# Patient Record
Sex: FEMALE | Race: WHITE | NOT HISPANIC OR LATINO | Employment: FULL TIME | URBAN - METROPOLITAN AREA
[De-identification: names, ages, dates, MRNs, and addresses within clinical notes are randomized per-mention and may not be internally consistent; named-entity substitution may affect disease eponyms.]

---

## 2017-01-05 ENCOUNTER — ALLSCRIPTS OFFICE VISIT (OUTPATIENT)
Dept: OTHER | Facility: OTHER | Age: 27
End: 2017-01-05

## 2017-03-02 ENCOUNTER — ALLSCRIPTS OFFICE VISIT (OUTPATIENT)
Dept: OTHER | Facility: OTHER | Age: 27
End: 2017-03-02

## 2017-05-24 ENCOUNTER — ALLSCRIPTS OFFICE VISIT (OUTPATIENT)
Dept: OTHER | Facility: OTHER | Age: 27
End: 2017-05-24

## 2017-05-24 LAB — S PYO AG THROAT QL: NEGATIVE

## 2017-09-26 ENCOUNTER — ALLSCRIPTS OFFICE VISIT (OUTPATIENT)
Dept: OTHER | Facility: OTHER | Age: 27
End: 2017-09-26

## 2017-09-26 LAB — S PYO AG THROAT QL: NEGATIVE

## 2017-10-23 DIAGNOSIS — E66.01 MORBID (SEVERE) OBESITY DUE TO EXCESS CALORIES (HCC): ICD-10-CM

## 2017-10-23 DIAGNOSIS — H66.90 OTITIS MEDIA: ICD-10-CM

## 2017-10-23 DIAGNOSIS — J30.9 ALLERGIC RHINITIS: ICD-10-CM

## 2017-10-23 DIAGNOSIS — Z13.220 ENCOUNTER FOR SCREENING FOR LIPOID DISORDERS: ICD-10-CM

## 2017-10-23 DIAGNOSIS — H69.80 OTHER SPECIFIED DISORDERS OF EUSTACHIAN TUBE, UNSPECIFIED EAR: ICD-10-CM

## 2017-10-23 DIAGNOSIS — R63.5 ABNORMAL WEIGHT GAIN: ICD-10-CM

## 2017-10-23 DIAGNOSIS — Z00.00 ENCOUNTER FOR GENERAL ADULT MEDICAL EXAMINATION WITHOUT ABNORMAL FINDINGS: ICD-10-CM

## 2017-10-23 DIAGNOSIS — J02.9 ACUTE PHARYNGITIS: ICD-10-CM

## 2017-10-24 ENCOUNTER — LAB CONVERSION - ENCOUNTER (OUTPATIENT)
Dept: OTHER | Facility: OTHER | Age: 27
End: 2017-10-24

## 2017-10-24 LAB
A/G RATIO (HISTORICAL): 1.7 (CALC) (ref 1–2.5)
ALBUMIN SERPL BCP-MCNC: 4.6 G/DL (ref 3.6–5.1)
ALP SERPL-CCNC: 80 U/L (ref 33–115)
ALT SERPL W P-5'-P-CCNC: 10 U/L (ref 6–29)
AST SERPL W P-5'-P-CCNC: 14 U/L (ref 10–30)
BILIRUB SERPL-MCNC: 0.4 MG/DL (ref 0.2–1.2)
BILIRUB UR QL STRIP: NEGATIVE
BUN SERPL-MCNC: 17 MG/DL (ref 7–25)
BUN/CREA RATIO (HISTORICAL): NORMAL (CALC) (ref 6–22)
CALCIUM SERPL-MCNC: 9.7 MG/DL (ref 8.6–10.2)
CHLORIDE SERPL-SCNC: 106 MMOL/L (ref 98–110)
CHOLEST SERPL-MCNC: 176 MG/DL
CHOLEST/HDLC SERPL: 2.9 (CALC)
CO2 SERPL-SCNC: 26 MMOL/L (ref 20–31)
COLOR UR: YELLOW
COMMENT (HISTORICAL): CLEAR
CORTIS AM PEAK SERPL-SCNC: 21.4 MCG/DL
CREAT SERPL-MCNC: 0.77 MG/DL (ref 0.5–1.1)
DEPRECATED RDW RBC AUTO: 12.9 % (ref 11–15)
EGFR AFRICAN AMERICAN (HISTORICAL): 122 ML/MIN/1.73M2
EGFR-AMERICAN CALC (HISTORICAL): 105 ML/MIN/1.73M2
FECAL OCCULT BLOOD DIAGNOSTIC (HISTORICAL): NEGATIVE
GAMMA GLOBULIN (HISTORICAL): 2.7 G/DL (CALC) (ref 1.9–3.7)
GLUCOSE (HISTORICAL): 93 MG/DL (ref 65–99)
GLUCOSE (HISTORICAL): NEGATIVE
HCT VFR BLD AUTO: 41.8 % (ref 35–45)
HDLC SERPL-MCNC: 60 MG/DL
HGB BLD-MCNC: 13.3 G/DL (ref 11.7–15.5)
KETONES UR STRIP-MCNC: NEGATIVE MG/DL
LDL CHOLESTEROL (HISTORICAL): 99 MG/DL (CALC)
LEUKOCYTE ESTERASE UR QL STRIP: NEGATIVE
MCH RBC QN AUTO: 27.4 PG (ref 27–33)
MCHC RBC AUTO-ENTMCNC: 31.8 G/DL (ref 32–36)
MCV RBC AUTO: 86.2 FL (ref 80–100)
NITRITE UR QL STRIP: NEGATIVE
NON-HDL-CHOL (CHOL-HDL) (HISTORICAL): 116 MG/DL (CALC)
PH UR STRIP.AUTO: 6 [PH] (ref 5–8)
PLATELET # BLD AUTO: 296 THOUSAND/UL (ref 140–400)
PMV BLD AUTO: 10.9 FL (ref 7.5–12.5)
POTASSIUM SERPL-SCNC: 5.2 MMOL/L (ref 3.5–5.3)
PROT UR STRIP-MCNC: NEGATIVE MG/DL
RBC # BLD AUTO: 4.85 MILLION/UL (ref 3.8–5.1)
SODIUM SERPL-SCNC: 140 MMOL/L (ref 135–146)
SP GR UR STRIP.AUTO: 1.02 (ref 1–1.03)
TOTAL PROTEIN (HISTORICAL): 7.3 G/DL (ref 6.1–8.1)
TRIGL SERPL-MCNC: 77 MG/DL
TSH SERPL DL<=0.05 MIU/L-ACNC: 4.31 MIU/L
WBC # BLD AUTO: 6.9 THOUSAND/UL (ref 3.8–10.8)

## 2017-10-27 NOTE — PROGRESS NOTES
Assessment  1  Otitis media (382 9) (H66 90)   2  ETD (eustachian tube dysfunction) (381 81) (H69 80)   3  Allergic rhinitis (477 9) (J30 9)   4  Severe obesity (BMI 35 0-39 9) (278 01) (E66 01)   5  Sore throat (462) (J02 9)   6  Encounter for preventive health examination (V70 0) (Z00 00)   7  Weight gain (783 1) (R63 5)   8  Screening for lipid disorders (V77 91) (Z13 220)    Plan  Allergic rhinitis, ETD (eustachian tube dysfunction)    · Azelastine HCl - 0 1 % Nasal Solution; USE 2 SPRAYS IN EACH       NOSTRIL  TWICE DAILY  Allergic rhinitis, Health Maintenance, ETD (eustachian tube dysfunction), Otitis media,  Screening for lipid disorders, Severe obesity (BMI 35 0-39 9), Sore throat    · Begin or continue regular aerobic exercise  Gradually work up to at least {count1}  sessions of {dur1} of exercise a week ; Status:Complete;   Done: 33GJZ9397 10:08AM   · Continue with our present treatment plan ; Status:Complete;   Done: 28COJ0152  10:08AM   · Drink plenty of fluids ; Status:Complete;   Done: 75LDD5240 10:08AM   · Gargle with warm salt water for 5 minutes every 4 hours ; Status:Complete;   Done:  86KLV6629 10:08AM   · We recommend that you bring your body mass index down to 26 ; Status:Complete;    Done: 25DFI1651 10:08AM  Allergic rhinitis, Health Maintenance, ETD (eustachian tube dysfunction), Otitis media,  Screening for lipid disorders, Severe obesity (BMI 35 0-39 9), Sore throat, Weight  gain    · Follow-up visit in 1 month Evaluation and Treatment  Follow-up  Status: Hold For -  Scheduling  Requested for: 26Sep2017   · (1) CBC/ PLT (NO DIFF); Status:Active; Requested ICW:60SZI7315;    · (1) COMPREHENSIVE METABOLIC PANEL; Status:Active; Requested KTR:56XUD5416;    · (1) CORTISOL AM SPECIMEN; Status:Active; Requested MPV:22GIY1820;    · (1) LIPID PANEL, FASTING; Status:Active; Requested for:23Oct2017;    · (1) TSH WITH FT4 REFLEX; Status:Active;  Requested NJ:07FYE1021;    · (1) URINALYSIS (will reflex a microscopy if leukocytes, occult blood, protein or nitrites  are not within normal limits); Status:Active; Requested UJY:10AXY2735;   Otitis media    · DrRx Zithromax Z-Davi 250 MG #6 pill pack; Sig   2 stat then one daily    Discussion/Summary    1  Sore throat, rapid strep was negative, patient to gargle with salt water use over-the-counter lozengesOtitis media, both serous infectious, Z-Davi was prescribedAllergic rhinitis 4  Etd, Astelin nasal spray prescribedSevere obesity 6  Waking, blood work is orderedHealth maintenance will check lipid panel with abovePatient return in 1 month, if symptomatology is not better next week return to office  Possible side effects of new medications were reviewed with the patient/guardian today  The treatment plan was reviewed with the patient/guardian  The patient/guardian understands and agrees with the treatment plan     Self Referrals: No      Chief Complaint  sore throat  chills and fever no cough rapid strep done today      History of Present Illness  HPI: The past 2 days patient has had fever and chills at home  Patient with head D just mild otalgia sneezing PRA post a drip and sore throat  No medications have been taken  Patient tells me she is trying to lose weight for the past year but ended up gaining 4 lb since last office visit  Review of Systems    Constitutional: as noted in HPI    ENT: as noted in HPI  Cardiovascular: no complaints of slow or fast heart rate, no chest pain, no palpitations, no leg claudication or lower extremity edema  Respiratory: no complaints of shortness of breath, no wheezing, no dyspnea on exertion, no orthopnea or PND  Breasts: no complaints of breast pain, breast lump or nipple discharge  Gastrointestinal: no complaints of abdominal pain, no constipation, no nausea or diarrhea, no vomiting, no bloody stools     Genitourinary: no complaints of dysuria, no incontinence, no pelvic pain, no dysmenorrhea, no vaginal discharge or abnormal vaginal bleeding  Musculoskeletal: no complaints of arthralgia, no myalgia, no joint swelling or stiffness, no limb pain or swelling  Integumentary: no complaints of skin rash or lesion, no itching or dry skin, no skin wounds  Neurological: no complaints of headache, no confusion, no numbness or tingling, no dizziness or fainting  Active Problems  1  Allergic rhinitis (477 9) (J30 9)   2  Encounter for insertion of mirena IUD (V25 11) (Z30 430)   3  Encounter for routine gynecological examination (V72 31) (Z01 419)   4  Migraine (346 90) (G43 909)   5  Presence of intrauterine contraceptive device (IUD) (V45 51) (Z97 5)   6  Sore throat (462) (J02 9)    Past Medical History  1  History of Abdominal pain, RUQ (789 01) (R10 11)   2  History of Acute pelvic pain, female (625 9) (R10 2)   3  History of Blood pressure elevated (401 9) (I10)   4  History of Cervical cancer screening (V76 2) (Z12 4)   5  History of Contraceptives (V25 02)   6  History of Dysfunction of Eustachian tube, unspecified laterality (381 81) (H69 80)   7  History of Encounter for IUD insertion (V25 11) (Z30 430)   8  History of  1 (V22 0) (Z34 00)   9  History of Gynecologic Services Intrauterine Device (IUD) Reinsertion (V25 13)   10  History of acute sinusitis (V12 69) (Z87 09)   11  History of pregnancy (V13 29)   12  Denied: History of self breast exam   13  History of urinary tract infection (V13 02) (Z87 440)   14  History of varicella (V12 09) (Z86 19)   15  History of Mother currently breast-feeding   12  History of Other chronic pain (338 29) (G89 29)   17  History of Symptomatic cholelithiasis (574 20) (K80 20)  Active Problems And Past Medical History Reviewed: The active problems and past medical history were reviewed and updated today  Family History  Mother    1  Family history of Breast Cancer (V16 3)  Father    2  Family history of Heart attack   3   Family history of Heart disease  Paternal Grandfather    4  Family history of Heart attack   5  Family history of Heart disease  Maternal Aunt    6  Family history of Breast Cancer (V16 3)   7  Family history of Breast Cancer (V16 3)  Family History Reviewed: The family history was reviewed and updated today  Social History   · Being A Social Drinker   · Denied: Caffeine Use   · Denied: History of Drug Use   ·    · Never A Smoker   · No secondhand smoke exposure (V49 89) (Z78 9)   · One child   · Sikhism Affiliation None   · Uses Safety Equipment - Seatbelts  The social history was reviewed and updated today  Surgical History  1  History of  Section Low Transverse   2  History of Cholecystectomy Laparoscopic   3  History of Colonoscopy   4  History of Ear Pressure Equalization Tube   5  History of Gallbladder Surgery   6  History of Gynecologic Services Intrauterine Device (IUD) Insertion   7  History of Oral Surgery Tooth Extraction [place] Tooth  Surgical History Reviewed: The surgical history was reviewed and updated today  Current Meds   1  Prenatal Vitamins CAPS Recorded    The medication list was reviewed and updated today  Allergies  1  Ceftin TABS   2  Maxalt TABS  3  No Known Environmental Allergies   4  No Known Food Allergies    Vitals   Recorded: 60WOO5224 09:49AM   Temperature 99 8 F   Heart Rate 64   Respiration 16   Systolic 957   Diastolic 72   Height 5 ft 5 5 in   Weight 215 lb 8 oz   BMI Calculated 35 32   BSA Calculated 2 05     Physical Exam    Constitutional   General appearance: No acute distress, well appearing and well nourished  Eyes   Conjunctiva and lids: No swelling, erythema or discharge  Ears, Nose, Mouth, and Throat   External inspection of ears and nose: Normal     Otoscopic examination: Abnormal  -- Both tympanic membranes dull with fluid left TM is injected     Nasal mucosa, septum, and turbinates: Abnormal  -- Positive allergic turbinates negative sinus tenderness to percussion  Oropharynx: Abnormal  -- Off white postnasal drip mild injection negative exudate  Pulmonary   Respiratory effort: No increased work of breathing or signs of respiratory distress  Auscultation of lungs: Clear to auscultation  Cardiovascular   Palpation of heart: Normal PMI, no thrills  Auscultation of heart: Normal rate and rhythm, normal S1 and S2, without murmurs  Carotid pulses: Normal     Lymphatic   Palpation of lymph nodes in neck: Abnormal  -- Shotty anterior nodes  Musculoskeletal   Gait and station: Normal     Skin Warm and dry  Neurologic Negative gross focal motor deficit     Psychiatric   Mood and affect: Normal          Signatures   Electronically signed by : Johnathon Dominique DO; Sep 26 2017 10:10AM EST                       (Author)

## 2017-11-07 ENCOUNTER — ALLSCRIPTS OFFICE VISIT (OUTPATIENT)
Dept: OTHER | Facility: OTHER | Age: 27
End: 2017-11-07

## 2017-11-08 NOTE — PROGRESS NOTES
Assessment  1  Allergic rhinitis (477 9) (J30 9)   2  Migraine headache (346 90) (G43 909)   · MRI Brain wnl 8/12   3  ETD (eustachian tube dysfunction) (381 81) (H69 80)   4  Severe obesity (BMI 35 0-39 9) (278 01) (E66 01)   5  Weight gain (783 1) (R63 5)   6  Screening for lipid disorders (V77 91) (Z13 220)   7  Encounter for preventive health examination (V70 0) (Z00 00)    Plan  Allergic rhinitis, Health Maintenance, ETD (eustachian tube dysfunction), Migraine  headache, Screening for lipid disorders, Severe obesity (BMI 35 0-39 9), Weight gain    · Begin or continue regular aerobic exercise  Gradually work up to at least {count1}  sessions of {dur1} of exercise a week ; Status:Complete;   Done: 17KPQ9418 08:19AM   · Continue with our present treatment plan ; Status:Complete;   Done: 06JPK1814  08:19AM   · We recommend that you bring your body mass index down to 26 ; Status:Complete;    Done: 09OBC1804 08:19AM   · Follow-up visit in 1 year Evaluation and Treatment  Follow-up  Status: Hold For -  Scheduling  Requested for: 88LNJ6392   · (1) CBC/ PLT (NO DIFF); Status:Active; Requested for:07Nov2018;    · (1) COMPREHENSIVE METABOLIC PANEL; Status:Active; Requested for:07Nov2018;    · (1) LIPID PANEL, FASTING; Status:Active; Requested for:07Nov2018;    · (1) TSH WITH FT4 REFLEX; Status:Active; Requested for:07Nov2018;    · (1) URINALYSIS (will reflex a microscopy if leukocytes, occult blood, protein or nitrites are  not within normal limits); Status:Active; Requested for:07Nov2018;   PMH: History of otitis media    · DrRx Zithromax Z-Davi 250 MG #6 pill pack    Discussion/Summary    1  AR/ETD, stable continue Astelin nasal sprayMigraine headache, asymptomatic at the present timeObesity, severe, blood work is normalWaking, patient did lose 5 lb since last office visit, diet exercise and weight loss discussedHealth maintenance, repeat routine blood work 1 yearReturn to office in 1 year, sooner if needed     The treatment plan was reviewed with the patient/guardian  The patient/guardian understands and agrees with the treatment plan     Self Referrals: No      Chief Complaint  1 month follow up, obesity  review lab results      History of Present Illness  Patient doing well ENT symptomatology has completely resolved  Blood work from last month was discussed with the patient  Patient lose 5 lb since last office visit      Review of Systems    Constitutional: as noted in HPI  Eyes: as noted in HPI    ENT: no complaints of earache, no loss of hearing, no nose bleeds, no nasal discharge, no sore throat, no hoarseness  Cardiovascular: No complaints of slow heart rate, no fast heart rate, no chest pain, no palpitations, no leg claudication, no lower extremity edema  Respiratory: No complaints of shortness of breath, no wheezing, no cough, no SOB on exertion, no orthopnea, no PND  Gastrointestinal: No complaints of abdominal pain, no constipation, no nausea or vomiting, no diarrhea, no bloody stools  Genitourinary: No complaints of dysuria, no incontinence, no pelvic pain, no dysmenorrhea, no vaginal discharge or bleeding  Musculoskeletal: No complaints of arthralgias, no myalgias, no joint swelling or stiffness, no limb pain or swelling  Integumentary: No complaints of skin rash or lesions, no itching, no skin wounds, no breast pain or lump  Neurological: No complaints of headache, no confusion, no convulsions, no numbness, no dizziness or fainting, no tingling, no limb weakness, no difficulty walking  Psychiatric: Not suicidal, no sleep disturbance, no anxiety or depression, no change in personality, no emotional problems  Endocrine: No complaints of proptosis, no hot flashes, no muscle weakness, no deepening of the voice, no feelings of weakness  Hematologic/Lymphatic: No complaints of swollen glands, no swollen glands in the neck, does not bleed easily, does not bruise easily  Active Problems  1  Allergic rhinitis (477 9) (J30 9)   2  Encounter for insertion of mirena IUD (V25 11) (Z30 430)   3  Encounter for routine gynecological examination (V72 31) (Z01 419)   4  ETD (eustachian tube dysfunction) (381 81) (H69 80)   5  Migraine headache (346 90) (G43 909)   6  Presence of intrauterine contraceptive device (IUD) (V45 51) (Z97 5)   7  Screening for lipid disorders (V77 91) (Z13 220)   8  Severe obesity (BMI 35 0-39 9) (278 01) (E66 01)   9  Weight gain (783 1) (R63 5)    Past Medical History  1  History of Abdominal pain, RUQ (789 01) (R10 11)   2  History of Acute pelvic pain, female (625 9) (R10 2)   3  History of Blood pressure elevated (401 9) (I10)   4  History of Cervical cancer screening (V76 2) (Z12 4)   5  History of Contraceptives (V25 02)   6  History of Dysfunction of Eustachian tube, unspecified laterality (381 81) (H69 80)   7  History of Encounter for IUD insertion (V25 11) (Z30 430)   8  History of  1 (V22 0) (Z34 00)   9  History of Gynecologic Services Intrauterine Device (IUD) Reinsertion (V25 13)   10  History of acute sinusitis (V12 69) (Z87 09)   11  History of pregnancy (V13 29)   12  Denied: History of self breast exam   13  History of urinary tract infection (V13 02) (Z87 440)   14  History of varicella (V12 09) (Z86 19)   15  History of Mother currently breast-feeding   12  History of Other chronic pain (338 29) (G89 29)   17  History of Symptomatic cholelithiasis (574 20) (K80 20)    Surgical History  1  History of  Section Low Transverse   2  History of Cholecystectomy Laparoscopic   3  History of Colonoscopy   4  History of Ear Pressure Equalization Tube   5  History of Gallbladder Surgery   6  History of Gynecologic Services Intrauterine Device (IUD) Insertion   7  History of Oral Surgery Tooth Extraction [place] Tooth    The surgical history was reviewed and updated today  Family History  Mother    1  Family history of Breast Cancer (V16 3)  Father    2  Family history of Heart attack   3  Family history of Heart disease  Paternal Grandfather    4  Family history of Heart attack   5  Family history of Heart disease  Maternal Aunt    6  Family history of Breast Cancer (V16 3)   7  Family history of Breast Cancer (V16 3)    The family history was reviewed and updated today  Social History   · Being A Social Drinker   · Denied: Caffeine Use   · Denied: History of Drug Use   ·    · Never A Smoker   · No secondhand smoke exposure (V49 89) (Z78 9)   · One child   · Religion Affiliation None   · Uses Safety Equipment - Seatbelts  The social history was reviewed and updated today  Current Meds   1  Azelastine HCl - 0 1 % Nasal Solution; USE 2 SPRAYS IN EACH       NOSTRIL TWICE   DAILY; Therapy: 85BRL8589 to (Last Nuria Osei)  Requested for: 39SKZ7166 Ordered   2  DrRx Zithromax Z-Davi 250 MG #6 pill pack; Sig   2 stat then one daily; Therapy: 81SPS3893 to (Evaluate:01Oct2017); Last Rx:56Bjv9305 Ordered   3  Prenatal Vitamins CAPS Recorded    The medication list was reviewed and updated today  Allergies  1  Ceftin TABS   2  Maxalt TABS  3  No Known Environmental Allergies   4  No Known Food Allergies    Vitals  Vital Signs    Recorded: 44YFO9377 08:05AM   Heart Rate 70   Respiration 16   Systolic 513   Diastolic 80   Height 5 ft 5 5 in   Weight 211 lb 6 oz   BMI Calculated 34 64   BSA Calculated 2 04     Physical Exam    Constitutional   General appearance: No acute distress, well appearing and well nourished  Eyes   Conjunctiva and lids: No swelling, erythema or discharge  Ears, Nose, Mouth, and Throat   External inspection of ears and nose: Normal     Otoscopic examination: Tympanic membranes translucent with normal light reflex  Canals patent without erythema  Nasal mucosa, septum, and turbinates: Abnormal  -- Mildly allergic turbinates  Oropharynx: Normal with no erythema, edema, exudate or lesions      Pulmonary   Respiratory effort: No increased work of breathing or signs of respiratory distress  Auscultation of lungs: Clear to auscultation  Cardiovascular   Palpation of heart: Normal PMI, no thrills  Auscultation of heart: Normal rate and rhythm, normal S1 and S2, without murmurs  Examination of extremities for edema and/or varicosities: Normal     Carotid pulses: Normal     Abdomen Soft nontender  Lymphatic   Palpation of lymph nodes in neck: No lymphadenopathy  Musculoskeletal   Gait and station: Normal     Skin Warm and dry  Neurologic Negative gross focal motor deficit  Psychiatric   Mood and affect: Normal          Results/Data  Tobacco Screening 79WGQ6133 08:09AM User, Ahs     Test Name Result Flag Reference   Tobacco Screening - Result Negative       PHQ-2 Adult Depression Screening 74VLG8980 08:08AM User, Ahs     Test Name Result Flag Reference   PHQ-2 Adult Depression Score 0     Over the last two weeks, how often have you been bothered by any of the following problems?   Little interest or pleasure in doing things: Not at all - 0  Feeling down, depressed, or hopeless: Not at all - 0   PHQ-2 Adult Depression Screening Negative         Signatures   Electronically signed by : Moses Mcclain DO; Nov 7 2017  8:20AM EST                       (Author)

## 2018-01-09 NOTE — MISCELLANEOUS
Message  Message Free Text Note Form: Patient called, notes continued left breast tenderness and swelling  She started antibiotics 3 days previously and is continuing to attempt to pump the breast  She notes minimal discharge from the breast at this time  Of note, her fever is improved and her muscle aches/sweats have resolved  Likely, the antibiotic is helping but she was encouraged to continue with it  Should she not notice significant improvement by tomorrow, 9/12 she was encouraged to call in for further evaluation  I spoke with Nara Moore, nurse at [facility] on 9/12 and we will contact the patient today  Should she continue with significant symptoms, would recommend left breast ultrasound today with follow-up after that for evaluation to rule out breast abscess        Signatures   Electronically signed by : NEDA Baird ; Sep 12 2016  9:43AM EST                       (Author)

## 2018-01-10 NOTE — MISCELLANEOUS
Message  Return to work or school:   Jenny Fiore is under my professional care  She was seen in my office on 09/26/2017   She is able to return to work on  09/27/2017      Please excuse absence, if you have any questions please feel free to call the office  Yumiko Ford,         Signatures   Electronically signed by : Ryley Browne, ; Sep 26 2017 10:17AM EST                       (Author)

## 2018-01-11 NOTE — PROGRESS NOTES
Active Problems    1  Currently pregnant (V22 2) (Z33 1)    Current Meds    1  Multi-Vitamin Daily Oral Tablet; Therapy: (Recorded:47Kkc0995) to Recorded    Allergies    1  Ceftin TABS   2  Maxalt TABS    3  No Known Environmental Allergies   4  No Known Food Allergies    Results/Data  [id number] Transvaginal Ultrasound OB [facility]:   Procedure: 78960-[id number] pregnant uterus real time with image documentation, fetal and maternal evaluation, first trimester (<14 weeks 0 days), transvaginal approach: single or first gestation  The study was done today in the office  Indication: EDC gestational age 11w0d with an [facility] of 9/1/2016 weeks  Exam indication: New OB  Findings:   Number of gestational sacs and fetuses: One gestational sac containing one embryo  Gestational sac/fetal measurements appropriate for gestation: CRL = 18 6mm  yolk sac = 3 8mm  FHR = 168bpm    Survey of visible fetal and placental anatomic structure within normal limits  Qualitative assessment of amniotic fluid volume/gestational sac shape: There is a a 1 7cm subchorionic bleed present adjacent to the gestational sac  Exam of maternal uterus and adnexa: within normal limits  There is a 2 1cm corpus luteal cyst    Impression: Single, viable IUP slightly small for dates, however consistent with patient's longer cycles  8w3d with an EDC of 9/5/2016 by today's ultrasound        Signatures   Electronically signed by : Raheel Trimble, ; Jan 28 2016 10:50AM EST                       (Author)    Electronically signed by : NEDA Florian ; Jan 28 2016  4:24PM EST

## 2018-01-11 NOTE — MISCELLANEOUS
Message   Date: 12 Sep 2016 9:43 AM EST, Recorded By: Uli Dave For: Mukul Mott   Reason: Medical Complaint   Called patient per Dr Luz Alatorre to check  Pt c/o still left breast pain, redness, hardness  Taking Keflex as directed  Advised needs left breast ultrasound scheduled for 11:30 today @ [address]  Also advised schedule appt for breast exam with Dr Luz Alatorre  Advised increase fluids, tylenol PRN  Active Problems    1  Acute mastitis of left breast (611 0) (N61)   2  Birth control counseling (V25 09) (Z30 9)   3  Currently pregnant (V22 2) (Z33 1)   4  Migraine (346 90) (G43 909)   5  Need for diphtheria-tetanus-pertussis (Tdap) vaccine (V06 1) (Z23)   6  Overweight (278 02) (E66 3)    Current Meds   1  Acetaminophen-Codeine #3 300-30 MG Oral Tablet; Take one tablet 3 times daily as   needed for headache; Therapy: 40DJP6939 to (Last Rx:35Epu3657) Ordered   2  Cephalexin 500 MG Oral Tablet (Cephalexin Monohydrate); TAKE 1 TABLET Every 6   hours; Therapy: 38OAI0024 to (Evaluate:71Vdo3967)  Requested for: 35Xne2214; Last   Rx:30Hvg3176 Ordered   3  Prenatal Vitamins CAPS Recorded    Allergies    1  Ceftin TABS   2  Maxalt TABS    3  No Known Environmental Allergies   4  No Known Food Allergies    Plan  Acute mastitis of left breast    · *US BREAST LEFT LIMITED (DIAGNOSTIC); Status:Hold For - [employer];  Requested MMR:86GUT5798;     Signatures   Electronically signed by : Cathie Joseph, ; Sep 12 2016  9:46AM EST                       (Author)

## 2018-01-11 NOTE — MISCELLANEOUS
Message   Recorded as Task   Date: 09/19/2016 02:08 PM, Created By: Eugene Moreno   Task Name: Miscellaneous   Assigned To: Mar Preston   Regarding Patient: Adrián Collins, Status: In Progress   Comment:    Ivan Bell - 19 Sep 2016 2:08 PM     TASK CREATED  The culture from her breast showed staph aureus, sensitive to Keflex and Augmentin with which she was treated  Please inform the patient that the antibiotics should work for this bacteria  Please call her to see how she is doing  Additionally, this was not truly a wound culture but a culture of her nipple discharge  This should be amended through the lab  Thanks   Elis Liao - 19 Sep 2016 3:58 PM     TASK IN PROGRESS        Active Problems    1  Acute mastitis of left breast (611 0) (N61)   2  Birth control counseling (V25 09) (Z30 9)   3  Migraine (346 90) (G43 909)   4  Need for diphtheria-tetanus-pertussis (Tdap) vaccine (V06 1) (Z23)   5  Overweight (278 02) (E66 3)    Current Meds   1  Acetaminophen-Codeine #3 300-30 MG Oral Tablet; Take one tablet 3 times daily as   needed for headache; Therapy: 10YQB8872 to (Last Rx:32Nab3014) Ordered   2  Cephalexin 500 MG Oral Tablet (Cephalexin Monohydrate); TAKE 1 TABLET Every 6   hours; Therapy: 84MCT5143 to (Evaluate:22Xvd6983)  Requested for: 54Wet4275; Last   Rx:06Wos5584 Ordered   3  Clindamycin HCl - 300 MG Oral Capsule; TAKE 1 CAPSULE 3 times daily; Therapy: 37Cjs5592 to (Evaluate:39Mdh8970)  Requested for: 36Idk8405; Last   Rx:40Uvz7196 Ordered   4  Prenatal Vitamins CAPS Recorded    Allergies    1  Ceftin TABS   2  Maxalt TABS    3  No Known Environmental Allergies   4   No Known Food Allergies    Signatures   Electronically signed by : Terri Bolden, ; Sep 19 2016  4:37PM EST                       (Author)

## 2018-01-11 NOTE — RESULT NOTES
Verified Results  (1) WOUND CULTURE [id number] 02:08PM Tom Bennett     Test Name Result Flag Reference   CLINICAL REPORT (Report)     Test:        Wound culture  Specimen Type:    Wound  Specimen Date:   9/13/2016 2:08 PM  Result Date:    9/16/2016 10:32 AM  Result Status:   Final result  Resulting Lab:   [facility] [address] [address]            Tel: [phone]      CULTURE                                       ------------------                                   2+ Growth of Staphylococcus aureus    2+ Growth of Mixed Skin [place]      STAIN                                        ------------------                                   No polys seen    1+ Gram positive cocci in pairs      SUSCEPTIBILITY                                   ------------------                                                       Staphylococcus aureus  METHOD                 REMY  -------------------------------------  ----------------------------  AMOXICILLIN + CLAVULANATE        <=4/2 ug/ml   Susceptible  AMPICILLIN ($$)             >8 00 ug/ml   Resistant  AMPICILLIN + SULBACTAM ($)       <=8/4 ug/ml   Susceptible  CEFAZOLIN ($)              <=8 00 ug/ml   Susceptible  CLINDAMYCIN ($)             <=0 50 ug/ml   Susceptible  ERYTHROMYCIN ($$$$)           <=0 50 ug/ml   Susceptible  GENTAMICIN ($$)             <=4 ug/ml    Susceptible  OXACILLIN                <=0 25 ug/ml   Susceptible  TETRACYCLINE              <=4 ug/ml    Susceptible  TRIMETHOPRIM + SULFAMETHOXAZOLE ($$$)  <=0 5/9 5 ug/ml Susceptible  VANCOMYCIN ($)             2 00 ug/ml    Susceptible

## 2018-01-12 VITALS
DIASTOLIC BLOOD PRESSURE: 72 MMHG | TEMPERATURE: 98.3 F | HEART RATE: 88 BPM | WEIGHT: 204 LBS | SYSTOLIC BLOOD PRESSURE: 110 MMHG | HEIGHT: 66 IN | BODY MASS INDEX: 32.78 KG/M2

## 2018-01-12 VITALS
WEIGHT: 211.38 LBS | HEART RATE: 70 BPM | HEIGHT: 66 IN | RESPIRATION RATE: 16 BRPM | SYSTOLIC BLOOD PRESSURE: 102 MMHG | DIASTOLIC BLOOD PRESSURE: 80 MMHG | BODY MASS INDEX: 33.97 KG/M2

## 2018-01-12 NOTE — MISCELLANEOUS
Message   Recorded as Task   Date: 11/08/2016 11:33 AM, Created By: Amena Hillman   Task Name: Call Back   Assigned To: [facility],Team   Regarding Patient: Roscoe Olivo, Status: Active   CommentChencho Ozuna - 08 Nov 2016 11:33 AM     TASK CREATED  Please call with results  Chronic inflammation  No cancer  Gabby June - 08 Nov 2016 12:09 PM     TASK EDITED  Left message for patient to call office for results  Gabby June - 14 Nov 2016 4:45 PM     TASK EDITED  Pt in office 11/14/16 and results were given  See office note        Active Problems    1  Acute mastitis of left breast (611 0) (N61 0)   2  Birth control counseling (V25 09) (Z30 9)   3  Contraception (V25 9) (Z30 9)   4  Elevated liver function tests (790 6) (R79 89)   5  Encounter for insertion of mirena IUD (V25 11) (Z30 430)   6  Encounter for IUD insertion (V25 11) (Z30 430)   7  Flank pain (789 09) (R10 9)   8  Hematuria, microscopic (599 72) (R31 29)   9  Migraine (346 90) (G43 909)   10  Mother currently breast-feeding   6  Need for diphtheria-tetanus-pertussis (Tdap) vaccine (V06 1) (Z23)   12  Overweight (278 02) (E66 3)    Current Meds   1  Clindamycin HCl - 300 MG Oral Capsule; TAKE 1 CAPSULE 3 times daily; Therapy: 03Dto4189 to (Ada Aj)  Requested for: 22FGF5230; Last   Rx:14Nov2016 Ordered   2  Colace 100 MG Oral Capsule; TAKE 1 CAPSULE TWICE DAILY; Therapy: 55ULV7230 to (Evaluate:05Nov2016); Last Rx:21Oct2016 Ordered   3  Keflex 500 MG Oral Capsule (Cephalexin); Therapy: (Benjamin Rich) to Recorded   4  Ondansetron 4 MG Oral Tablet Dispersible; take 1 tablet every 6 hours as needed for   nausea; Therapy: 21Oct2016 to (Last Rx:21Oct2016) Ordered   5  Prenatal Vitamins CAPS Recorded    Allergies    1  Ceftin TABS   2  Maxalt TABS    3  No Known Environmental Allergies   4   No Known Food Allergies    Signatures   Electronically signed by : Zackary Castillo, ; Nov 14 2016  4:45PM EST (Author)

## 2018-01-12 NOTE — PROGRESS NOTES
Assessment    1  Migraine (346 90) (G43 909)   · MRI Brain wnl 8/12   2  Currently pregnant (V22 2) (Z33 1)   3  Blood pressure elevated (796 2) (I10)   4  Allergic rhinitis (477 9) (J30 9)    Plan  Allergic rhinitis, Blood pressure elevated, Currently pregnant, Migraine    · Continue with our present treatment plan ; Status:Complete;   Done: 80QLG4312  03:47PM   · Follow-up PRN Evaluation and Treatment  Follow-up  Status: Complete  Done:  31HNP5539 03:47PM  Currently pregnant, Migraine    · *1 - [facility] Physician Referral  Consult  Status: Hold For - Scheduling   Requested for: 01GAV2615  Care Summary provided  : Yes  Migraine    · Acetaminophen-Codeine #3 300-30 MG Oral Tablet; Take one tablet 3 times daily  as needed for headache  Unlinked    · Multi-Vitamin Daily Oral Tablet    Discussion/Summary    #1  Migraine cephalgia, discussed medications in pregnancy at length the different categories of drugs  Will prescribe Tylenol No  3 with codeine  Drowsiness and GI side effects discussed  Discussed is a category C  drug risk and benefit I discussed at length of this medication with the patient    In light of patient being pregnant and migraine headaches  Will refer to neurology for further evaluation and treatment if needed  #2  Pregnancy patient is following up with OB/GYN  #3  Elevated blood pressure, improved during exam  Patient will have OB/GYN monitor this especially during pregnancy  #4  Allergic rhinitis, asymptomatic at the present time  Possible side effects of new medications were reviewed with the patient/guardian today  The treatment plan was reviewed with the patient/guardian  The patient/guardian understands and agrees with the treatment plan      Chief Complaint  pt is 9 weeks pregnant and states she's had migranes since elementary school  Pt usually takes Excedrin Migrane  Pt's migranes consist of loss of vision in her right eye, aura, vomiting x 1 month   Tylenol is not working   ak History of Present Illness  HPI: History as above  The headaches have not changed in any way he'll went from his patient used to use Excedrin Migraine with excellent relief unfortunately been pregnant she's not allow to use that patient states she gets 2-3 headaches a week      Review of Systems    Constitutional: No fever, no chills, feels well, no tiredness, no recent weight gain or loss  ENT: no ear ache, no loss of hearing, no nosebleeds or nasal discharge, no sore throat or hoarseness  Cardiovascular: no complaints of slow or fast heart rate, no chest pain, no palpitations, no leg claudication or lower extremity edema  Respiratory: no complaints of shortness of breath, no wheezing, no dyspnea on exertion, no orthopnea or PND  Breasts: no complaints of breast pain, breast lump or nipple discharge  Gastrointestinal: no complaints of abdominal pain, no constipation, no nausea or diarrhea, no vomiting, no bloody stools  Genitourinary: as noted in HPI  Musculoskeletal: no complaints of arthralgia, no myalgia, no joint swelling or stiffness, no limb pain or swelling  Integumentary: no complaints of skin rash or lesion, no itching or dry skin, no skin wounds  Neurological: as noted in HPI  Active Problems    1  Allergic rhinitis (477 9) (J30 9)   2  Currently pregnant (V22 2) (Z33 1)   3  Migraine (346 90) (G43 909)   4  Overweight (278 02) (E66 3)    Past Medical History    1  History of Acute pelvic pain, female (625 9) (N94 9)   2  History of Cervical cancer screening (V76 2) (Z12 4)   3  History of Contraceptives (V25 02)   4  History of Encounter for routine gynecological examination (V72 31) (Z01 419)   5  History of  0 (V61 8) (Z63 8)   6  History of Gynecologic Services Intrauterine Device (IUD) Reinsertion (V25 13)   7  History of varicella (V12 09) (Z86 19)   8  History of Irregular menses (626 4) (N92 6)   9  History of Other chronic pain (338 29) (G89 29)   10   History of Summary Of Previous Pregnancies  0  (Total No )    Family History    1  Family history of Breast Cancer (V16 3)    2  Family history of Heart attack   3  Family history of Heart disease    4  Family history of Heart attack   5  Family history of Heart disease    6  Family history of Breast Cancer (V16 3)   7  Family history of Breast Cancer (V16 3)  Family History Reviewed: The family history was reviewed and updated today  Social History    · Being A Social Drinker   · Denied: Caffeine Use   · Condom   · Denied: History of Drug Use   · Denied: History of    ·    · Never A Smoker   · Church Affiliation None   · Uses Safety Equipment - Seatbelts  The social history was reviewed and updated today  Surgical History    1  History of Ear Pressure Equalization Tube   2  History of Gynecologic Services Intrauterine Device (IUD) Insertion    Current Meds   1  Multi-Vitamin Daily Oral Tablet; Therapy: (Recorded:61Rjo7223) to Recorded   2  Prenatal Vitamins CAPS Recorded    Allergies    1  Ceftin TABS   2  Maxalt TABS    3  No Known Environmental Allergies   4  No Known Food Allergies    Vitals   Recorded: 46MVZ1111 03:40PM Recorded: 82XPP5581 03:24PM   Heart Rate  80   Systolic 878 397   Diastolic 70 70   Height  5 ft 6 in   Weight  200 lb    BMI Calculated  32 28   BSA Calculated  2 01     Physical Exam    Constitutional   General appearance: No acute distress, well appearing and well nourished  Eyes   Conjunctiva and lids: No swelling, erythema or discharge  Pupils and irises: Equal, round and reactive to light  Ears, Nose, Mouth, and Throat   External inspection of ears and nose: Normal     Otoscopic examination: Tympanic membranes translucent with normal light reflex  Canals patent without erythema  Nasal mucosa, septum, and turbinates: Abnormal   mildly allergic turbinates  Oropharynx: Normal with no erythema, edema, exudate or lesions      Pulmonary   Respiratory effort: No increased work of breathing or signs of respiratory distress  Auscultation of lungs: Clear to auscultation  Cardiovascular   Palpation of heart: Normal PMI, no thrills  Auscultation of heart: Normal rate and rhythm, normal S1 and S2, without murmurs  Examination of extremities for edema and/or varicosities: Normal     Carotid pulses: Normal     Lymphatic   Palpation of lymph nodes in neck: No lymphadenopathy  Musculoskeletal   Gait and station: Normal     Skin Warm and dry  Neurologic   Cranial nerves: Cranial nerves 2-12 intact  Psychiatric   Mood and affect: Normal          Future Appointments    Date/Time Provider Specialty Site   2016 05:45 PM NEDA Roldan   Obstetrics/Gynecology [facility]   2016 02:30 PM  [place], Schedule  [facility] OUTPATIENT     Signatures   Electronically signed by : Roberto Carlos Ferguson DO; 2016  3:49PM EST                       (Author)

## 2018-01-12 NOTE — MISCELLANEOUS
Message   Recorded as Task   Date: 11/14/2016 09:28 AM, Created By: Tiffany Mosher   Task Name: Care Coordination   Assigned To: Elis Liao   Regarding Patient: Shawn Martin, Status: Active   Comment:    Elis Liao - 14 Nov 2016 9:28 AM     TASK CREATED  pt has Mastitis in her other breast now    You saw her in October for this  Rosario Lerma has an appt next week for her IUD check    Can we give her the antibiotic? Solitario Thrasher - 14 Nov 2016 10:29 AM     TASK REPLIED TO: Previously Assigned To Solitario Thrasher  yes, repeat what was given to her previously        Active Problems    1  Acute mastitis of left breast (611 0) (N61 0)   2  Birth control counseling (V25 09) (Z30 9)   3  Contraception (V25 9) (Z30 9)   4  Elevated liver function tests (790 6) (R79 89)   5  Encounter for insertion of mirena IUD (V25 11) (Z30 430)   6  Encounter for IUD insertion (V25 11) (Z30 430)   7  Flank pain (789 09) (R10 9)   8  Hematuria, microscopic (599 72) (R31 29)   9  Migraine (346 90) (G43 909)   10  Mother currently breast-feeding   6  Need for diphtheria-tetanus-pertussis (Tdap) vaccine (V06 1) (Z23)   12  Overweight (278 02) (E66 3)    Current Meds   1  Colace 100 MG Oral Capsule; TAKE 1 CAPSULE TWICE DAILY; Therapy: 82ZIP8762 to (Evaluate:05Nov2016); Last Rx:21Oct2016 Ordered   2  Keflex 500 MG Oral Capsule (Cephalexin); Therapy: (Galdino Garcia) to Recorded   3  Ondansetron 4 MG Oral Tablet Dispersible; take 1 tablet every 6 hours as needed for   nausea; Therapy: 96RDZ1406 to (Last Rx:21Oct2016) Ordered   4  Prenatal Vitamins CAPS Recorded    Allergies    1  Ceftin TABS   2  Maxalt TABS    3  No Known Environmental Allergies   4   No Known Food Allergies    Plan  Acute mastitis of left breast    · Clindamycin HCl - 300 MG Oral Capsule; TAKE 1 CAPSULE 3 times daily    Signatures   Electronically signed by : Giovanna Perkins, ; Nov 14 2016 10:42AM EST                       (Author)

## 2018-01-13 VITALS
DIASTOLIC BLOOD PRESSURE: 80 MMHG | WEIGHT: 205 LBS | SYSTOLIC BLOOD PRESSURE: 114 MMHG | HEIGHT: 66 IN | BODY MASS INDEX: 32.95 KG/M2

## 2018-01-13 VITALS
WEIGHT: 215.5 LBS | RESPIRATION RATE: 16 BRPM | SYSTOLIC BLOOD PRESSURE: 120 MMHG | BODY MASS INDEX: 34.63 KG/M2 | HEIGHT: 66 IN | HEART RATE: 64 BPM | DIASTOLIC BLOOD PRESSURE: 72 MMHG | TEMPERATURE: 99.8 F

## 2018-01-13 VITALS
TEMPERATURE: 99 F | DIASTOLIC BLOOD PRESSURE: 72 MMHG | BODY MASS INDEX: 33.93 KG/M2 | WEIGHT: 211.13 LBS | HEIGHT: 66 IN | SYSTOLIC BLOOD PRESSURE: 114 MMHG

## 2018-01-13 NOTE — MISCELLANEOUS
Message   Recorded as Task   Date: 09/08/2016 10:35 AM, Created By: Walter Damon   Task Name: Miscellaneous   Assigned To: Claudine Nuñez   Regarding Patient: Adan Womack, Status: In Progress   CommentNoel Messing - 08 Sep 2016 10:35 AM     TASK CREATED  Vision was seen today for 2 week incision check and mastitis evaluation  She is interested in Mirena IUD  Please check for insurance company coverage  She will follow-up in 4 weeks' time for the 6 week postpartum check  If covered, we can place it shortly after that visit  [facility] [place] - 08 Sep 2016 4:02 PM     TASK EDITED                 Spoke to Deisy Ma at [facility], Mirena iud is covered at 100% under plan  NO ded or oop   [facility] [place] - 08 Sep 2016 4:24 PM     TASK EDITED                 lm for pt   [facility] - 08 Sep 2016 4:24 PM     TASK IN PROGRESS   [facility] [place] - 12 Sep 2016 9:45 AM     TASK EDITED                 Spoke to patient, she wishes to proceed with iud insertion  SHe will schedule with Dr Razia Matias after her pp check up        Active Problems    1  Acute mastitis of left breast (611 0) (N61)   2  Birth control counseling (V25 09) (Z30 9)   3  Currently pregnant (V22 2) (Z33 1)   4  Migraine (346 90) (G43 909)   5  Need for diphtheria-tetanus-pertussis (Tdap) vaccine (V06 1) (Z23)   6  Overweight (278 02) (E66 3)    Current Meds   1  Acetaminophen-Codeine #3 300-30 MG Oral Tablet; Take one tablet 3 times daily as   needed for headache; Therapy: 72BLH2862 to (Last Rx:78Ozw0574) Ordered   2  Cephalexin 500 MG Oral Tablet (Cephalexin Monohydrate); TAKE 1 TABLET Every 6   hours; Therapy: 50ZZH6985 to (Evaluate:15Ngq8124)  Requested for: 73Hqm6601; Last   Rx:35Wpx1215 Ordered   3  Prenatal Vitamins CAPS Recorded    Allergies    1  Ceftin TABS   2  Maxalt TABS    3  No Known Environmental Allergies   4  No Known Food Allergies    Signatures   Electronically signed by :  Jaquan Helm, ; Sep 12 2016  9:46AM EST (Author)

## 2018-01-14 NOTE — MISCELLANEOUS
Message  We had to rescheduled the patient's appointment on 8/16 due to no doctor here in the office  We offered her the 15th she refused to come in at that time  We then offered other times that week but patient would not take any of them  She just wants to keep the 23rd as her next appointment  I stressed the importance of weekly appts due to being at the end of her pregnancy  She is scheduled to come in 8/9 for her next appointment  AM      Active Problems    1  Currently pregnant (V22 2) (Z33 1)   2  Migraine (346 90) (G43 909)   3  Need for diphtheria-tetanus-pertussis (Tdap) vaccine (V06 1) (Z23)   4  Overweight (278 02) (E66 3)    Current Meds   1  Acetaminophen-Codeine #3 300-30 MG Oral Tablet; Take one tablet 3 times daily as   needed for headache; Therapy: 14MDU7667 to (Last Rx:23Nyc9124) Ordered   2  Prenatal Vitamins CAPS Recorded    Allergies    1  Ceftin TABS   2  Maxalt TABS    3  No Known Environmental Allergies   4   No Known Food Allergies    Signatures   Electronically signed by : Franchesca Braswell, ; Aug  5 2016  1:32PM EST                       (Author)

## 2018-01-15 NOTE — MISCELLANEOUS
Message   Recorded as Task   Date: 09/12/2016 09:48 AM, Created By: Ashlee Nicholson   Task Name: Follow Up   Assigned To: Vero Gatica   Regarding Patient: Ricky Elizabeth, Status: Active   CommentMarita Sermons - 12 Sep 2016 9:48 AM     TASK CREATED  Pt still has left breast pain  Will have breast ultrasound done today and has follow up appointment with you tomorrow  Ivan Bell - 12 Sep 2016 10:37 AM     TASK REPLIED TO: Previously Assigned To Mondokio, thanks        Active Problems    1  Acute mastitis of left breast (611 0) (N61)   2  Birth control counseling (V25 09) (Z30 9)   3  Currently pregnant (V22 2) (Z33 1)   4  Migraine (346 90) (G43 909)   5  Need for diphtheria-tetanus-pertussis (Tdap) vaccine (V06 1) (Z23)   6  Overweight (278 02) (E66 3)    Current Meds   1  Acetaminophen-Codeine #3 300-30 MG Oral Tablet; Take one tablet 3 times daily as   needed for headache; Therapy: 41AQB8006 to (Last Rx:64Qpd1371) Ordered   2  Cephalexin 500 MG Oral Tablet (Cephalexin Monohydrate); TAKE 1 TABLET Every 6   hours; Therapy: 07NXZ0499 to (Evaluate:52Wef6644)  Requested for: 93Aqo3720; Last   Rx:97Lge5994 Ordered   3  Prenatal Vitamins CAPS Recorded    Allergies    1  Ceftin TABS   2  Maxalt TABS    3  No Known Environmental Allergies   4   No Known Food Allergies    Signatures   Electronically signed by : Moses Joy, ; Sep 12 2016 12:32PM EST                       (Author)

## 2018-01-15 NOTE — MISCELLANEOUS
Message   Recorded as Task   Date: 09/12/2016 12:14 PM, Created By: Mae Lowry   Task Name: Go to Result   Assigned To: Mirella Presumfederico   Regarding Patient: Corina Eisenmenger, Status: Active   CommentArtie Corporal - 12 Sep 2016 12:14 PM     TASK CREATED  Fortunately, breast ultrasound shows no abscess  Would recommend exam as scheduled for tomorrow  If no significant improvement is noted, we may need to switch antibiotics at that time   Pt informed  Active Problems    1  Acute mastitis of left breast (611 0) (N61)   2  Birth control counseling (V25 09) (Z30 9)   3  Currently pregnant (V22 2) (Z33 1)   4  Migraine (346 90) (G43 909)   5  Need for diphtheria-tetanus-pertussis (Tdap) vaccine (V06 1) (Z23)   6  Overweight (278 02) (E66 3)    Current Meds   1  Acetaminophen-Codeine #3 300-30 MG Oral Tablet; Take one tablet 3 times daily as   needed for headache; Therapy: 41MTC9197 to (Last Rx:14Oqd5373) Ordered   2  Cephalexin 500 MG Oral Tablet (Cephalexin Monohydrate); TAKE 1 TABLET Every 6   hours; Therapy: 53OWN7382 to (Evaluate:64Ifl7587)  Requested for: 25Qxp4460; Last   Rx:70Dyp2513 Ordered   3  Prenatal Vitamins CAPS Recorded    Allergies    1  Ceftin TABS   2  Maxalt TABS    3  No Known Environmental Allergies   4   No Known Food Allergies    Signatures   Electronically signed by : Chema Moreno, ; Sep 12 2016 12:31PM EST                       (Author)

## 2018-01-15 NOTE — PROGRESS NOTES
2016         RE: Eduardo Baumann                              To: [facility] ([facility])   MR#: [id number]                                    [address]   : ALEC Gomez 52, [address]: [id number]                             Fax: [phone]   (Exam #: [id number])      The LMP of this 31year old,  1, para 0 patient was ,   giving her an MALIA of SEP 1 2016 and a current gestational age of 25 weeks   0 days by dates  A sonographic examination was performed on 2016   using real time equipment  The ultrasound examination was performed using   abdominal & vaginal techniques  The patient has a BMI of 35 0  Her blood   pressure today was 114/77  Thank you very much for referring this very nice patient for a    evaluation and fetal anatomic survey  This is the patient's first   pregnancy that was complicated by exacerbation of her chronic migraines in   the first trimester that has since resolved  She has obesity with a BMI   of 35  She denies the current use of tobacco, alcohol, or drugs  She   takes prenatal vitamins and has taken on 3 occasions Tylenol with Codeine   for her migraines  The last time she took it was approximately 6 weeks   ago  She has no significant drug allergies  Her family medical history   is noncontributory  A review of systems is otherwise negative  On exam,   the patient appears well, in no acute distress, and her abdomen is   nontender              Cardiac motion was observed at 151 bpm       INDICATIONS      fetal anatomical survey   obesity      Exam Types      LEVEL II   Transvaginal      RESULTS      Fetus # 1 of 1   Vertex presentation   Fetal growth appeared normal   Placenta Location = Anterior   Placenta Grade = I      MEASUREMENTS (* Included In Average GA)      AC              14 0 cm        19 weeks 1 day * (33%)   BPD              4 4 cm        19 weeks 3 days* (37%)   HC 17 6 cm        20 weeks 0 days* (47%)   Femur            3 1 cm        19 weeks 6 days* (36%)      Nuchal Fold      2 2 mm      Humerus          3 0 cm        19 weeks 5 days  (46%)   Radius           2 3 cm        19 weeks 3 days   Ulna             2 7 cm        19 weeks 6 days   Tibia            3 0 cm        20 weeks 6 days  (68%)   Fibula           3 1 cm        20 weeks 3 days   Foot             3 3 cm        20 weeks 0 days      Cerebellum       1 9 cm        18 weeks 6 days   Biorbit          3 1 cm        20 weeks 0 days   CisternaMagna    3 1 mm      HC/AC           1 26   FL/AC           0 22   FL/BPD          0 70   EFW (Ac/Fl/Hc)   299 grams - 0 lbs 11 oz      THE AVERAGE GESTATIONAL AGE is 19 weeks 4 days +/- 10 days  AMNIOTIC FLUID      Largest Vertical Pocket = 4 5 cm   Amniotic Fluid: Normal      UTERINE ARTERIES                                  S/D   PI    RI    NOTCH       Left Uterine Artery        2 25  0 89  0 56       Right Uterine Artery       1 67  0 55  0 40      CERVICAL EVALUATION           Supine               Cervical Length: 3 50 cm        Other Test Results         Resp To T F  Pressure: No                    Funneling?: No              Dynamic Changes?: No      ANATOMY DETAILS      Visualized Appearing Sonographically Normal:   HEAD: (Calvarium, BPD Level, Lateral Ventricles, Choroid Plexus,   Cerebellum, Cisterna Magna);    FACE/NECK: (Neck, Nuchal Fold, Profile,   Orbits, Nose/Lips, Palate, Face);    TH  CAV : (Diaphragm); HEART:   (Four Chamber View, Proximal Left Outflow, Proximal Right Outflow, Aortic   Arch, Ductal Arch, Short Axis of Greater Vessels, Cardiac Axis,   Interventricular Septum, Interatrial Septum, Cardiac Position);    ABD    CAV , STOMACH, RIGHT KIDNEY, LEFT KIDNEY, BLADDER, ABD   WALL, SPINE:   (Cervical Spine, Thoracic Spine, Lumbar Spine, Sacrum);    EXTREMS: (Lt   Humerus, Rt Humerus, Lt Forearm, Rt Forearm, Lt Hand, Rt Hand, Lt Femur,   Rt Femur, Lt Low Leg, Rt Low Leg, Lt Foot, Rt Foot);    GENITALIA   (Female), PLACENTA, UMBL  CORD, UTERUS      ADNEXA      The left ovary appeared normal and measured 2 7 x 2 7 x 2 4 cm with a   volume of 9 2 cc  The right ovary appeared normal and measured 3 8 x 1 7 x   3 0 cm with a volume of 10 1 cc  IMPRESSION      Hough IUP   19 weeks and 4 days by this ultrasound  (MALIA=SEP 4 2016)   Vertex presentation   Fetal growth appeared normal   Normal anatomy survey   Regular fetal heart rate of 151 bpm   Anterior placenta      GENERAL COMMENT      The patient has declined genetic screening, and we discussed that a normal   ultrasound does not exclude all congenital birth defects or karyotypic   abnormalities  The fetal anatomic survey is complete  There is no sonographic evidence   of fetal abnormalities at this time  Good fetal movement and tone are   seen  The amniotic fluid volume appears normal   The placenta is anterior   and it appears sonographically normal  Uterine artery Dopplers are normal   for gestational age  A transvaginal ultrasound was performed to assess   the cervix, which was not seen well transabdominally  The cervical length   was 3 5 centimeters, which is normal for the current gestational age  There was no significant funneling or dynamic changes appreciated  The   patient was informed of today's findings and all of her questions were   answered  The limitations of ultrasound were reviewed with the patient,   which she accepts  We discussed the implications of obesity and pregnancy related to the risk   of adverse pregnancy outcomes including but not limited to, risk of   diabetes, hypertensive disorders of pregnancy, macrosomia, intrauterine   growth restriction, labor and shoulder dystocias,  section, and   increased risk of stillbirth    We discussed the current weight gain   recommendations for women with obesity and discussed good dietary   practices as well as the safety of exercise in pregnancy  We recommend the   patient gain no more than 10-15  pounds throughout her entire pregnancy,   increase her exercise and follow healthy dietary habits  Consider   referral to a dietitian should the patient have difficulty following the   aforementioned recommendations  Recommend third trimester growth   ultrasound to screen for fetal growth problems  Given the patient's maternal obesity, a fetal growth ultrasound in the   third trimester is recommended  This was not scheduled in the    Center as this may be done locally at your discretion  We would be happy   to see the patient for that followup ultrasound if you so desire  Please note, in addition to the time spent discussing the results of the   ultrasound, I spent approximately 10 minutes of face-to-face time with the   patient, greater than 50% of which was spent in counseling and the   coordination of care for this patient  Thank you very much for allowing us to participate in the care of this   very nice patient  Should you have any questions, please do not hesitate   to contact our office  BIJAN Bowser M D     Electronically signed 16 09:26

## 2018-01-15 NOTE — MISCELLANEOUS
Message   Date: 17 May 2016 9:42 AM EST, Recorded By: Sonya Ovalle For: Maricarmen Overton: Beulah Medeiros, Self   Phone: [phone] (Home)   Reason: Medical Complaint   Patient called, 25 weeks OB, c/o cold with sore throat, cough, congestion x 3 days, no fever  Advised Tylenol Cold and Sinus, Robitussin, Ocean nasal spray, rest, fluids  Advised call PCP if no improvement  Active Problems    1  Allergic rhinitis (477 9) (J30 9)   2  Blood pressure elevated (401 9) (I10)   3  Currently pregnant (V22 2) (Z33 1)   4  Migraine (346 90) (G43 909)   5  Overweight (278 02) (E66 3)   6  Urinary tract infection (599 0) (N39 0)    Current Meds   1  Acetaminophen-Codeine #3 300-30 MG Oral Tablet; Take one tablet 3 times daily as   needed for headache; Therapy: 69YCC5646 to (Last Rx:18Afy1253) Ordered   2  Prenatal Vitamins CAPS Recorded    Allergies    1  Ceftin TABS   2  Maxalt TABS    3  No Known Environmental Allergies   4   No Known Food Allergies    Signatures   Electronically signed by : Gladis Wiley, ; May 17 2016  9:44AM EST                       (Author)

## 2018-01-15 NOTE — MISCELLANEOUS
Message   Recorded as Task   Date: 09/20/2016 08:35 AM, Created By: Andreina Chaparro   Task Name: Care Coordination   Assigned To: Chase Romo   Regarding Patient: Nathen Sacks, Status: Active   Comment:    Andreina Chaparro - 20 Sep 2016 8:35 AM     TASK CREATED  I'm not sure who sent this specimen of the wound culture    Dr Charles Blood said that it was not a wound culture, it was nipple discharge  Deepika Sole He wants the lab clarified and changed for the pts chart    Elisaedwarddelphine Lewis   [facility] - 20 Sep 2016 8:41 AM     TASK REPLIED TO: Previously Assigned To Ivan Bell                      This is how it how it had to be done with the labeling in [employer]  It had to be done under wound culture unfortunately  They are working on changing this label with the newest update  Ivan Mckenzie - 20 Sep 2016 7:41 PM     TASK REPLIED TO: Previously Assigned To Chase Romo                      How is the patient doing? Elis Liao - 21 Sep 2016 7:15 AM     TASK REPLIED TO: Previously Assigned To [facility] told me she feels better  Janiya Zarate - 21 Sep 2016 8:00 AM     TASK REPLIED TO: Previously Assigned To [employer], great  Thanks        Active Problems    1  Acute mastitis of left breast (611 0) (N61)   2  Birth control counseling (V25 09) (Z30 9)   3  Migraine (346 90) (G43 909)   4  Need for diphtheria-tetanus-pertussis (Tdap) vaccine (V06 1) (Z23)   5  Overweight (278 02) (E66 3)    Current Meds   1  Acetaminophen-Codeine #3 300-30 MG Oral Tablet; Take one tablet 3 times daily as   needed for headache; Therapy: 86IZZ8368 to (Last Rx:80Wsf7294) Ordered   2  Cephalexin 500 MG Oral Tablet (Cephalexin Monohydrate); TAKE 1 TABLET Every 6   hours; Therapy: 48FQI0863 to (Evaluate:48Qah5049)  Requested for: 44Zin0760; Last   Rx:97Wjw3188 Ordered   3  Clindamycin HCl - 300 MG Oral Capsule; TAKE 1 CAPSULE 3 times daily;    Therapy: 15Rvr8823 to (Evaluate:44Pap8182)  Requested for: 01Jvx7369; Last   Rx:60Zyt8968 Ordered   4  Prenatal Vitamins CAPS Recorded    Allergies    1  Ceftin TABS   2  Maxalt TABS    3  No Known Environmental Allergies   4   No Known Food Allergies    Signatures   Electronically signed by : Erickson Boyle, ; Sep 21 2016  8:27AM EST                       (Author)

## 2018-01-16 NOTE — MISCELLANEOUS
Message  Return to work or school:   Valentino Alice is under my professional care  She was seen in my office on 07/26/2016       JOHN PELESCHAK CAME ALONG WITH HER TO THIS APPT  DR Ulises Castro / Abril Eaton        Signatures   Electronically signed by : Placido Pacheco, ; Jul 26 2016  1:45PM EST                       (Author)

## 2018-01-16 NOTE — MISCELLANEOUS
Message   Recorded as Task   Date: 10/18/2016 09:42 AM, Created By: Jaswant Castle   Task Name: Medical Complaint Callback   Assigned To: Mariana,Clinical Team   Regarding Patient: Corina Eisenmenger, Status: In Progress   Miguel Aidan - 18 Oct 2016 9:42 AM     TASK CREATED  Caller: Self; General Medical Question; [phone] (Home)  patient wanted to know if Dr Emir White would recommed her having gallbladder surgey  She was in the Hospital on the 12 of October and they said she should have it done  Her eyes are still a little yellow and she is on Keflex  Please call her at [phone]   Pat Daniels - 18 Oct 2016 10:31 AM     TASK REASSIGNED: Previously Assigned To Mariana,Clinical Team   Tom Bennett - 18 Oct 2016 10:33 AM     TASK REPLIED TO: Previously Assigned To Tom Bennett        Yes, if her eyes are yellow this can be a cause of liver dysfunction secondary to gallbladder disease  If the ER recommends to have the surgery I think she should follow their recommendations   Elis Burgos - 18 Oct 2016 3:08 PM     TASK IN PROGRESS   Elis Burgos - 18 Oct 2016 3:11 PM     TASK EDITED  pt notified of TS response - will follow up with surgeon recommended in ER        Active Problems    1  Abdominal pain, RUQ (789 01) (R10 11)   2  Acute mastitis of left breast (611 0) (N61 0)   3  Birth control counseling (V25 09) (Z30 9)   4  Flank pain (789 09) (R10 9)   5  Hematuria, microscopic (599 72) (R31 29)   6  Migraine (346 90) (G43 909)   7  Mother currently breast-feeding   8  Need for diphtheria-tetanus-pertussis (Tdap) vaccine (V06 1) (Z23)   9  Overweight (278 02) (E66 3)    Current Meds   1  Prenatal Vitamins CAPS Recorded    Allergies    1  Ceftin TABS   2  Maxalt TABS    3  No Known Environmental Allergies   4   No Known Food Allergies    Signatures   Electronically signed by : Pedro Segura, ; Oct 18 2016  3:11PM EST (Author)

## 2018-01-16 NOTE — PROGRESS NOTES
Active Problems    1  Currently pregnant (V22 2) (Z33 1)   2  Migraine (346 90) (G43 909)   3  Need for diphtheria-tetanus-pertussis (Tdap) vaccine (V06 1) (Z23)   4  Overweight (278 02) (E66 3)    Current Meds    1  Acetaminophen-Codeine #3 300-30 MG Oral Tablet; Take one tablet 3 times daily as   needed for headache; Therapy: 40UKA7343 to (Last Rx:77Wth7280) Ordered    2  Prenatal Vitamins CAPS Recorded    Allergies    1  Ceftin TABS   2  Maxalt TABS    3  No Known Environmental Allergies   4  No Known Food Allergies    Results/Data  [id number] Abdominal Ultrasound OB [facility]:   Procedure: The study was done today in the office  13948  Indication: EDC gestational age 35w7d with an MALIA 9/1/2016 weeks  Exam indication: growth/obesity  Findings:   Amniotic fluid volume: 10 2 + 28 0 + 14 0 + 39 4 = 91 6mm  Fetal heart beat: 144bpm    Placental location: anterior  Fetal position: vertex  Impression:     BPD 89 4mm 36w1d 87%     1mm 36w4d 61%     9mm 35w6d 84%    FL 70 1mm 36w0d 74%    HL 59 1mm 34w2d     The fetal anatomy has been previously assessed, however today's imaged anatomy including fetal kidneys, stomach, bladder, and diaphragm appear within normal limits  EFW 2814g 6lb 3oz 63%    Overall Impression: Appropriate interval growth  Future Appointments    Date/Time Provider Specialty Site   08/09/2016 05:45 PM NEDA Arenas  Obstetrics/Gynecology [facility]     Signatures   Electronically signed by : Abraham العلي, ; Jul 26 2016  1:29PM EST                       (Author)    Electronically signed by :  Glen Ribeiro MD; Jul 26 2016  2:01PM EST

## 2018-02-05 ENCOUNTER — DOCUMENTATION (OUTPATIENT)
Dept: FAMILY MEDICINE CLINIC | Facility: CLINIC | Age: 28
End: 2018-02-05

## 2018-02-05 ENCOUNTER — OFFICE VISIT (OUTPATIENT)
Dept: FAMILY MEDICINE CLINIC | Facility: CLINIC | Age: 28
End: 2018-02-05
Payer: COMMERCIAL

## 2018-02-05 VITALS
HEART RATE: 90 BPM | WEIGHT: 219 LBS | HEIGHT: 65 IN | SYSTOLIC BLOOD PRESSURE: 100 MMHG | DIASTOLIC BLOOD PRESSURE: 70 MMHG | TEMPERATURE: 99.8 F | BODY MASS INDEX: 36.49 KG/M2

## 2018-02-05 DIAGNOSIS — R50.9 FEVER, UNSPECIFIED FEVER CAUSE: ICD-10-CM

## 2018-02-05 DIAGNOSIS — J02.9 PHARYNGITIS, UNSPECIFIED ETIOLOGY: Primary | ICD-10-CM

## 2018-02-05 DIAGNOSIS — J01.90 ACUTE NON-RECURRENT SINUSITIS, UNSPECIFIED LOCATION: ICD-10-CM

## 2018-02-05 PROBLEM — E66.01 SEVERE OBESITY (BMI 35.0-39.9): Status: ACTIVE | Noted: 2017-09-26

## 2018-02-05 PROBLEM — R63.5 WEIGHT GAIN: Status: ACTIVE | Noted: 2017-09-26

## 2018-02-05 LAB — S PYO AG THROAT QL: NEGATIVE

## 2018-02-05 PROCEDURE — 3008F BODY MASS INDEX DOCD: CPT | Performed by: FAMILY MEDICINE

## 2018-02-05 PROCEDURE — 99214 OFFICE O/P EST MOD 30 MIN: CPT | Performed by: FAMILY MEDICINE

## 2018-02-05 PROCEDURE — 87880 STREP A ASSAY W/OPTIC: CPT | Performed by: FAMILY MEDICINE

## 2018-02-05 RX ORDER — DIPHENOXYLATE HYDROCHLORIDE AND ATROPINE SULFATE 2.5; .025 MG/1; MG/1
1 TABLET ORAL DAILY
COMMUNITY
End: 2018-11-28

## 2018-02-05 RX ORDER — AZITHROMYCIN 500 MG/1
500 TABLET, FILM COATED ORAL DAILY
Qty: 3 TABLET | Refills: 0 | Status: SHIPPED | OUTPATIENT
Start: 2018-02-05 | End: 2018-02-08

## 2018-02-05 RX ORDER — AZELASTINE 1 MG/ML
2 SPRAY, METERED NASAL 2 TIMES DAILY
COMMUNITY
Start: 2017-09-26 | End: 2018-11-28

## 2018-02-05 NOTE — LETTER
February 5, 2018     No Recipients    Patient: Elie Siemens   YOB: 1990   Date of Visit: 2/5/2018       Dear Dr Wiliam Bonilla Recipients: Thank you for referring Nirav Oakes to me for evaluation  Below are my notes for this consultation  If you have questions, please do not hesitate to call me  I look forward to following your patient along with you           Sincerely,        Aixa Talamantes        CC: No Recipients

## 2018-02-05 NOTE — PROGRESS NOTES
Assessment/Plan:    No problem-specific Assessment & Plan notes found for this encounter  Diagnoses and all orders for this visit:    Pharyngitis, unspecified etiology  -     POCT rapid strepA    Acute non-recurrent sinusitis, unspecified location  -     azithromycin (ZITHROMAX) 500 MG tablet; Take 1 tablet (500 mg total) by mouth daily for 3 days    Fever, unspecified fever cause    Other orders  -     azelastine (ASTELIN) 0 1 % nasal spray; 2 sprays into each nostril 2 (two) times a day  -     multivitamin (THERAGRAN) TABS; Take 1 tablet by mouth daily          Subjective:      Patient ID: Anahi Quezada is a 28 y o  female  CC:  Painful sore throat that started Saturday evening  Ears bothersome  Sinus pressure  Generalized weakness  Chills  -  lsh    HPI:  This is a 14-year-old female who comes in with an extremely sore throat, postnasal drip and sinus pressure  Symptoms began 3 days ago  She does have a sick contact in the family with her daughter having a positive strep culture  She is also experiencing fever and chills  Her ears are also bothersome  She denies any nausea, vomiting or diarrhea  She does not have a cough  Her blood pressure is 100/70 and her temperature is 99 8°  The following portions of the patient's history were reviewed and updated as appropriate: allergies, current medications, past family history, past medical history, past social history, past surgical history and problem list     Review of Systems   Constitutional: Positive for chills, fatigue and fever  HENT: Positive for congestion, ear pain, postnasal drip, rhinorrhea, sinus pressure and sore throat  Eyes: Negative  Respiratory: Negative  Cardiovascular: Negative  Gastrointestinal: Negative  Endocrine: Negative  Genitourinary: Negative  Musculoskeletal: Negative  Skin: Negative  Allergic/Immunologic: Negative  Neurological: Negative  Hematological: Negative  Psychiatric/Behavioral: Negative  Vitals:    02/05/18 1611   BP: 100/70   BP Location: Left arm   Patient Position: Sitting   Cuff Size: Large   Pulse: 90   Temp: 99 8 °F (37 7 °C)   TempSrc: Oral   Weight: 99 3 kg (219 lb)   Height: 5' 5" (1 651 m)   Objective:     Physical Exam   Constitutional: She is oriented to person, place, and time  She appears well-developed and well-nourished  HENT:   Head: Normocephalic  Erythema and dullness of tympanic membrane involving both ears  Positive postnasal drip, +2 erythema of posterior pharynx without exudates   Eyes: Conjunctivae and EOM are normal  Pupils are equal, round, and reactive to light  Neck: Normal range of motion  Neck supple  Cardiovascular: Normal rate, regular rhythm and normal heart sounds  Pulmonary/Chest: Effort normal and breath sounds normal  She has no wheezes  She has no rales  Abdominal: Soft  Bowel sounds are normal    Musculoskeletal: Normal range of motion  Lymphadenopathy:     She has cervical adenopathy  Neurological: She is alert and oriented to person, place, and time  Skin: Skin is warm  Psychiatric: She has a normal mood and affect  Her behavior is normal  Judgment and thought content normal    Nursing note and vitals reviewed

## 2018-02-05 NOTE — LETTER
February 6, 2018     Patient: Vena Flow   YOB: 1990   Date of Visit: 2/5/2018       To Whom It May Concern: It is my medical opinion that Jose Urias return's to work on Wednesday 2/7/18  If you have any questions or concerns, please don't hesitate to call           Sincerely,        Karla Olivo PA-C

## 2018-02-05 NOTE — ASSESSMENT & PLAN NOTE
The patient's rapid strep test was negative  She does have a sick family contact in her house with her daughter having positive strep from a culture

## 2018-02-06 NOTE — PROGRESS NOTES
I have reviewed the notes, assessments, and/or procedures performed , I concur with her/his documentation of Esme Johnson

## 2018-02-08 ENCOUNTER — TELEPHONE (OUTPATIENT)
Dept: FAMILY MEDICINE CLINIC | Facility: CLINIC | Age: 28
End: 2018-02-08

## 2018-02-08 NOTE — TELEPHONE ENCOUNTER
Pt was notified of response to continue with the OTC meds that were recommended at OV       She states she still has the white spots at the back of her throat and I reassured her that Zithromax is still working  I told her I would call her back if you recommended any change in antibiotic

## 2018-02-08 NOTE — TELEPHONE ENCOUNTER
Patient was here on Monday for strep and was prescribed antibiotics  She said that she does not feel any better and wants to know if she can have another antibiotic or what she can do  Please advise   Thank you  [facility] [address]

## 2018-02-08 NOTE — TELEPHONE ENCOUNTER
Please explain to the patient that she was given a Z-Davi for 3 days and that the Zithromax days in her system working for 10-14 days  This was explained to her in the room and she does not need another antibiotic  She should also use decongestant prescribed or the Coricidin HBP/cold and flu for the full 10 days

## 2018-02-28 ENCOUNTER — OFFICE VISIT (OUTPATIENT)
Dept: OBGYN CLINIC | Facility: CLINIC | Age: 28
End: 2018-02-28
Payer: COMMERCIAL

## 2018-02-28 VITALS
SYSTOLIC BLOOD PRESSURE: 122 MMHG | WEIGHT: 215.8 LBS | BODY MASS INDEX: 34.68 KG/M2 | HEIGHT: 66 IN | DIASTOLIC BLOOD PRESSURE: 76 MMHG

## 2018-02-28 DIAGNOSIS — Z97.5 IUD CONTRACEPTION: ICD-10-CM

## 2018-02-28 DIAGNOSIS — Z12.4 CERVICAL CANCER SCREENING: Primary | ICD-10-CM

## 2018-02-28 DIAGNOSIS — Z01.419 WOMEN'S ANNUAL ROUTINE GYNECOLOGICAL EXAMINATION: ICD-10-CM

## 2018-02-28 DIAGNOSIS — N91.1 SECONDARY AMENORRHEA: ICD-10-CM

## 2018-02-28 PROBLEM — J02.9 PHARYNGITIS: Status: RESOLVED | Noted: 2018-02-05 | Resolved: 2018-02-28

## 2018-02-28 PROBLEM — J01.90 ACUTE NON-RECURRENT SINUSITIS: Status: RESOLVED | Noted: 2018-02-05 | Resolved: 2018-02-28

## 2018-02-28 PROBLEM — R50.9 FEVER: Status: RESOLVED | Noted: 2018-02-05 | Resolved: 2018-02-28

## 2018-02-28 PROCEDURE — G0145 SCR C/V CYTO,THINLAYER,RESCR: HCPCS | Performed by: OBSTETRICS & GYNECOLOGY

## 2018-02-28 PROCEDURE — 99395 PREV VISIT EST AGE 18-39: CPT | Performed by: OBSTETRICS & GYNECOLOGY

## 2018-02-28 NOTE — PROGRESS NOTES
Assessment/Plan:  1  Yearly exam-Pap smear done, self-breast awareness reviewed  2  Secondary amenorrhea-related to Mirena IUD  Patient will call with any issues  3   Mirena IUD-placed 10/20/16  IUD string appears to be about 1 5 cm  She plans to get pregnant around the and of the year and may return for IUD removal in October or November 2018  She was encouraged to take ibuprofen or naproxen prior to this procedure  She was counseled about the procedure  4   History of irregular bleeding-resolved  5  History of ParaGard IUD-patient had heavy menses with this  She is much happier with Mirena IUD  6   Pre conceptual-patient plans pregnancy at the end of the year  She will start multivitamin with folic acid at least 1 month prior to conception  She will stay up-to-date with vaccinations  She denies any birth defects in her or her partner's family  Otherwise, she will follow up in 1 year or as needed  No problem-specific Assessment & Plan notes found for this encounter  Diagnoses and all orders for this visit:    Cervical cancer screening  -     Liquid-based pap, screening    Other orders  -     levonorgestrel (MIRENA) 20 MCG/24HR IUD; 1 each by Intrauterine route once          Subjective:      Patient ID: Alvina Perez is a 28 y o  female  Patient was seen today for yearly exam   Please see assessment plan for details  The following portions of the patient's history were reviewed and updated as appropriate: allergies, current medications, past family history, past medical history, past social history, past surgical history and problem list     Review of Systems   Constitutional: Negative for chills, diaphoresis, fatigue and fever  Respiratory: Negative for apnea, cough, chest tightness, shortness of breath and wheezing  Cardiovascular: Negative for chest pain, palpitations and leg swelling     Gastrointestinal: Negative for abdominal distention, abdominal pain, anal bleeding, constipation, diarrhea, nausea, rectal pain and vomiting  Genitourinary: Negative for difficulty urinating, dyspareunia, dysuria, frequency, hematuria, menstrual problem, pelvic pain, urgency, vaginal bleeding, vaginal discharge and vaginal pain  Musculoskeletal: Negative for arthralgias, back pain and myalgias  Skin: Negative for color change and rash  Neurological: Negative for dizziness, syncope, light-headedness, numbness and headaches  Hematological: Negative for adenopathy  Does not bruise/bleed easily  Psychiatric/Behavioral: Negative for dysphoric mood and sleep disturbance  The patient is not nervous/anxious  Objective:      /76 (BP Location: Right arm, Patient Position: Sitting, Cuff Size: Adult)   Ht 5' 5 5" (1 664 m)   Wt 97 9 kg (215 lb 12 8 oz)   BMI 35 36 kg/m²          Physical Exam    Objective      /76 (BP Location: Right arm, Patient Position: Sitting, Cuff Size: Adult)   Ht 5' 5 5" (1 664 m)   Wt 97 9 kg (215 lb 12 8 oz)   BMI 35 36 kg/m²     General:   alert and oriented, in no acute distress, alert, appears stated age and cooperative   Neck: normal to inspection and palpation   Breast: normal appearance, no masses or tenderness   Heart:    Lungs:    Abdomen: soft, non-tender, without masses or organomegaly   Vulva: normal   Vagina: Without lesions or discharge noted  Cervix: Without lesions or discharge or cervicitis  No Cervical motion tenderness    IUD string seen at a length of 1 5 cm   Uterus: top normal size, anteverted   Adnexa: no mass, fullness, tenderness   Rectum: negative

## 2018-03-09 LAB
LAB AP GYN PRIMARY INTERPRETATION: NORMAL
Lab: NORMAL

## 2018-03-12 ENCOUNTER — TELEPHONE (OUTPATIENT)
Dept: OBGYN CLINIC | Facility: CLINIC | Age: 28
End: 2018-03-12

## 2018-11-07 DIAGNOSIS — E66.01 MORBID (SEVERE) OBESITY DUE TO EXCESS CALORIES (HCC): ICD-10-CM

## 2018-11-07 DIAGNOSIS — H69.80 OTHER SPECIFIED DISORDERS OF EUSTACHIAN TUBE, UNSPECIFIED EAR: ICD-10-CM

## 2018-11-07 DIAGNOSIS — G43.909 MIGRAINE WITHOUT STATUS MIGRAINOSUS, NOT INTRACTABLE: ICD-10-CM

## 2018-11-07 DIAGNOSIS — Z13.220 ENCOUNTER FOR SCREENING FOR LIPOID DISORDERS: ICD-10-CM

## 2018-11-07 DIAGNOSIS — Z00.00 ENCOUNTER FOR GENERAL ADULT MEDICAL EXAMINATION WITHOUT ABNORMAL FINDINGS: ICD-10-CM

## 2018-11-07 DIAGNOSIS — J30.9 ALLERGIC RHINITIS: ICD-10-CM

## 2018-11-07 DIAGNOSIS — R63.5 ABNORMAL WEIGHT GAIN: ICD-10-CM

## 2018-11-28 ENCOUNTER — PROCEDURE VISIT (OUTPATIENT)
Dept: OBGYN CLINIC | Facility: CLINIC | Age: 28
End: 2018-11-28
Payer: COMMERCIAL

## 2018-11-28 VITALS — DIASTOLIC BLOOD PRESSURE: 78 MMHG | WEIGHT: 228.2 LBS | SYSTOLIC BLOOD PRESSURE: 120 MMHG | BODY MASS INDEX: 37.4 KG/M2

## 2018-11-28 DIAGNOSIS — Z31.69 ENCOUNTER FOR PRECONCEPTION CONSULTATION: ICD-10-CM

## 2018-11-28 DIAGNOSIS — Z30.432 ENCOUNTER FOR IUD REMOVAL: Primary | ICD-10-CM

## 2018-11-28 PROBLEM — Z97.5 IUD CONTRACEPTION: Status: RESOLVED | Noted: 2018-02-28 | Resolved: 2018-11-28

## 2018-11-28 PROCEDURE — 58301 REMOVE INTRAUTERINE DEVICE: CPT | Performed by: OBSTETRICS & GYNECOLOGY

## 2018-11-28 NOTE — PROGRESS NOTES
Iud removal  Date/Time: 11/28/2018 7:25 AM  Performed by: Rachel Bloom  Authorized by: Rachel Bloom     Consent:     Consent obtained:  Verbal and written    Consent given by:  Patient    Procedure risks and benefits discussed: yes      Patient questions answered: yes      Patient agrees, verbalizes understanding, and wants to proceed: yes      Educational handouts given: yes      Instructions and paperwork completed: yes    Procedure:     Removed with no complications: yes      Other reason for removal:  Desired fertility  Comments:      No complications

## 2018-11-28 NOTE — PATIENT INSTRUCTIONS
Planning for Pregnancy   WHAT YOU NEED TO KNOW:   Why is it important to plan for pregnancy? There are things you can do to get your body ready for a healthy pregnancy  A healthy pregnancy can improve your chance of having a healthy baby  The steps you need to take and the amount of time needed depends on your current health and habits  Work with your healthcare provider to help you plan a healthy pregnancy  What do I need to know about nutrition and exercise before pregnancy? · Eat a variety of healthy foods  Healthy foods include fruits, vegetables, whole-grain breads, low-fat dairy foods, beans, lean meats, and fish  Limit foods high in sugar, fat, and sodium  Limit your intake of fish to 2 servings each week  Choose fish low in mercury such as canned light tuna, shrimp, salmon, cod, or tilapia  Do not  eat fish high in mercury such as swordfish, tilefish, timur mackerel, and shark  · Take 400 micrograms (mcg) of folic acid each day  This will help to prevent birth defects of the brain and spine such as spina bifida  Most women should take folic acid before pregnancy and up to 12 weeks after getting pregnant  · Exercise for at least 30 minutes, 5 days a week  Some examples of exercise include walking, biking, dancing, and swimming  Include muscle strengthening activities 2 days each week  Regular exercise provides many health benefits  It helps you manage your weight, and decreases your risk for type 2 diabetes, heart disease, stroke, and high blood pressure  Exercise can also help improve your mood  Ask your healthcare provider about the best exercise plan for you  How does weight affect pregnancy? · Obesity  can make it harder for you to get pregnant  It also increases your risk of health problems during pregnancy  Some of these health problems include gestational diabetes, high blood pressure, and infections  It can also increase your baby's risk of health problems such as birth defects   Your baby may also be large and harder to deliver or be born prematurely (early)  Your risk of miscarriage is also higher if you are obese  Work with your healthcare provider to reach a healthy weight before you try to get pregnant  · Being underweight  can also make it hard for you to get pregnant  It can also increase your risk of having a premature baby and miscarriage  Your baby may be born at a low birth weight  What do I need to know about smoking, alcohol, and drugs? · Smoking  increases your risk of a miscarriage and other health problems during pregnancy  Smoking can cause your baby to be born too early or weigh less at birth  Ask your healthcare provider for information if you need help quitting  · Alcohol  passes from your body to your baby through the placenta  It can affect your baby's brain development and cause fetal alcohol syndrome (FAS)  FAS is a group of conditions that causes mental, behavior, and growth problems  Talk to your healthcare provider if you abuse alcohol and need help quitting before pregnancy  · Drugs , such as marijuana and cocaine, should not be used while you are trying to get pregnant or during pregnancy  They increase your risk of problems during pregnancy and increase the risk of having a baby with health problems  These include birth defects, premature birth, and infant death  What do I need to know about medicines and supplements? Tell your healthcare provider about all the medicines and supplements you take  Certain medicines and supplements should not be used during pregnancy  These include over-the-counter medicines, prescription medicines, vitamins, and herbal supplements  He or she may recommend that you take different medicines that are safer during pregnancy  What do I need to know about immunizations? Tell your healthcare provider about all the immunizations you have had   If you have missed any immunizations, your healthcare provider may recommend that you update your immunizations  These include hepatitis B, influenza, MMR (measles, mumps, rubella), Tdap, and varicella immunizations  What tests may I need to have before pregnancy? Your healthcare provider may recommend that you have tests to screen for sexually transmitted infections  These include chlamydia, gonorrhea, herpes, HIV infection, syphilis, and tuberculosis  These infectious diseases should be treated before pregnancy, if needed  What do I need to know about toxic substances? Toxic substances can harm a developing baby  Examples include cleaning products, paints, solvents, pesticides, and other chemical products  They can increase the risk of having a miscarriage, premature birth, and low-birth weight baby  They also increase the risk of developmental delay and childhood cancer  Avoid exposure to toxic substances and materials at work and home  What do I need to know about genetic testing? Tell your healthcare provider about your and your partner's family history of genetic disorders, developmental delays, or other disabilities  Also tell your healthcare provider about any problems you have had in previous pregnancies  Your healthcare provider may recommend that you see a healthcare professional called a genetic counselor  He or she will talk to you about how genes, birth defects, and other medical conditions are passed down  He or she can also tell you about your risk for passing a genetic disease in a future pregnancy  How can I prepare for pregnancy if I have a medical condition? Medical conditions such as diabetes, high blood pressure, asthma, seizure disorders, and thyroid disorders should be managed before pregnancy  Mental health conditions, such as depression and anxiety, should also be treated  This will decrease your risk of having health problems during pregnancy  It will also decrease your baby's risk of medical problems   Medicines used to treat certain conditions are not safe to use during pregnancy and may need to be changed before you get pregnant  Ask your healthcare provider if it is safe for you to get pregnant if you have a medical condition  CARE AGREEMENT:   You have the right to help plan your care  Learn about your health condition and how it may be treated  Discuss treatment options with your caregivers to decide what care you want to receive  You always have the right to refuse treatment  The above information is an  only  It is not intended as medical advice for individual conditions or treatments  Talk to your doctor, nurse or pharmacist before following any medical regimen to see if it is safe and effective for you  © 2017 [employer]  Information is for End User's use only and may not be sold, redistributed or otherwise used for commercial purposes  All illustrations and images included in CareNotes® are the copyrighted property of [employer]  or [employer]

## 2018-11-28 NOTE — PROGRESS NOTES
Assessment/Plan:  1  Mirena IUD removal-done without problems  Patient tolerated the procedure well  She will call with any issues  2  Secondary amenorrhea-this will likely resolve quickly after removal of Mirena IUD  She was counseled to expect menses shortly  3  Pre conceptual-patient plans to get pregnant in the near future  She denies any birth defects in her or her partner's family  She is status post flu vaccine status up-to-date with vaccines  She denies any medical issues  She is taking multivitamin with folic acid for spina bifida prevention  She was encouraged to call with  positive pregnancy test   4  History of irregular bleeding-no current issues  5  Other-patient had much better experience with Mirena IUD versus ParaGard previously  She would strongly wished to proceed with Mirena in the future for contraception after any pregnancy  Otherwise, she will follow-up after 2/28/19 for yearly exam or as needed  No problem-specific Assessment & Plan notes found for this encounter  Diagnoses and all orders for this visit:    Encounter for IUD removal  -     Iud removal          Subjective:      Patient ID: Valeriy Zimmer is a 33 y o  female  Patient was seen today for IUD removal   Please see assessment plan for details  The following portions of the patient's history were reviewed and updated as appropriate: allergies, current medications, past family history, past medical history, past social history, past surgical history and problem list     Review of Systems   Constitutional: Negative for chills, diaphoresis, fatigue and fever  Respiratory: Negative for apnea, cough, chest tightness, shortness of breath and wheezing  Cardiovascular: Negative for chest pain, palpitations and leg swelling  Gastrointestinal: Negative for abdominal distention, abdominal pain, anal bleeding, constipation, diarrhea, nausea, rectal pain and vomiting     Genitourinary: Negative for difficulty urinating, dyspareunia, dysuria, frequency, hematuria, menstrual problem, pelvic pain, urgency, vaginal bleeding, vaginal discharge and vaginal pain  Musculoskeletal: Negative for arthralgias, back pain and myalgias  Skin: Negative for color change and rash  Neurological: Negative for dizziness, syncope, light-headedness, numbness and headaches  Hematological: Negative for adenopathy  Does not bruise/bleed easily  Psychiatric/Behavioral: Negative for dysphoric mood and sleep disturbance  The patient is not nervous/anxious  Objective:      /78 (BP Location: Left arm, Patient Position: Sitting, Cuff Size: Standard)   Wt 104 kg (228 lb 3 2 oz)   BMI 37 40 kg/m²          Physical Exam    Objective      /78 (BP Location: Left arm, Patient Position: Sitting, Cuff Size: Standard)   Wt 104 kg (228 lb 3 2 oz)   BMI 37 40 kg/m²     General:   alert and oriented, in no acute distress, alert, appears stated age and cooperative   Neck:    Breast:    Heart:    Lungs:    Abdomen: soft, non-tender, without masses or organomegaly   Vulva: normal   Vagina: Without erythema or lesions or discharge  Normal   Cervix: Without lesions or discharge or cervicitis  No Cervical motion tenderness   and normal   Uterus: top normal size, anteverted, non-tender   Adnexa: no mass, fullness, tenderness   Rectum: deferred

## 2019-01-03 ENCOUNTER — OFFICE VISIT (OUTPATIENT)
Dept: URGENT CARE | Age: 29
End: 2019-01-03
Payer: COMMERCIAL

## 2019-01-03 VITALS
HEIGHT: 65 IN | BODY MASS INDEX: 38.49 KG/M2 | DIASTOLIC BLOOD PRESSURE: 90 MMHG | TEMPERATURE: 97.8 F | SYSTOLIC BLOOD PRESSURE: 163 MMHG | WEIGHT: 231 LBS | HEART RATE: 107 BPM | OXYGEN SATURATION: 97 % | RESPIRATION RATE: 16 BRPM

## 2019-01-03 DIAGNOSIS — R30.0 BURNING WITH URINATION: ICD-10-CM

## 2019-01-03 DIAGNOSIS — N30.90 CYSTITIS: Primary | ICD-10-CM

## 2019-01-03 LAB
SL AMB  POCT GLUCOSE, UA: NEGATIVE
SL AMB LEUKOCYTE ESTERASE,UA: ABNORMAL
SL AMB POCT BILIRUBIN,UA: NEGATIVE
SL AMB POCT BLOOD,UA: ABNORMAL
SL AMB POCT CLARITY,UA: ABNORMAL
SL AMB POCT COLOR,UA: ABNORMAL
SL AMB POCT KETONES,UA: NEGATIVE
SL AMB POCT NITRITE,UA: ABNORMAL
SL AMB POCT PH,UA: 6
SL AMB POCT SPECIFIC GRAVITY,UA: 1
SL AMB POCT URINE HCG: NEGATIVE
SL AMB POCT URINE PROTEIN: 300
SL AMB POCT UROBILINOGEN: 0.2

## 2019-01-03 PROCEDURE — 81002 URINALYSIS NONAUTO W/O SCOPE: CPT | Performed by: PHYSICIAN ASSISTANT

## 2019-01-03 PROCEDURE — 99213 OFFICE O/P EST LOW 20 MIN: CPT | Performed by: PHYSICIAN ASSISTANT

## 2019-01-03 RX ORDER — NITROFURANTOIN 25; 75 MG/1; MG/1
100 CAPSULE ORAL 2 TIMES DAILY
Qty: 10 CAPSULE | Refills: 0 | Status: SHIPPED | OUTPATIENT
Start: 2019-01-03 | End: 2019-01-08

## 2019-01-03 RX ORDER — NITROFURANTOIN 25; 75 MG/1; MG/1
100 CAPSULE ORAL 2 TIMES DAILY
Qty: 10 CAPSULE | Refills: 0 | Status: SHIPPED | OUTPATIENT
Start: 2019-01-03 | End: 2019-01-03 | Stop reason: SDUPTHER

## 2019-01-03 RX ORDER — PHENAZOPYRIDINE HYDROCHLORIDE 100 MG/1
100 TABLET, FILM COATED ORAL 3 TIMES DAILY PRN
Qty: 10 TABLET | Refills: 0 | Status: SHIPPED | OUTPATIENT
Start: 2019-01-03 | End: 2019-01-21

## 2019-01-04 NOTE — PATIENT INSTRUCTIONS
Take Phenazopyridine (after meals) and Nitrofurantoin as prescribed  Urine discoloration may occur with Phenazopyridine  Drink plenty of water   Cranberry supplements  Urinate within 5 minutes following intercourse  Follow up with OB/GYN  Follow up with PCP in 3-5 days  Proceed to  ER if symptoms worsen  Urinary Tract Infection in Women   WHAT YOU NEED TO KNOW:   A urinary tract infection (UTI) is caused by bacteria that get inside your urinary tract  Most bacteria that enter your urinary tract come out when you urinate  If the bacteria stay in your urinary tract, you may get an infection  Your urinary tract includes your kidneys, ureters, bladder, and urethra  Urine is made in your kidneys, and it flows from the ureters to the bladder  Urine leaves the bladder through the urethra  A UTI is more common in your lower urinary tract, which includes your bladder and urethra  DISCHARGE INSTRUCTIONS:   Return to the emergency department if:   · You are urinating very little or not at all  · You have a high fever with shaking chills  · You have side or back pain that gets worse  Contact your healthcare provider if:   · You have a fever  · You do not feel better after 2 days of taking antibiotics  · You are vomiting  · You have questions or concerns about your condition or care  Medicines:   · Antibiotics  help fight a bacterial infection  · Medicines  may be given to decrease pain and burning when you urinate  They will also help decrease the feeling that you need to urinate often  These medicines will make your urine orange or red  · Take your medicine as directed  Contact your healthcare provider if you think your medicine is not helping or if you have side effects  Tell him or her if you are allergic to any medicine  Keep a list of the medicines, vitamins, and herbs you take  Include the amounts, and when and why you take them  Bring the list or the pill bottles to follow-up visits  Carry your medicine list with you in case of an emergency  Follow up with your healthcare provider as directed:  Write down your questions so you remember to ask them during your visits  Prevent another UTI:   · Empty your bladder often  Urinate and empty your bladder as soon as you feel the need  Do not hold your urine for long periods of time  · Wipe from front to back after you urinate or have a bowel movement  This will help prevent germs from getting into your urinary tract through your urethra  · Drink liquids as directed  Ask how much liquid to drink each day and which liquids are best for you  You may need to drink more liquids than usual to help flush out the bacteria  Do not drink alcohol, caffeine, or citrus juices  These can irritate your bladder and increase your symptoms  Your healthcare provider may recommend cranberry juice to help prevent a UTI  · Urinate after you have sex  This can help flush out bacteria passed during sex  · Do not douche or use feminine deodorants  These can change the chemical balance in your vagina  · Change sanitary pads or tampons often  This will help prevent germs from getting into your urinary tract  · Do pelvic muscle exercises often  Pelvic muscle exercises may help you start and stop urinating  Strong pelvic muscles may help you empty your bladder easier  Squeeze these muscles tightly for 5 seconds like you are trying to hold back urine  Then relax for 5 seconds  Gradually work up to squeezing for 10 seconds  Do 3 sets of 15 repetitions a day, or as directed  © 2017 [employer] Information is for End User's use only and may not be sold, redistributed or otherwise used for commercial purposes  All illustrations and images included in CareNotes® are the copyrighted property of [employer]  or [employer]  The above information is an  only   It is not intended as medical advice for individual conditions or treatments  Talk to your doctor, nurse or pharmacist before following any medical regimen to see if it is safe and effective for you

## 2019-01-04 NOTE — PROGRESS NOTES
[facility]        NAME: Shayy Howard is a 33 y o  female  : 1990    MRN: [id number]  DATE: January 3, 2019  TIME: 7:23 PM    Assessment and Plan   Cystitis [N30 90]  1  Cystitis  phenazopyridine (PYRIDIUM) 100 mg tablet    nitrofurantoin (MACROBID) 100 mg capsule    DISCONTINUED: nitrofurantoin (MACROBID) 100 mg capsule   2  Burning with urination  POCT urine dip    POCT urine HCG         Patient Instructions     Take Phenazopyridine (after meals) and Nitrofurantoin as prescribed  Urine discoloration may occur with Phenazopyridine  Drink plenty of water   Cranberry supplements  Urinate within 5 minutes following intercourse  Follow up with OB/GYN  Follow up with PCP in 3-5 days  Proceed to  ER if symptoms worsen  Chief Complaint     Chief Complaint   Patient presents with    Possible UTI     Pt c/o burning with urination and urinary frequency  History of Present Illness       Urinary Tract Infection    This is a new problem  The current episode started in the past 7 days  The problem occurs every urination  The problem has been unchanged  The quality of the pain is described as aching and burning  The pain is moderate  There has been no fever  There is no history of pyelonephritis  Associated symptoms include frequency  Pertinent negatives include no chills, discharge, flank pain, hematuria, nausea, urgency or vomiting  She has tried nothing for the symptoms  Her past medical history is significant for catheterization (2016)  There is no history of kidney stones, recurrent UTIs, urinary stasis or a urological procedure  Review of Systems   Review of Systems   Constitutional: Negative for activity change, appetite change, chills and fever  Respiratory: Negative for cough and shortness of breath  Cardiovascular: Negative for chest pain and palpitations     Gastrointestinal: Negative for abdominal distention, abdominal pain, anal bleeding, blood in stool, constipation, diarrhea, nausea and vomiting  Genitourinary: Positive for dysuria and frequency  Negative for decreased urine volume, flank pain, hematuria, urgency, vaginal bleeding and vaginal discharge  Neurological: Negative for dizziness, light-headedness and headaches  Current Medications       Current Outpatient Prescriptions:     nitrofurantoin (MACROBID) 100 mg capsule, Take 1 capsule (100 mg total) by mouth 2 (two) times a day for 5 days, Disp: 10 capsule, Rfl: 0    phenazopyridine (PYRIDIUM) 100 mg tablet, Take 1 tablet (100 mg total) by mouth 3 (three) times a day as needed for bladder spasms, Disp: 10 tablet, Rfl: 0    Current Allergies     Allergies as of 2019 - Reviewed 2019   Allergen Reaction Noted    Cefuroxime GI Intolerance 2012    Rizatriptan Nausea Only 2012            The following portions of the patient's history were reviewed and updated as appropriate: allergies, current medications, past family history, past medical history, past social history, past surgical history and problem list      Past Medical History:   Diagnosis Date    Acute sinusitis     24 may 2017 resolved    Breastfeeding (infant)     Dysfunction of eustachian tube     24 may 2017    unspecified laterality    Elevated blood pressure reading     2016  resolved    Gallbladder pain     "sporadic"    Gave birth to child recently     2016    IUD (intrauterine device) in place     2016  resolved    Migraine        Past Surgical History:   Procedure Laterality Date     SECTION N/A 2016    Procedure:  SECTION ();   Surgeon: Homa Cameron MD;  [facility]: [facility];  Service:     CHOLECYSTECTOMY      laparoscopic    COLONOSCOPY      EAR SURGERY Bilateral     ear pressure equalization tube bilateral    GALLBLADDER SURGERY      INTRAUTERINE DEVICE INSERTION      MYRINGOTOMY W/ TUBES Left     CT LAP,CHOLECYSTECTOMY/GRAPH N/A 11/3/2016    Procedure: LAPAROSCOPIC CHOLECYSTECTOMY WITH INTROPERATIVE CHOLANGIOGRAM; LYSIS OF ADHESIONS;  Surgeon: Ellyn Sabillon MD;  Location: [facility];  Service: General    WISDOM TOOTH EXTRACTION         Family History   Problem Relation Age of Onset    Breast cancer Mother     Heart attack Father     Heart disease Father     Heart attack Paternal Grandfather     Heart disease Paternal Grandfather     Breast cancer Maternal Aunt          Medications have been verified  Objective   /90   Pulse (!) 107   Temp 97 8 °F (36 6 °C) (Tympanic)   Resp 16   Ht 5' 5" (1 651 m)   Wt 105 kg (231 lb)   LMP 12/14/2018   SpO2 97%   BMI 38 44 kg/m²        Physical Exam     Physical Exam   Constitutional: She appears well-developed and well-nourished  No distress  Cardiovascular: Normal rate, regular rhythm and normal heart sounds  Exam reveals no gallop and no friction rub  No murmur heard  Pulmonary/Chest: Effort normal and breath sounds normal  No respiratory distress  She has no wheezes  She has no rales  She exhibits no tenderness  Abdominal: Soft  There is tenderness (mild suprapubic)  Negative CVA tenderness  Neurological: She is alert  Skin: Skin is warm  She is not diaphoretic  Psychiatric: She has a normal mood and affect  Her behavior is normal  Judgment and thought content normal    Vitals reviewed

## 2019-01-21 ENCOUNTER — OFFICE VISIT (OUTPATIENT)
Dept: FAMILY MEDICINE CLINIC | Facility: CLINIC | Age: 29
End: 2019-01-21
Payer: COMMERCIAL

## 2019-01-21 VITALS
BODY MASS INDEX: 36.63 KG/M2 | WEIGHT: 233.4 LBS | HEART RATE: 80 BPM | HEIGHT: 67 IN | TEMPERATURE: 98.7 F | DIASTOLIC BLOOD PRESSURE: 66 MMHG | SYSTOLIC BLOOD PRESSURE: 114 MMHG

## 2019-01-21 DIAGNOSIS — E66.01 SEVERE OBESITY (BMI 35.0-39.9) WITH COMORBIDITY (HCC): ICD-10-CM

## 2019-01-21 DIAGNOSIS — R63.5 WEIGHT GAIN: ICD-10-CM

## 2019-01-21 DIAGNOSIS — Z00.00 HEALTH CARE MAINTENANCE: ICD-10-CM

## 2019-01-21 DIAGNOSIS — G43.C0 PERIODIC HEADACHE SYNDROME, NOT INTRACTABLE: Primary | ICD-10-CM

## 2019-01-21 DIAGNOSIS — Z78.9 TRYING TO GET PREGNANT: ICD-10-CM

## 2019-01-21 DIAGNOSIS — E66.01 SEVERE OBESITY WITH BODY MASS INDEX (BMI) OF 35.0 TO 39.9 WITH SERIOUS COMORBIDITY (HCC): ICD-10-CM

## 2019-01-21 PROCEDURE — 3008F BODY MASS INDEX DOCD: CPT | Performed by: FAMILY MEDICINE

## 2019-01-21 PROCEDURE — 99214 OFFICE O/P EST MOD 30 MIN: CPT | Performed by: FAMILY MEDICINE

## 2019-01-21 PROCEDURE — 1036F TOBACCO NON-USER: CPT | Performed by: FAMILY MEDICINE

## 2019-01-21 RX ORDER — ACETAMINOPHEN AND CODEINE PHOSPHATE 300; 30 MG/1; MG/1
1 TABLET ORAL EVERY 8 HOURS PRN
Qty: 30 TABLET | Refills: 0 | Status: SHIPPED | OUTPATIENT
Start: 2019-01-21 | End: 2020-08-07 | Stop reason: HOSPADM

## 2019-01-21 NOTE — PROGRESS NOTES
Assessment/Plan:  1  Migraine headache Tylenol with codeine prescribed risk and benefit discussed especially GI and drowsiness  2  Actively trying to become pregnant that is wearing Tylenol with codeine is being used  We used this in her 1st pregnancy  3  Severe obesity/waking, diet exercise and weight loss discussed  4  Health maintenance, form for were completed  5  Return in 6 months sooner if needed      Health care maintenance  Form for work completed that she had a physical    Weight gain  Weight loss discussed    Migraine headache  Tylenol with codeine ordered risk and benefit discussed especially drowsiness and GI upset    Severe obesity (BMI 35 0-39  9)  Weight loss discussed       Diagnoses and all orders for this visit:    Periodic headache syndrome, not intractable  -     acetaminophen-codeine (TYLENOL #3) 300-30 mg per tablet; Take 1 tablet by mouth every 8 (eight) hours as needed (Headache)    Severe obesity (BMI 35 0-39  9)    Weight gain    Health care maintenance    Trying to get pregnant    Severe obesity (BMI 35 0-39  9) with comorbidity ([address] )          Subjective: pt presents for work physical   Also wishes to discuss migraine meds  She has started having migraines since going of OCP  --bb     Patient ID: Prabha Valenzuela is a 33 y o  female  Patient is here to discuss her headaches  Patient is migraine headaches  However she is actively trying to become pregnant  Last pregnancy use Tylenol with codeine p r n     Will prescribe Tylenol with codeine risk and benefit discussed with the patient especially drowsiness and GI upset  Patient did gain 2 lb since last office visit BMI is 37    Otherwise patient is doing well without any medical complaints or concerns        The following portions of the patient's history were reviewed and updated as appropriate: allergies, current medications, past family history, past medical history, past social history, past surgical history and problem list     Review of Systems   Constitutional: Positive for unexpected weight change  HENT: Negative  Eyes: Negative  Respiratory: Negative  Cardiovascular: Negative  Gastrointestinal: Negative  Endocrine: Negative  Genitourinary:        HPI   Musculoskeletal: Negative  Skin: Negative  Allergic/Immunologic: Negative  Neurological:        HPI   Hematological: Negative  Psychiatric/Behavioral: Negative  Objective:      /66 (BP Location: Left arm, Patient Position: Sitting, Cuff Size: Large)   Pulse 80   Temp 98 7 °F (37 1 °C) (Tympanic)   Ht 5' 6 5" (1 689 m)   Wt 106 kg (233 lb 6 4 oz)   LMP 01/18/2019 (Exact Date)   BMI 37 11 kg/m²          Physical Exam   Constitutional: She is oriented to person, place, and time  She appears well-developed and well-nourished  HENT:   Head: Normocephalic and atraumatic  Right Ear: External ear normal    Left Ear: External ear normal    Nose: Nose normal    Mouth/Throat: Oropharynx is clear and moist  No oropharyngeal exudate  Eyes: Pupils are equal, round, and reactive to light  Conjunctivae and EOM are normal  No scleral icterus  Neck: Neck supple  No thyromegaly present  Cardiovascular: Normal rate, regular rhythm and normal heart sounds  Pulmonary/Chest: Effort normal and breath sounds normal    Abdominal: Soft  Bowel sounds are normal  There is no tenderness  Musculoskeletal: She exhibits no edema  Lymphadenopathy:     She has no cervical adenopathy  Neurological: She is alert and oriented to person, place, and time  No cranial nerve deficit  Skin: Skin is warm and dry  Psychiatric: She has a normal mood and affect  BMI Counseling: Body mass index is 37 11 kg/m²  Discussed the patient's BMI with her  The BMI is above average  BMI counseling and education was provided to the patient  Nutrition recommendations include reducing intake of cholesterol   Exercise recommendations include exercising 3-5 times per week

## 2019-03-27 ENCOUNTER — ANNUAL EXAM (OUTPATIENT)
Dept: OBGYN CLINIC | Facility: CLINIC | Age: 29
End: 2019-03-27
Payer: COMMERCIAL

## 2019-03-27 VITALS
SYSTOLIC BLOOD PRESSURE: 118 MMHG | HEIGHT: 65 IN | BODY MASS INDEX: 39.05 KG/M2 | DIASTOLIC BLOOD PRESSURE: 62 MMHG | WEIGHT: 234.4 LBS

## 2019-03-27 DIAGNOSIS — Z01.419 WOMEN'S ANNUAL ROUTINE GYNECOLOGICAL EXAMINATION: Primary | ICD-10-CM

## 2019-03-27 DIAGNOSIS — Z31.69 ENCOUNTER FOR PRECONCEPTION CONSULTATION: ICD-10-CM

## 2019-03-27 PROBLEM — N91.1 SECONDARY AMENORRHEA: Status: RESOLVED | Noted: 2018-02-28 | Resolved: 2019-03-27

## 2019-03-27 PROCEDURE — 99395 PREV VISIT EST AGE 18-39: CPT | Performed by: OBSTETRICS & GYNECOLOGY

## 2019-03-27 NOTE — PROGRESS NOTES
Assessment/Plan   Diagnoses and all orders for this visit:    Women's annual routine gynecological examination    Other orders  -     Prenatal Vit-DSS-Fe Cbn-FA (PRENATAL AD PO); Take by mouth     1  Yearly exam-Pap smear deferred, self-breast awareness reviewed  2  Pre conceptual-patient had a removal of Mirena IUD 18 with resolution of secondary amenorrhea  She has normal menses at this time  She is taking prenatal vitamin with folic acid  She denies any medical issues or family history of birth defects in her or her partner's family  She is up-to-date with vaccinations  She was counseled about intercourse in the mid cycle at the time of ovulation  She will call with positive pregnancy test for pregnancy care  3  Prior history of irregular menses-no current issues  4  Other-  patient was much happier with Mirena IUD versus ParaGard previously  Should she proceed with IUD in the future, would strongly consider Mirena IUD  She will follow-up in 1 year for yearly exam or as needed  Subjective   Patient ID: Ramirez Low is a 33 y o  female  Vitals:    19 0851   BP: 118/62     Patient was seen today for yearly exam   Please see assessment plan for details        The following portions of the patient's history were reviewed and updated as appropriate: allergies, current medications, past family history, past medical history, past social history, past surgical history and problem list   Past Medical History:   Diagnosis Date    Acute sinusitis     24 may 2017 resolved    Breastfeeding (infant)     Dysfunction of eustachian tube     24 may 2017    unspecified laterality    Elevated blood pressure reading     2016  resolved    Gallbladder pain     "sporadic"    Gave birth to child recently     2016    IUD (intrauterine device) in place     2016  resolved    Migraine      Past Surgical History:   Procedure Laterality Date     SECTION N/A 2016    Procedure:  SECTION ();   Surgeon: Rakel Morris MD;  Location[facility];  Service:     CHOLECYSTECTOMY      laparoscopic    COLONOSCOPY      EAR SURGERY Bilateral     ear pressure equalization tube bilateral    GALLBLADDER SURGERY      INTRAUTERINE DEVICE INSERTION      MYRINGOTOMY W/ TUBES Left     [place] LAP,CHOLECYSTECTOMY/GRAPH N/A 11/3/2016    Procedure: LAPAROSCOPIC CHOLECYSTECTOMY WITH INTROPERATIVE CHOLANGIOGRAM; LYSIS OF ADHESIONS;  Surgeon: Brayan Emerson MD;  Location: [facility];  Service: General    WISDOM TOOTH EXTRACTION       OB History    Para Term  AB Living   1 1 1 0 0 1   SAB TAB Ectopic Multiple Live Births   0 0 0 0 1      # Outcome Date GA Lbr Enio/2nd Weight Sex Delivery Anes PTL Lv   1 Term 16 39w0d  2994 g (6 lb 9 6 oz) F CS-LTranv Spinal N LORENZO       Current Outpatient Medications:     acetaminophen-codeine (TYLENOL #3) 300-30 mg per tablet, Take 1 tablet by mouth every 8 (eight) hours as needed (Headache), Disp: 30 tablet, Rfl: 0    Prenatal Vit-DSS-Fe Cbn-FA (PRENATAL AD PO), Take by mouth, Disp: , Rfl:   Allergies   Allergen Reactions    Cefuroxime GI Intolerance    Rizatriptan Nausea Only     Social History     Socioeconomic History    Marital status: /Civil Union     Spouse name: None    Number of children: 1    Years of education: None    Highest education level: None   Occupational History    None   Social Needs    Financial resource strain: None    Food insecurity:     Worry: None     Inability: None    Transportation needs:     Medical: None     Non-medical: None   Tobacco Use    Smoking status: Never Smoker    Smokeless tobacco: Never Used    Tobacco comment: no nd hand smoke exposure   Substance and Sexual Activity    Alcohol use: Yes     Comment: socially    Drug use: No    Sexual activity: Yes   Lifestyle    Physical activity:     Days per week: None     Minutes per session: None    Stress: None Relationships    Social connections:     Talks on phone: None     Gets together: None     Attends [employer] service: None     Active member of club or organization: None     Attends meetings of clubs or organizations: None     Relationship status: None    Intimate partner violence:     Fear of current or ex partner: None     Emotionally abused: None     Physically abused: None     Forced sexual activity: None   Other Topics Concern    None   Social History Narrative    Caffeine use    No [employer] affiliation    Uses safety equipment seat belts     Family History   Problem Relation Age of Onset    Breast cancer Mother     Heart attack Father     Heart disease Father     Heart attack Paternal Grandfather     Heart disease Paternal Grandfather     Breast cancer Maternal Aunt        Review of Systems   Constitutional: Negative for chills, diaphoresis, fatigue and fever  Respiratory: Negative for apnea, cough, chest tightness, shortness of breath and wheezing  Cardiovascular: Negative for chest pain, palpitations and leg swelling  Gastrointestinal: Negative for abdominal distention, abdominal pain, anal bleeding, constipation, diarrhea, nausea, rectal pain and vomiting  Genitourinary: Negative for difficulty urinating, dyspareunia, dysuria, frequency, hematuria, menstrual problem, pelvic pain, urgency, vaginal bleeding, vaginal discharge and vaginal pain  Musculoskeletal: Negative for arthralgias, back pain and myalgias  Skin: Negative for color change and rash  Neurological: Negative for dizziness, syncope, light-headedness, numbness and headaches  Hematological: Negative for adenopathy  Does not bruise/bleed easily  Psychiatric/Behavioral: Negative for dysphoric mood and sleep disturbance  The patient is not nervous/anxious          Objective   Physical Exam    Objective      /62 (BP Location: Left arm, Patient Position: Sitting, Cuff Size: Standard)   Ht 5' 5" (1 651 m)   Wt 106 kg (234 lb 6 4 oz)   LMP 03/17/2019 (Exact Date)   BMI 39 01 kg/m²     General:   alert and oriented, in no acute distress   Neck: normal to inspection and palpation   Breast: normal appearance, no masses or tenderness   Heart:    Lungs:    Abdomen: soft, non-tender, without masses or organomegaly   Vulva: normal   Vagina: Without erythema or lesions or discharge  Normal   Cervix: Without lesions or discharge or cervicitis    No Cervical motion tenderness   Uterus: top normal size, anteverted, non-tender   Adnexa: no mass, fullness, tenderness   Rectum: negative    Psych:  Normal mood and affect   Skin:  Without obvious lesions   Eyes: symmetric, with normal movements and reactivity   Musculoskeletal:  Normal muscle tone and movements appreciated

## 2019-06-01 ENCOUNTER — HOSPITAL ENCOUNTER (EMERGENCY)
Facility: HOSPITAL | Age: 29
Discharge: HOME | End: 2019-06-01
Attending: EMERGENCY MEDICINE
Payer: COMMERCIAL

## 2019-06-01 VITALS
BODY MASS INDEX: 40.17 KG/M2 | SYSTOLIC BLOOD PRESSURE: 103 MMHG | RESPIRATION RATE: 18 BRPM | DIASTOLIC BLOOD PRESSURE: 57 MMHG | TEMPERATURE: 97.1 F | HEART RATE: 75 BPM | OXYGEN SATURATION: 99 % | WEIGHT: 241.4 LBS

## 2019-06-01 DIAGNOSIS — G43.909 MIGRAINE HEADACHE: ICD-10-CM

## 2019-06-01 DIAGNOSIS — R10.13 EPIGASTRIC PAIN: Primary | ICD-10-CM

## 2019-06-01 LAB
ALBUMIN SERPL BCP-MCNC: 4 G/DL (ref 3.5–5)
ALP SERPL-CCNC: 86 U/L (ref 46–116)
ALT SERPL W P-5'-P-CCNC: 24 U/L (ref 12–78)
ANION GAP SERPL CALCULATED.3IONS-SCNC: 11 MMOL/L (ref 4–13)
AST SERPL W P-5'-P-CCNC: 19 U/L (ref 5–45)
BASOPHILS # BLD AUTO: 0.03 THOUSANDS/ΜL (ref 0–0.1)
BASOPHILS NFR BLD AUTO: 0 % (ref 0–1)
BILIRUB SERPL-MCNC: 0.15 MG/DL (ref 0.2–1)
BILIRUB UR QL STRIP: NEGATIVE
BUN SERPL-MCNC: 13 MG/DL (ref 5–25)
CALCIUM SERPL-MCNC: 9.6 MG/DL (ref 8.3–10.1)
CHLORIDE SERPL-SCNC: 101 MMOL/L (ref 100–108)
CLARITY UR: CLEAR
CLARITY, POC: CLEAR
CO2 SERPL-SCNC: 25 MMOL/L (ref 21–32)
COLOR UR: YELLOW
COLOR, POC: YELLOW
CREAT SERPL-MCNC: 0.77 MG/DL (ref 0.6–1.3)
EOSINOPHIL # BLD AUTO: 0.06 THOUSAND/ΜL (ref 0–0.61)
EOSINOPHIL NFR BLD AUTO: 1 % (ref 0–6)
ERYTHROCYTE [DISTWIDTH] IN BLOOD BY AUTOMATED COUNT: 13.2 % (ref 11.6–15.1)
EXT PREG TEST URINE: NEGATIVE
GFR SERPL CREATININE-BSD FRML MDRD: 104 ML/MIN/1.73SQ M
GLUCOSE SERPL-MCNC: 96 MG/DL (ref 65–140)
GLUCOSE UR STRIP-MCNC: NEGATIVE MG/DL
HCT VFR BLD AUTO: 41.8 % (ref 34.8–46.1)
HGB BLD-MCNC: 13.5 G/DL (ref 11.5–15.4)
HGB UR QL STRIP.AUTO: NEGATIVE
IMM GRANULOCYTES # BLD AUTO: 0.03 THOUSAND/UL (ref 0–0.2)
IMM GRANULOCYTES NFR BLD AUTO: 0 % (ref 0–2)
KETONES UR STRIP-MCNC: NEGATIVE MG/DL
LEUKOCYTE ESTERASE UR QL STRIP: NEGATIVE
LIPASE SERPL-CCNC: 122 U/L (ref 73–393)
LYMPHOCYTES # BLD AUTO: 3.5 THOUSANDS/ΜL (ref 0.6–4.47)
LYMPHOCYTES NFR BLD AUTO: 39 % (ref 14–44)
MAGNESIUM SERPL-MCNC: 2 MG/DL (ref 1.6–2.6)
MCH RBC QN AUTO: 28.5 PG (ref 26.8–34.3)
MCHC RBC AUTO-ENTMCNC: 32.3 G/DL (ref 31.4–37.4)
MCV RBC AUTO: 88 FL (ref 82–98)
MONOCYTES # BLD AUTO: 0.66 THOUSAND/ΜL (ref 0.17–1.22)
MONOCYTES NFR BLD AUTO: 7 % (ref 4–12)
NEUTROPHILS # BLD AUTO: 4.66 THOUSANDS/ΜL (ref 1.85–7.62)
NEUTS SEG NFR BLD AUTO: 53 % (ref 43–75)
NITRITE UR QL STRIP: NEGATIVE
NRBC BLD AUTO-RTO: 0 /100 WBCS
PH UR STRIP.AUTO: 6 [PH] (ref 4.5–8)
PLATELET # BLD AUTO: 317 THOUSANDS/UL (ref 149–390)
PMV BLD AUTO: 9.9 FL (ref 8.9–12.7)
POTASSIUM SERPL-SCNC: 3.5 MMOL/L (ref 3.5–5.3)
PROT SERPL-MCNC: 8.4 G/DL (ref 6.4–8.2)
PROT UR STRIP-MCNC: NEGATIVE MG/DL
RBC # BLD AUTO: 4.73 MILLION/UL (ref 3.81–5.12)
SODIUM SERPL-SCNC: 137 MMOL/L (ref 136–145)
SP GR UR STRIP.AUTO: 1.02 (ref 1–1.03)
UROBILINOGEN UR QL STRIP.AUTO: 0.2 E.U./DL
WBC # BLD AUTO: 8.94 THOUSAND/UL (ref 4.31–10.16)

## 2019-06-01 PROCEDURE — 83690 ASSAY OF LIPASE: CPT | Performed by: EMERGENCY MEDICINE

## 2019-06-01 PROCEDURE — 81025 URINE PREGNANCY TEST: CPT | Performed by: EMERGENCY MEDICINE

## 2019-06-01 PROCEDURE — 93005 ELECTROCARDIOGRAM TRACING: CPT

## 2019-06-01 PROCEDURE — 99284 EMERGENCY DEPT VISIT MOD MDM: CPT | Performed by: EMERGENCY MEDICINE

## 2019-06-01 PROCEDURE — 85025 COMPLETE CBC W/AUTO DIFF WBC: CPT | Performed by: EMERGENCY MEDICINE

## 2019-06-01 PROCEDURE — 96375 TX/PRO/DX INJ NEW DRUG ADDON: CPT

## 2019-06-01 PROCEDURE — 99285 EMERGENCY DEPT VISIT HI MDM: CPT

## 2019-06-01 PROCEDURE — 96361 HYDRATE IV INFUSION ADD-ON: CPT

## 2019-06-01 PROCEDURE — 80053 COMPREHEN METABOLIC PANEL: CPT | Performed by: EMERGENCY MEDICINE

## 2019-06-01 PROCEDURE — 36415 COLL VENOUS BLD VENIPUNCTURE: CPT | Performed by: EMERGENCY MEDICINE

## 2019-06-01 PROCEDURE — 81003 URINALYSIS AUTO W/O SCOPE: CPT

## 2019-06-01 PROCEDURE — 83735 ASSAY OF MAGNESIUM: CPT | Performed by: EMERGENCY MEDICINE

## 2019-06-01 PROCEDURE — 96374 THER/PROPH/DIAG INJ IV PUSH: CPT

## 2019-06-01 RX ORDER — METOCLOPRAMIDE HYDROCHLORIDE 5 MG/ML
10 INJECTION INTRAMUSCULAR; INTRAVENOUS ONCE
Status: COMPLETED | OUTPATIENT
Start: 2019-06-01 | End: 2019-06-01

## 2019-06-01 RX ORDER — DIPHENHYDRAMINE HYDROCHLORIDE 50 MG/ML
25 INJECTION INTRAMUSCULAR; INTRAVENOUS ONCE
Status: COMPLETED | OUTPATIENT
Start: 2019-06-01 | End: 2019-06-01

## 2019-06-01 RX ORDER — KETOROLAC TROMETHAMINE 30 MG/ML
30 INJECTION, SOLUTION INTRAMUSCULAR; INTRAVENOUS ONCE
Status: COMPLETED | OUTPATIENT
Start: 2019-06-01 | End: 2019-06-01

## 2019-06-01 RX ADMIN — KETOROLAC TROMETHAMINE 30 MG: 30 INJECTION, SOLUTION INTRAMUSCULAR; INTRAVENOUS at 21:28

## 2019-06-01 RX ADMIN — DIPHENHYDRAMINE HYDROCHLORIDE 25 MG: 50 INJECTION, SOLUTION INTRAMUSCULAR; INTRAVENOUS at 21:32

## 2019-06-01 RX ADMIN — FAMOTIDINE 20 MG: 10 INJECTION, SOLUTION INTRAVENOUS at 21:33

## 2019-06-01 RX ADMIN — METOCLOPRAMIDE 10 MG: 5 INJECTION, SOLUTION INTRAMUSCULAR; INTRAVENOUS at 21:30

## 2019-06-01 RX ADMIN — SODIUM CHLORIDE 1000 ML: 0.9 INJECTION, SOLUTION INTRAVENOUS at 21:25

## 2019-06-02 LAB
ATRIAL RATE: 85 BPM
P AXIS: 44 DEGREES
PR INTERVAL: 146 MS
QRS AXIS: 62 DEGREES
QRSD INTERVAL: 106 MS
QT INTERVAL: 372 MS
QTC INTERVAL: 442 MS
T WAVE AXIS: 44 DEGREES
VENTRICULAR RATE: 85 BPM

## 2019-06-02 PROCEDURE — 93010 ELECTROCARDIOGRAM REPORT: CPT | Performed by: INTERNAL MEDICINE

## 2019-09-26 ENCOUNTER — OFFICE VISIT (OUTPATIENT)
Dept: OBGYN CLINIC | Facility: CLINIC | Age: 29
End: 2019-09-26
Payer: COMMERCIAL

## 2019-09-26 VITALS
WEIGHT: 234 LBS | BODY MASS INDEX: 38.99 KG/M2 | DIASTOLIC BLOOD PRESSURE: 82 MMHG | HEIGHT: 65 IN | SYSTOLIC BLOOD PRESSURE: 128 MMHG

## 2019-09-26 DIAGNOSIS — Z32.00 POSSIBLE PREGNANCY: Primary | ICD-10-CM

## 2019-09-26 DIAGNOSIS — Z31.69 ENCOUNTER FOR PRECONCEPTION CONSULTATION: ICD-10-CM

## 2019-09-26 DIAGNOSIS — N92.6 IRREGULAR MENSES: ICD-10-CM

## 2019-09-26 LAB — SL AMB POCT URINE HCG: NEGATIVE

## 2019-09-26 PROCEDURE — 99214 OFFICE O/P EST MOD 30 MIN: CPT | Performed by: OBSTETRICS & GYNECOLOGY

## 2019-09-26 PROCEDURE — 81025 URINE PREGNANCY TEST: CPT | Performed by: OBSTETRICS & GYNECOLOGY

## 2019-09-26 NOTE — PATIENT INSTRUCTIONS
Planning for Pregnancy   WHAT YOU NEED TO KNOW:   Why is it important to plan for pregnancy? There are things you can do to get your body ready for a healthy pregnancy  A healthy pregnancy can improve your chance of having a healthy baby  The steps you need to take and the amount of time needed depends on your current health and habits  Work with your healthcare provider to help you plan a healthy pregnancy  What do I need to know about nutrition and exercise before pregnancy? · Eat a variety of healthy foods  Healthy foods include fruits, vegetables, whole-grain breads, low-fat dairy foods, beans, lean meats, and fish  Limit foods high in sugar, fat, and sodium  Limit your intake of fish to 2 servings each week  Choose fish low in mercury such as canned light tuna, shrimp, salmon, cod, or tilapia  Do not  eat fish high in mercury such as swordfish, tilefish, timur mackerel, and shark  · Take 400 micrograms (mcg) of folic acid each day  This will help to prevent birth defects of the brain and spine such as spina bifida  Most women should take folic acid before pregnancy and up to 12 weeks after getting pregnant  · Exercise for at least 30 minutes, 5 days a week  Some examples of exercise include walking, biking, dancing, and swimming  Include muscle strengthening activities 2 days each week  Regular exercise provides many health benefits  It helps you manage your weight, and decreases your risk for type 2 diabetes, heart disease, stroke, and high blood pressure  Exercise can also help improve your mood  Ask your healthcare provider about the best exercise plan for you  How does weight affect pregnancy? · Obesity  can make it harder for you to get pregnant  It also increases your risk of health problems during pregnancy  Some of these health problems include gestational diabetes, high blood pressure, and infections  It can also increase your baby's risk of health problems such as birth defects   Your baby may also be large and harder to deliver or be born prematurely (early)  Your risk of miscarriage is also higher if you are obese  Work with your healthcare provider to reach a healthy weight before you try to get pregnant  · Being underweight  can also make it hard for you to get pregnant  It can also increase your risk of having a premature baby and miscarriage  Your baby may be born at a low birth weight  What do I need to know about smoking, alcohol, and drugs? · Smoking  increases your risk of a miscarriage and other health problems during pregnancy  Smoking can cause your baby to be born too early or weigh less at birth  Ask your healthcare provider for information if you need help quitting  · Alcohol  passes from your body to your baby through the placenta  It can affect your baby's brain development and cause fetal alcohol syndrome (FAS)  FAS is a group of conditions that causes mental, behavior, and growth problems  Talk to your healthcare provider if you abuse alcohol and need help quitting before pregnancy  · Drugs , such as marijuana and cocaine, should not be used while you are trying to get pregnant or during pregnancy  They increase your risk of problems during pregnancy and increase the risk of having a baby with health problems  These include birth defects, premature birth, and infant death  What do I need to know about medicines and supplements? Tell your healthcare provider about all the medicines and supplements you take  Certain medicines and supplements should not be used during pregnancy  These include over-the-counter medicines, prescription medicines, vitamins, and herbal supplements  He or she may recommend that you take different medicines that are safer during pregnancy  What do I need to know about immunizations? Tell your healthcare provider about all the immunizations you have had   If you have missed any immunizations, your healthcare provider may recommend that you update your immunizations  These include hepatitis B, influenza, MMR (measles, mumps, rubella), Tdap, and varicella immunizations  What tests may I need to have before pregnancy? Your healthcare provider may recommend that you have tests to screen for sexually transmitted infections  These include chlamydia, gonorrhea, herpes, HIV infection, syphilis, and tuberculosis  These infectious diseases should be treated before pregnancy, if needed  What do I need to know about toxic substances? Toxic substances can harm a developing baby  Examples include cleaning products, paints, solvents, pesticides, and other chemical products  They can increase the risk of having a miscarriage, premature birth, and low-birth weight baby  They also increase the risk of developmental delay and childhood cancer  Avoid exposure to toxic substances and materials at work and home  What do I need to know about genetic testing? Tell your healthcare provider about your and your partner's family history of genetic disorders, developmental delays, or other disabilities  Also tell your healthcare provider about any problems you have had in previous pregnancies  Your healthcare provider may recommend that you see a healthcare professional called a genetic counselor  He or she will talk to you about how genes, birth defects, and other medical conditions are passed down  He or she can also tell you about your risk for passing a genetic disease in a future pregnancy  How can I prepare for pregnancy if I have a medical condition? Medical conditions such as diabetes, high blood pressure, asthma, seizure disorders, and thyroid disorders should be managed before pregnancy  Mental health conditions, such as depression and anxiety, should also be treated  This will decrease your risk of having health problems during pregnancy  It will also decrease your baby's risk of medical problems   Medicines used to treat certain conditions are not safe to use during pregnancy and may need to be changed before you get pregnant  Ask your healthcare provider if it is safe for you to get pregnant if you have a medical condition  CARE AGREEMENT:   You have the right to help plan your care  Learn about your health condition and how it may be treated  Discuss treatment options with your caregivers to decide what care you want to receive  You always have the right to refuse treatment  The above information is an  only  It is not intended as medical advice for individual conditions or treatments  Talk to your doctor, nurse or pharmacist before following any medical regimen to see if it is safe and effective for you  © 2017 [facility] Information is for End User's use only and may not be sold, redistributed or otherwise used for commercial purposes  All illustrations and images included in CareNotes® are the copyrighted property of A D A M [employer]  or [employer]

## 2019-09-26 NOTE — PROGRESS NOTES
Assessment/Plan   Diagnoses and all orders for this visit:    Possible pregnancy  -     POCT urine HCG    Irregular menses  -     hCG, quantitative; Future  -     TSH, 3rd generation; Future  -     Prolactin; Future  -     Follicle stimulating hormone; Future    Encounter for preconception consultation     1  Irregular menses-patient had Mirena IUD pulled on 11/28/18  She had normal menses up until July, with last normal menses 7/10/19     She then had light spotting 8/7/19 lasting 2 days and 1 day of spotting on 9/6/19  These are still at monthly intervals and were accompanied by positive ovulation kit on 8/21/19 and 9/18/19 respectively  Urine pregnancy test today was negative  Patient was counseled about this  Likely, she has a change in the character of her menstrual flow but is still having menses, particularly given the positive ovulation kit  She is concerned about the changes  Laboratory testing with TSH, HCG, prolactin, and FSH were ordered and she will get these done  Would defer any kind of hormonal treatment at this time such as progesterone or Clomid or letrozole  2  Pre conceptual-patient was counseled about timed intercourse  She was encouraged to try to have sex every 1-2 days starting around day 10/11 and to continue this until around day 16  She should concentrate on intercourse during positive ovulation time cycles  She continues with prenatal vitamin with folic acid  Diet and exercise recommended  She was recommended to stay up-to-date with vaccinations  She denies any family history of birth defects or significant medical issues with her or her partner  She has been attempting since December 2018 after the IUD was removed  Should she not be pregnant by December 2019, she could consider returning for discussion regarding infertility evaluation  She was briefly counseled about lab work, HSG, pelvic ultrasound, and semen analysis as part of the evaluation    3  Other-patient had excellent response with Mirena IUD versus previous ParaGard  She will consider this for contraception going forward  Otherwise, she will follow-up 2020 for yearly exam or as needed  Subjective   Patient ID: Justina Rojo is a 26 y o  female  Vitals:    19 0705   BP: 128/82     Patient was seen today for follow-up visit  Please see assessment plan for details  The following portions of the patient's history were reviewed and updated as appropriate: allergies, current medications, past family history, past medical history, past social history, past surgical history and problem list   Past Medical History:   Diagnosis Date    Acute sinusitis     24 may 2017 resolved    Breastfeeding (infant)     Dysfunction of eustachian tube     24 may 2017    unspecified laterality    Elevated blood pressure reading     2016  resolved    Gallbladder pain     "sporadic"    Gave birth to child recently     2016    IUD (intrauterine device) in place     2016  resolved    Migraine      Past Surgical History:   Procedure Laterality Date     SECTION N/A 2016    Procedure:  SECTION ();   Surgeon: Lyla Infante MD;  Location: [facility];  Service:     CHOLECYSTECTOMY      laparoscopic    COLONOSCOPY      EAR SURGERY Bilateral     ear pressure equalization tube bilateral    GALLBLADDER SURGERY      INTRAUTERINE DEVICE INSERTION      MYRINGOTOMY W/ TUBES Left     [place] LAP,CHOLECYSTECTOMY/GRAPH N/A 11/3/2016    Procedure: LAPAROSCOPIC CHOLECYSTECTOMY WITH INTROPERATIVE CHOLANGIOGRAM; LYSIS OF ADHESIONS;  Surgeon: Dar Freedman MD;  Location: [facility];  Service: General    WISDOM TOOTH EXTRACTION       OB History    Para Term  AB Living   1 1 1 0 0 1   SAB TAB Ectopic Multiple Live Births   0 0 0 0 1      # Outcome Date GA Lbr Enio/2nd Weight Sex Delivery Anes PTL Lv   1 Term 16 39w0d  2994 g (6 lb 9 6 oz) F CS-LTranv Spinal N LORENZO       Current Outpatient Medications:     acetaminophen-codeine (TYLENOL #3) 300-30 mg per tablet, Take 1 tablet by mouth every 8 (eight) hours as needed (Headache), Disp: 30 tablet, Rfl: 0    Prenatal Vit-DSS-Fe Cbn-FA (PRENATAL AD PO), Take by mouth, Disp: , Rfl:   Allergies   Allergen Reactions    Cefuroxime GI Intolerance    Rizatriptan Nausea Only     Social History     Socioeconomic History    Marital status: /Civil Union     Spouse name: None    Number of children: 1    Years of education: None    Highest education level: None   Occupational History    None   Social Needs    Financial resource strain: None    Food insecurity:     Worry: None     Inability: None    Transportation needs:     Medical: None     Non-medical: None   Tobacco Use    Smoking status: Never Smoker    Smokeless tobacco: Never Used    Tobacco comment: no nd hand smoke exposure   Substance and Sexual Activity    Alcohol use: Yes     Comment: socially    Drug use: No    Sexual activity: Yes   Lifestyle    Physical activity:     Days per week: None     Minutes per session: None    Stress: None   Relationships    Social connections:     Talks on phone: None     Gets together: None     Attends [employer] service: None     Active member of club or organization: None     Attends meetings of clubs or organizations: None     Relationship status: None    Intimate partner violence:     Fear of current or ex partner: None     Emotionally abused: None     Physically abused: None     Forced sexual activity: None   Other Topics Concern    None   Social History Narrative    Caffeine use    No [employer] affiliation    Uses safety equipment seat belts     Family History   Problem Relation Age of Onset    Breast cancer Mother     Heart attack Father     Heart disease Father     Heart attack Paternal Grandfather     Heart disease Paternal Grandfather     Breast cancer Maternal Aunt        Review of Systems Constitutional: Negative for chills, diaphoresis, fatigue and fever  Respiratory: Negative for apnea, cough, chest tightness, shortness of breath and wheezing  Cardiovascular: Negative for chest pain, palpitations and leg swelling  Gastrointestinal: Negative for abdominal distention, abdominal pain, anal bleeding, constipation, diarrhea, nausea, rectal pain and vomiting  Genitourinary: Negative for difficulty urinating, dyspareunia, dysuria, frequency, hematuria, menstrual problem, pelvic pain, urgency, vaginal bleeding, vaginal discharge and vaginal pain  Musculoskeletal: Negative for arthralgias, back pain and myalgias  Skin: Negative for color change and rash  Neurological: Negative for dizziness, syncope, light-headedness, numbness and headaches  Hematological: Negative for adenopathy  Does not bruise/bleed easily  Psychiatric/Behavioral: Negative for dysphoric mood and sleep disturbance  The patient is not nervous/anxious  Objective   Physical Exam    Objective      /82 (BP Location: Left arm, Patient Position: Sitting, Cuff Size: Large)   Ht 5' 5" (1 651 m)   Wt 106 kg (234 lb)   LMP  (LMP Unknown)   BMI 38 94 kg/m²     General:   alert and oriented, in no acute distress   Neck: normal to inspection and palpation   Breast:    Heart:    Lungs:    Abdomen: Soft, nontender, without masses   Vulva: normal   Vagina: Without erythema or lesions or discharge  Normal   Cervix: Without lesions or discharge or cervicitis    No Cervical motion tenderness   Uterus: top normal size, anteverted, non-tender   Adnexa: no mass, fullness, tenderness   Rectum: deferred    Psych:  Normal mood and affect   Skin:  Without obvious lesions   Eyes: symmetric, with normal movements and reactivity   Musculoskeletal:  Normal muscle tone and movements appreciated

## 2019-09-28 ENCOUNTER — APPOINTMENT (OUTPATIENT)
Dept: LAB | Facility: MEDICAL CENTER | Age: 29
End: 2019-09-28
Payer: COMMERCIAL

## 2019-09-28 DIAGNOSIS — N92.6 IRREGULAR MENSES: ICD-10-CM

## 2019-09-28 LAB
B-HCG SERPL-ACNC: <2 MIU/ML
FSH SERPL-ACNC: 1.8 MIU/ML
PROLACTIN SERPL-MCNC: 11.2 NG/ML
TSH SERPL DL<=0.05 MIU/L-ACNC: 2.69 UIU/ML (ref 0.36–3.74)

## 2019-09-28 PROCEDURE — 36415 COLL VENOUS BLD VENIPUNCTURE: CPT

## 2019-09-28 PROCEDURE — 84702 CHORIONIC GONADOTROPIN TEST: CPT

## 2019-09-28 PROCEDURE — 84146 ASSAY OF PROLACTIN: CPT

## 2019-09-28 PROCEDURE — 84443 ASSAY THYROID STIM HORMONE: CPT

## 2019-09-28 PROCEDURE — 83001 ASSAY OF GONADOTROPIN (FSH): CPT

## 2019-10-01 ENCOUNTER — TELEPHONE (OUTPATIENT)
Dept: OBGYN CLINIC | Facility: CLINIC | Age: 29
End: 2019-10-01

## 2019-10-01 NOTE — TELEPHONE ENCOUNTER
----- Message from Mark Neves MD sent at 9/30/2019  7:49 AM EDT -----  Labs are all okay, please inform the patient

## 2019-12-12 ENCOUNTER — OFFICE VISIT (OUTPATIENT)
Dept: OBGYN CLINIC | Facility: CLINIC | Age: 29
End: 2019-12-12
Payer: COMMERCIAL

## 2019-12-12 VITALS
BODY MASS INDEX: 38.82 KG/M2 | SYSTOLIC BLOOD PRESSURE: 128 MMHG | WEIGHT: 233 LBS | HEIGHT: 65 IN | DIASTOLIC BLOOD PRESSURE: 82 MMHG

## 2019-12-12 DIAGNOSIS — Z36.9 ANTENATAL SCREENING ENCOUNTER: Primary | ICD-10-CM

## 2019-12-12 DIAGNOSIS — N91.2 AMENORRHEA: ICD-10-CM

## 2019-12-12 LAB — SL AMB POCT URINE HCG: POSITIVE

## 2019-12-12 PROCEDURE — 99213 OFFICE O/P EST LOW 20 MIN: CPT | Performed by: OBSTETRICS & GYNECOLOGY

## 2019-12-12 PROCEDURE — 81025 URINE PREGNANCY TEST: CPT | Performed by: OBSTETRICS & GYNECOLOGY

## 2019-12-12 NOTE — PATIENT INSTRUCTIONS
Pregnancy   WHAT YOU NEED TO KNOW:   What do I need to know about pregnancy? A normal pregnancy lasts about 40 weeks  The first trimester lasts from your last period through the 12th week of pregnancy  The second trimester lasts from the 13th week of your pregnancy through the 23rd week  The third trimester lasts from your 24th week of pregnancy until your baby is born  If you know the date of your last period, your healthcare provider can estimate your due date  You may give birth to your baby any time from 37 weeks to 2 weeks after your due date  What is prenatal care? Prenatal care is a series of visits with your healthcare provider throughout your pregnancy  Prenatal care can help prevent problems during pregnancy and childbirth  At each prenatal visit, your healthcare provider will weigh you and check your blood pressure  Your healthcare provider will also check your baby's heartbeat and growth  You may also need the following at some visits:  · A pelvic exam  allows your healthcare provider to see your cervix (the bottom part of your uterus)  Your healthcare provider uses a speculum to gently open your vagina  He will check the size and shape of your uterus  · Blood tests  may be done to check for gestational diabetes and anemia (low iron level)  You may need other blood tests, such as blood type, Rh factor, or tests to check for birth defects  · A fetal ultrasound  shows pictures of your baby inside your uterus  It shows your baby's development  The movement and position of your baby can also be seen  Your healthcare provider may be able to tell you what your baby's gender is during the ultrasound  What can I do to have a healthy pregnancy? · Eat a variety of healthy foods  Healthy foods include fruits, vegetables, whole-grain breads, low-fat dairy foods, beans, lean meats, and fish  Drink liquids as directed  Ask how much liquid to drink each day and which liquids are best for you   Limit caffeine to less than 200 milligrams each day  Limit your intake of fish to 2 servings each week  Choose fish low in mercury such as canned light tuna, shrimp, crab, salmon, cod, or tilapia  Do not  eat fish high in mercury such as swordfish, tilefish, timur mackerel, and shark  · Take prenatal vitamins as directed  Your need for certain vitamins and minerals, such as folic acid, increases during pregnancy  Prenatal vitamins provide some of the extra vitamins and minerals you need  Prenatal vitamins may also help to decrease the risk of certain birth defects  · Ask how much weight you should gain during your pregnancy  Too much or too little weight gain can be unhealthy for you and your baby  · Talk to your healthcare provider about exercise  Moderate exercise can help you stay fit  Your healthcare provider will help you plan an exercise program that is safe for you during pregnancy  · Do not smoke  If you smoke, it is never too late to quit  Smoking increases your risk of a miscarriage and other health problems during your pregnancy  Smoking can cause your baby to be born too early or weigh less at birth  Ask your healthcare provider for information if you need help quitting  · Do not drink alcohol  Alcohol passes from your body to your baby through the placenta  It can affect your baby's brain development and cause fetal alcohol syndrome (FAS)  FAS is a group of conditions that causes mental, behavior, and growth problems  · Talk to your healthcare provider before you take any medicines  Many medicines may harm your baby if you take them when you are pregnant  Do not take any medicines, vitamins, herbs, or supplements without first talking to your healthcare provider  Never use illegal or street drugs (such as marijuana or cocaine) while you are pregnant  What body changes may happen during my pregnancy?    · Breast changes  you will experience include tenderness and tingling during the early part of your pregnancy  Your breasts will become larger  You may need to use a support bra  You may see a thin, yellow fluid, called colostrum, leak from your nipples during the second trimester  Colostrum is a liquid that changes to milk about 3 days after you give birth  · Skin changes and stretch marks  may occur during your pregnancy  You may have red marks, called stretch marks, on your skin  Stretch marks will usually fade after pregnancy  Use lotion if your skin is dry and itchy  The skin on your face, around your nipples, and below your belly button may darken  Most of the time, your skin will return to its normal color after your baby is born  · Morning sickness  is nausea and vomiting that can happen at any time of day  Avoid fatty and spicy foods  Eat small meals throughout the day instead of large meals  Nevaeh may help to decrease nausea  Ask your healthcare provider about other ways of decreasing nausea and vomiting  · Heartburn  may be caused by changes in your hormones during pregnancy  Your growing uterus may also push your stomach upward and force stomach acid to back up into your esophagus  Eat 4 or 5 small meals each day instead of large meals  Avoid spicy foods  Avoid eating right before bedtime  · Constipation  may develop during your pregnancy  To treat constipation, eat foods high in fiber such as fiber cereals, beans, fruits, vegetables, whole-grain breads, and prune juice  Get regular exercise and drink plenty of water  Your healthcare provider may also suggest a fiber supplement to soften your bowel movements  Talk to your healthcare provider before you use any medicines to decrease constipation  · Hemorrhoids  are enlarged veins in the rectal area  They may cause pain, itching, and bright red bleeding from your rectum  To decrease your risk of hemorrhoids, prevent constipation and do not strain to have a bowel movement   If you have hemorrhoids, soak in a tub of warm water to ease discomfort  Ask your healthcare provider how you can treat hemorrhoids  · Leg cramps and swelling  may be caused by low calcium levels or the added weight of pregnancy  Raise your legs above the level of your heart to decrease swelling  During a leg cramp, stretch or massage the muscle that has the cramp  Heat may help decrease pain and muscle spasms  Apply heat on your muscle for 20 to 30 minutes every 2 hours for as many days as directed  · Back pain  may occur as your baby grows  Do not stand for long periods of time or lift heavy items  Use good posture while you stand, squat, or bend  Wear low-heeled shoes with good support  Rest may also help to relieve back pain  Ask your healthcare provider about exercises you can do to strengthen your back muscles  What are some safety tips during pregnancy? · Avoid hot tubs and saunas  Do not use a hot tub or sauna while you are pregnant, especially during your first trimester  Hot tubs and saunas may raise your baby's temperature and increase the risk of birth defects  · Avoid toxoplasmosis  This is an infection caused by eating raw meat or being around infected cat feces  It can cause birth defects, miscarriages, and other problems  Wash your hands after you touch raw meat  Make sure any meat is well-cooked before you eat it  Avoid raw eggs and unpasteurized milk  Use gloves or ask someone else to clean your cat's litter box while you are pregnant  · Ask your healthcare provider about travel  The most comfortable time to travel is during the second trimester  Ask your healthcare provider if you can travel after 36 weeks  You may not be able to travel in an airplane after 36 weeks  He may also recommend that you avoid long road trips  When should I seek immediate care? · You develop a severe headache that does not go away  · You have new or increased vision changes, such as blurred or spotted vision      · You have new or increased swelling in your face or hands  · You have pain or cramping in your abdomen or low back  · You have vaginal bleeding  When should I contact my healthcare provider? · You have abdominal cramps, pressure, or tightening  · You have a change in vaginal discharge  · You cannot keep food or drinks down, and you are losing weight  · You have chills or a fever  · You have vaginal itching, burning, or pain  · You have yellow, green, white, or foul-smelling vaginal discharge  · You have pain or burning when you urinate, less urine than usual, or pink or bloody urine  · You have questions or concerns about your condition or care  CARE AGREEMENT:   You have the right to help plan your care  Learn about your health condition and how it may be treated  Discuss treatment options with your caregivers to decide what care you want to receive  You always have the right to refuse treatment  The above information is an  only  It is not intended as medical advice for individual conditions or treatments  Talk to your doctor, nurse or pharmacist before following any medical regimen to see if it is safe and effective for you  © 2017 [employer]  Information is for End User's use only and may not be sold, redistributed or otherwise used for commercial purposes  All illustrations and images included in CareNotes® are the copyrighted property of [employer]  or [employer]in [employer]erg[employer]

## 2019-12-12 NOTE — PROGRESS NOTES
Assessment/Plan   Diagnoses and all orders for this visit:     screening encounter  -     Prenatal Panel; Future    Amenorrhea  -     POCT urine HCG  -     hCG, quantitative; Future     1  Pregnancy confirmation visit-patient with LMP 19  She had positive urine pregnancy test 19 which was confirmed today  She denies any concerns  We will check quantitative HCG as she is surprised by lack of any symptoms of pregnancy  She denies any history of birth defects  She is up-to-date with vaccination, had flu vaccine 2019  She will continue vitamins with folic acid  Prenatal lab sheet was given  Medications in pregnancy and nutrition in pregnancy she were reviewed  She will follow-up in about 3 weeks time for ultrasound and 1st prenatal visit with Dr Bunny Morales RN/cc visit  2  History of previous -done 16 in early labor for recurrent fetal decelerations  She declines trial of labor and wishes to have repeat   3  History of irregular menses-none noted recently  4  Other-patient had Mirena IUD and ParaGard IUD previously  She was much happy with Mirena and would consider it going forward after delivery of the child  Follow-up 3 weeks for ultrasound and 1st prenatal visit    Subjective   Patient ID: Martita Bae is a 26 y o  female  Vitals:    19 1042   BP: 128/82     Patient seen today for pregnancy confirmation visit  Please see assessment plan for details        The following portions of the patient's history were reviewed and updated as appropriate: allergies, current medications, past family history, past medical history, past social history, past surgical history and problem list   Past Medical History:   Diagnosis Date    Acute sinusitis     24 may 2017 resolved    Breastfeeding (infant)     Dysfunction of eustachian tube     24 may 2017    unspecified laterality    Elevated blood pressure reading     2016  resolved    Gallbladder pain     "sporadic"    Gave birth to child recently     2016    IUD (intrauterine device) in place     2016  resolved    Migraine      Past Surgical History:   Procedure Laterality Date     SECTION N/A 2016    Procedure:  SECTION ();   Surgeon: Deshaun Campbell MD;  [facility];  Service:     CHOLECYSTECTOMY      laparoscopic    COLONOSCOPY      EAR SURGERY Bilateral     ear pressure equalization tube bilateral    GALLBLADDER SURGERY      INTRAUTERINE DEVICE INSERTION      MYRINGOTOMY W/ TUBES Left     [place] LAP,CHOLECYSTECTOMY/GRAPH N/A 11/3/2016    Procedure: LAPAROSCOPIC CHOLECYSTECTOMY WITH INTROPERATIVE CHOLANGIOGRAM; LYSIS OF ADHESIONS;  Surgeon: Aminata Pollack MD;  Location: [facility];  Service: General    WISDOM TOOTH EXTRACTION       OB History    Para Term  AB Living   1 1 1 0 0 1   SAB TAB Ectopic Multiple Live Births   0 0 0 0 1      # Outcome Date GA Lbr Enio/2nd Weight Sex Delivery Anes PTL Lv   1 Term 16 39w0d  2994 g (6 lb 9 6 oz) F CS-LTranv Spinal N LORENZO       Current Outpatient Medications:     acetaminophen-codeine (TYLENOL #3) 300-30 mg per tablet, Take 1 tablet by mouth every 8 (eight) hours as needed (Headache), Disp: 30 tablet, Rfl: 0    Prenatal Vit-DSS-Fe Cbn-FA (PRENATAL AD PO), Take by mouth, Disp: , Rfl:   Allergies   Allergen Reactions    Cefuroxime GI Intolerance    Rizatriptan Nausea Only     Social History     Socioeconomic History    Marital status: /Civil Union     Spouse name: None    Number of children: 1    Years of education: None    Highest education level: None   Occupational History    None   Social Needs    Financial resource strain: None    Food insecurity:     Worry: None     Inability: None    Transportation needs:     Medical: None     Non-medical: None   Tobacco Use    Smoking status: Never Smoker    Smokeless tobacco: Never Used    Tobacco comment: no nd hand smoke exposure   Substance and Sexual Activity    Alcohol use: Yes     Comment: socially    Drug use: No    Sexual activity: Yes   Lifestyle    Physical activity:     Days per week: None     Minutes per session: None    Stress: None   Relationships    Social connections:     Talks on phone: None     Gets together: None     Attends [employer]arsani service: None     Active member of club or organization: None     Attends meetings of clubs or organizations: None     Relationship status: None    Intimate partner violence:     Fear of current or ex partner: None     Emotionally abused: None     Physically abused: None     Forced sexual activity: None   Other Topics Concern    None   Social History Narrative    Caffeine use    No [employer]arsani affiliation    Uses safety equipment seat belts     Family History   Problem Relation Age of Onset    Breast cancer Mother     Heart attack Father     Heart disease Father     Heart attack Paternal Grandfather     Heart disease Paternal Grandfather     Breast cancer Maternal Aunt        Review of Systems   Constitutional: Negative for chills, diaphoresis, fatigue and fever  Respiratory: Negative for apnea, cough, chest tightness, shortness of breath and wheezing  Cardiovascular: Negative for chest pain, palpitations and leg swelling  Gastrointestinal: Negative for abdominal distention, abdominal pain, anal bleeding, constipation, diarrhea, nausea, rectal pain and vomiting  Genitourinary: Negative for difficulty urinating, dyspareunia, dysuria, frequency, hematuria, menstrual problem, pelvic pain, urgency, vaginal bleeding, vaginal discharge and vaginal pain  Musculoskeletal: Negative for arthralgias, back pain and myalgias  Skin: Negative for color change and rash  Neurological: Negative for dizziness, syncope, light-headedness, numbness and headaches  Hematological: Negative for adenopathy  Does not bruise/bleed easily     Psychiatric/Behavioral: Negative for dysphoric mood and sleep disturbance  The patient is not nervous/anxious          Objective   Physical Exam    Objective      /82 (BP Location: Left arm, Patient Position: Sitting, Cuff Size: Standard)   Ht 5' 5" (1 651 m)   Wt 106 kg (233 lb)   LMP 11/02/2019   BMI 38 77 kg/m²     General:   alert and oriented, in no acute distress   Neck:    Breast:    Heart:    Lungs:    Abdomen: Nontender   Vulva:    Vagina:    Cervix:    Uterus:    Adnexa:    Rectum:     Psych:  Normal mood and affect   Skin:  Without obvious lesions   Eyes: symmetric, with normal movements and reactivity   Musculoskeletal:  Normal muscle tone and movements appreciated

## 2019-12-14 ENCOUNTER — APPOINTMENT (OUTPATIENT)
Dept: LAB | Facility: MEDICAL CENTER | Age: 29
End: 2019-12-14
Payer: COMMERCIAL

## 2019-12-14 DIAGNOSIS — Z36.9 ANTENATAL SCREENING ENCOUNTER: ICD-10-CM

## 2019-12-14 DIAGNOSIS — N91.2 AMENORRHEA: ICD-10-CM

## 2019-12-14 LAB
ABO GROUP BLD: NORMAL
B-HCG SERPL-ACNC: ABNORMAL MIU/ML
BASOPHILS # BLD AUTO: 0.05 THOUSANDS/ΜL (ref 0–0.1)
BASOPHILS NFR BLD AUTO: 1 % (ref 0–1)
BILIRUB UR QL STRIP: NEGATIVE
BLD GP AB SCN SERPL QL: NEGATIVE
CLARITY UR: CLEAR
COLOR UR: YELLOW
EOSINOPHIL # BLD AUTO: 0.03 THOUSAND/ΜL (ref 0–0.61)
EOSINOPHIL NFR BLD AUTO: 0 % (ref 0–6)
ERYTHROCYTE [DISTWIDTH] IN BLOOD BY AUTOMATED COUNT: 14.5 % (ref 11.6–15.1)
GLUCOSE UR STRIP-MCNC: NEGATIVE MG/DL
HBV SURFACE AG SER QL: NORMAL
HCT VFR BLD AUTO: 43.2 % (ref 34.8–46.1)
HGB BLD-MCNC: 13.6 G/DL (ref 11.5–15.4)
HGB UR QL STRIP.AUTO: NEGATIVE
IMM GRANULOCYTES # BLD AUTO: 0.02 THOUSAND/UL (ref 0–0.2)
IMM GRANULOCYTES NFR BLD AUTO: 0 % (ref 0–2)
KETONES UR STRIP-MCNC: NEGATIVE MG/DL
LEUKOCYTE ESTERASE UR QL STRIP: NEGATIVE
LYMPHOCYTES # BLD AUTO: 2.32 THOUSANDS/ΜL (ref 0.6–4.47)
LYMPHOCYTES NFR BLD AUTO: 31 % (ref 14–44)
MCH RBC QN AUTO: 27.6 PG (ref 26.8–34.3)
MCHC RBC AUTO-ENTMCNC: 31.5 G/DL (ref 31.4–37.4)
MCV RBC AUTO: 88 FL (ref 82–98)
MONOCYTES # BLD AUTO: 0.53 THOUSAND/ΜL (ref 0.17–1.22)
MONOCYTES NFR BLD AUTO: 7 % (ref 4–12)
NEUTROPHILS # BLD AUTO: 4.44 THOUSANDS/ΜL (ref 1.85–7.62)
NEUTS SEG NFR BLD AUTO: 61 % (ref 43–75)
NITRITE UR QL STRIP: NEGATIVE
NRBC BLD AUTO-RTO: 0 /100 WBCS
PH UR STRIP.AUTO: 7 [PH]
PLATELET # BLD AUTO: 318 THOUSANDS/UL (ref 149–390)
PMV BLD AUTO: 10.8 FL (ref 8.9–12.7)
PROT UR STRIP-MCNC: NEGATIVE MG/DL
RBC # BLD AUTO: 4.92 MILLION/UL (ref 3.81–5.12)
RH BLD: POSITIVE
RUBV IGG SERPL IA-ACNC: 63.7 IU/ML
SP GR UR STRIP.AUTO: 1.02 (ref 1–1.03)
SPECIMEN EXPIRATION DATE: NORMAL
UROBILINOGEN UR QL STRIP.AUTO: 0.2 E.U./DL
WBC # BLD AUTO: 7.39 THOUSAND/UL (ref 4.31–10.16)

## 2019-12-14 PROCEDURE — 87086 URINE CULTURE/COLONY COUNT: CPT

## 2019-12-14 PROCEDURE — 80081 OBSTETRIC PANEL INC HIV TSTG: CPT

## 2019-12-14 PROCEDURE — 84702 CHORIONIC GONADOTROPIN TEST: CPT

## 2019-12-14 PROCEDURE — 81003 URINALYSIS AUTO W/O SCOPE: CPT

## 2019-12-14 PROCEDURE — 36415 COLL VENOUS BLD VENIPUNCTURE: CPT

## 2019-12-15 LAB — BACTERIA UR CULT: NORMAL

## 2019-12-16 LAB
HIV 1+2 AB+HIV1 P24 AG SERPL QL IA: NORMAL
RPR SER QL: NORMAL

## 2019-12-17 ENCOUNTER — TELEPHONE (OUTPATIENT)
Dept: OBGYN CLINIC | Facility: CLINIC | Age: 29
End: 2019-12-17

## 2019-12-23 ENCOUNTER — TELEPHONE (OUTPATIENT)
Dept: OBGYN CLINIC | Facility: CLINIC | Age: 29
End: 2019-12-23

## 2019-12-23 NOTE — TELEPHONE ENCOUNTER
Patient called with pain up under her ribs, slightly more to the right but also some on the left  This started last night but has continued today  She has no cramping or spotting and no pelvic pain/pressure  She is 7wks pregnant  She has no constipation  I reviewed medications safe to take in pregnancy for indigestion/gas  She will try this  If she has no improvement or has worsening, she will call her family doctor  She will call us if she has any cramping or bleeding   She has her first prenatal appointments on 1/2/20

## 2020-01-02 ENCOUNTER — INITIAL PRENATAL (OUTPATIENT)
Dept: OBGYN CLINIC | Facility: CLINIC | Age: 30
End: 2020-01-02
Payer: COMMERCIAL

## 2020-01-02 ENCOUNTER — ULTRASOUND (OUTPATIENT)
Dept: OBGYN CLINIC | Facility: CLINIC | Age: 30
End: 2020-01-02
Payer: COMMERCIAL

## 2020-01-02 ENCOUNTER — TELEPHONE (OUTPATIENT)
Dept: PERINATAL CARE | Facility: CLINIC | Age: 30
End: 2020-01-02

## 2020-01-02 VITALS
WEIGHT: 236 LBS | HEIGHT: 65 IN | SYSTOLIC BLOOD PRESSURE: 122 MMHG | BODY MASS INDEX: 39.32 KG/M2 | HEART RATE: 96 BPM | DIASTOLIC BLOOD PRESSURE: 84 MMHG

## 2020-01-02 DIAGNOSIS — Z36.9 ANTENATAL SCREENING ENCOUNTER: Primary | ICD-10-CM

## 2020-01-02 DIAGNOSIS — Z34.81 PRENATAL CARE, SUBSEQUENT PREGNANCY, FIRST TRIMESTER: ICD-10-CM

## 2020-01-02 DIAGNOSIS — Z11.3 SCREENING FOR STDS (SEXUALLY TRANSMITTED DISEASES): Primary | ICD-10-CM

## 2020-01-02 DIAGNOSIS — Z3A.08 8 WEEKS GESTATION OF PREGNANCY: ICD-10-CM

## 2020-01-02 PROCEDURE — 99214 OFFICE O/P EST MOD 30 MIN: CPT | Performed by: OBSTETRICS & GYNECOLOGY

## 2020-01-02 PROCEDURE — 76817 TRANSVAGINAL US OBSTETRIC: CPT | Performed by: OBSTETRICS & GYNECOLOGY

## 2020-01-02 PROCEDURE — OBC: Performed by: OBSTETRICS & GYNECOLOGY

## 2020-01-02 PROCEDURE — 87591 N.GONORRHOEAE DNA AMP PROB: CPT | Performed by: OBSTETRICS & GYNECOLOGY

## 2020-01-02 PROCEDURE — PNV: Performed by: OBSTETRICS & GYNECOLOGY

## 2020-01-02 PROCEDURE — 87491 CHLMYD TRACH DNA AMP PROBE: CPT | Performed by: OBSTETRICS & GYNECOLOGY

## 2020-01-02 NOTE — PROGRESS NOTES
OB INTAKE INTERVIEW  * Pt presents for OB intake  * Accompanied by: , Ken Upton  *  *Hx of  delivery prior to 36 weeks 6 days NO  *Patient's last menstrual period was 2019  *Ultrasound date:  2020   9weeks 0days  *Estimated date of delivery: 2020   * confirmed by LMP    *Signs/Symptoms of Pregnancy   *constipation No   *headaches Yes   *cramping/spotting No   *PICA cravings No  *Diabetes- if you answer yes, please order 1 hour GTT, 50g   *hx of GDM No   *BMI >35 Yes   *first degree relative with type 2 diabetes No   *hx of PCOS No   *current metformin use No   *prior hx of LGA/macrosomia No   *AMA with other risk factors No  *Hypertension- if you answer yes, please order preeclampsia labs including 24 hour urine protein   *Hx of chronic HTN No   *hx of gestational HTN No   *hx of preeclampsia, eclampsia, or HELLP syndrome No  *Infection Screening-    *does the pt have a hx of MRSA? No   *if yes- please follow MRSA protocol and obtain a nasal swab for MRSA culture   *history of herpes? No  *Immunizations:   *influenza vaccine given 10/2019   *discussed Tdap vaccine    *Interview education   *Handouts given:    *Baby and Me phone denys guide    *Baby and Me support center    *Don[facility] Barron sign up instructions    *Lab Locations    *[facility]    *[facility] CLOSED discussed    *Pre-Birth Registration encouraged    *Pregnancy Warning Signs reviewed    *Safe Medications During Pregnancy    *Discussed toxoplasmosis    *[facility]     *discussed genetic testing- pt interested No      *appointment at [facility] made No-patient will call to schedule Level II-[facility] packet given-referral placed      *I have these concerns about this prenatal patient: None  *Details that I feel the provider should be aware of: None    PN1 visit scheduled  The patient was oriented to our practice and all questions were answered      Interviewed by:  Genesis Coombs DAKOTAMS/[facility]

## 2020-01-02 NOTE — PROGRESS NOTES
Patient seen today for 1st prenatal visit  She is doing well without complaints  1  8 5 weeks-1st prenatal visit done  Prenatal labs were previously done an okay  GC/chlamydia was done today  Patient is status post flu vaccine in 2019  She declines genetic testing  She will follow-up in 4 weeks time for prenatal visit  2  Previous -done for fetal decelerations  Patient declines trial of labor    Would plan for  at 39-40 weeks, 20 through 20

## 2020-01-04 LAB
C TRACH DNA SPEC QL NAA+PROBE: NEGATIVE
N GONORRHOEA DNA SPEC QL NAA+PROBE: NEGATIVE

## 2020-01-29 ENCOUNTER — OFFICE VISIT (OUTPATIENT)
Dept: FAMILY MEDICINE CLINIC | Facility: CLINIC | Age: 30
End: 2020-01-29
Payer: COMMERCIAL

## 2020-01-29 VITALS
HEIGHT: 65 IN | WEIGHT: 242.6 LBS | RESPIRATION RATE: 18 BRPM | DIASTOLIC BLOOD PRESSURE: 72 MMHG | HEART RATE: 106 BPM | TEMPERATURE: 98.2 F | SYSTOLIC BLOOD PRESSURE: 114 MMHG | BODY MASS INDEX: 40.42 KG/M2

## 2020-01-29 DIAGNOSIS — Z3A.08 8 WEEKS GESTATION OF PREGNANCY: ICD-10-CM

## 2020-01-29 DIAGNOSIS — H66.001 ACUTE SUPPURATIVE OTITIS MEDIA OF RIGHT EAR WITHOUT SPONTANEOUS RUPTURE OF TYMPANIC MEMBRANE, RECURRENCE NOT SPECIFIED: ICD-10-CM

## 2020-01-29 DIAGNOSIS — E66.01 OBESITY, MORBID, BMI 40.0-49.9 (HCC): ICD-10-CM

## 2020-01-29 DIAGNOSIS — J01.40 ACUTE PANSINUSITIS, RECURRENCE NOT SPECIFIED: Primary | ICD-10-CM

## 2020-01-29 DIAGNOSIS — J30.9 ALLERGIC RHINITIS, UNSPECIFIED SEASONALITY, UNSPECIFIED TRIGGER: ICD-10-CM

## 2020-01-29 DIAGNOSIS — R11.2 NAUSEA AND VOMITING, INTRACTABILITY OF VOMITING NOT SPECIFIED, UNSPECIFIED VOMITING TYPE: ICD-10-CM

## 2020-01-29 DIAGNOSIS — H69.80 DYSFUNCTION OF EUSTACHIAN TUBE, UNSPECIFIED LATERALITY: ICD-10-CM

## 2020-01-29 PROBLEM — H69.90 EUSTACHIAN TUBE DYSFUNCTION: Status: ACTIVE | Noted: 2020-01-29

## 2020-01-29 PROCEDURE — 1036F TOBACCO NON-USER: CPT | Performed by: FAMILY MEDICINE

## 2020-01-29 PROCEDURE — 3008F BODY MASS INDEX DOCD: CPT | Performed by: FAMILY MEDICINE

## 2020-01-29 PROCEDURE — 99214 OFFICE O/P EST MOD 30 MIN: CPT | Performed by: FAMILY MEDICINE

## 2020-01-29 RX ORDER — AMOXICILLIN 500 MG/1
500 CAPSULE ORAL EVERY 8 HOURS SCHEDULED
Qty: 21 CAPSULE | Refills: 0 | Status: SHIPPED | OUTPATIENT
Start: 2020-01-29 | End: 2020-02-05

## 2020-01-29 NOTE — PROGRESS NOTES
Assessment and Plan:  1  Acute sinusitis, amoxicillin is ordered category B drug  2  Otitis media, amoxicillin as above  3  Allergic rhinitis, Rhinocort ordered, category B medication  4  Eustachian tube dysfunction, as above  5  Nausea vomiting, resolved at the present time  If reoccurs patient may use clear liquids for 24 hours especially half-strength Gatorade or Pedialyte  Patient use Emetrol over-the-counter  If this is ineffective she should contact her OBGYN  6  Pregnancy, patient follows with her OBGYN  7  BMI of 40 37 diet exercise weight loss recommended  8  Patient to return in 1 week if still symptoms        Problem List Items Addressed This Visit        Respiratory    Allergic rhinitis     Rhinocort ordered, category B drug         Relevant Medications    budesonide (RINOCORT AQUA) 32 MCG/ACT nasal spray       Nervous and Auditory    Eustachian tube dysfunction     As above         Relevant Medications    budesonide (RINOCORT AQUA) 32 MCG/ACT nasal spray       Other    8 weeks gestation of pregnancy     Follow-up with OBGYN         Obesity, morbid, BMI 40 0-49 9 ([facility])     BMI 40 37 discussed diet exercise weight loss recommended           Other Visit Diagnoses     Acute pansinusitis, recurrence not specified    -  Primary    Relevant Medications    amoxicillin (AMOXIL) 500 mg capsule    Acute suppurative otitis media of right ear without spontaneous rupture of tympanic membrane, recurrence not specified        Relevant Medications    amoxicillin (AMOXIL) 500 mg capsule    Nausea and vomiting, intractability of vomiting not specified, unspecified vomiting type                     Diagnoses and all orders for this visit:    Acute pansinusitis, recurrence not specified  -     amoxicillin (AMOXIL) 500 mg capsule;  Take 1 capsule (500 mg total) by mouth every 8 (eight) hours for 7 days    Acute suppurative otitis media of right ear without spontaneous rupture of tympanic membrane, recurrence not specified  -     amoxicillin (AMOXIL) 500 mg capsule; Take 1 capsule (500 mg total) by mouth every 8 (eight) hours for 7 days    Allergic rhinitis, unspecified seasonality, unspecified trigger  -     budesonide (RINOCORT AQUA) 32 MCG/ACT nasal spray; 1 spray into each nostril daily    Dysfunction of Eustachian tube, unspecified laterality  -     budesonide (RINOCORT AQUA) 32 MCG/ACT nasal spray; 1 spray into each nostril daily    Obesity, morbid, BMI 40 0-49 9 ([facility])    8 weeks gestation of pregnancy    Nausea and vomiting, intractability of vomiting not specified, unspecified vomiting type              Subjective:      Patient ID: Adal Zaidi is a 26 y o  female  CC:    Chief Complaint   Patient presents with    Headache    Sore Throat    Nasal Congestion       HPI:    Patient is approximately 8 weeks pregnant  Is seeing OBGYN  On Sunday to Monday patient did have nausea vomiting loose stools this has resolved  Patient now has sinus pain and pressure left otalgia sore throat postnasal drip no fever chills no medication has been taken  The following portions of the patient's history were reviewed and updated as appropriate: allergies, current medications, past family history, past medical history, past social history, past surgical history and problem list       Review of Systems   Constitutional:        HPI   HENT:        HPI   Eyes: Negative  Respiratory: Negative  Cardiovascular: Negative  Gastrointestinal:        HPI   Endocrine: Negative  Genitourinary:        HPI   Musculoskeletal: Negative  Skin: Negative  Allergic/Immunologic: Negative  Neurological: Negative  Hematological: Negative  Psychiatric/Behavioral: Negative            Data to review:       Objective:    Vitals:    01/29/20 1300   BP: 114/72   BP Location: Left arm   Patient Position: Sitting   Cuff Size: Adult   Pulse: (!) 106   Resp: 18   Temp: 98 2 °F (36 8 °C)   TempSrc: Temporal   Weight: 110 kg (242 lb 9 6 oz)   Height: 5' 5" (1 651 m)        Physical Exam   Constitutional: She is oriented to person, place, and time  She appears well-developed and well-nourished  HENT:   Head: Normocephalic and atraumatic  Mouth/Throat: No oropharyngeal exudate  Both tympanic membranes are dull right TM is injected positive allergic turbinates positive pansinus tenderness to percussion positive purulent postnasal drip minimal pharyngeal injection negative exudate   Eyes: Pupils are equal, round, and reactive to light  Conjunctivae and EOM are normal  No scleral icterus  Neck: Neck supple  Cardiovascular: Normal rate, regular rhythm and normal heart sounds  Pulmonary/Chest: Effort normal and breath sounds normal    Abdominal: Soft  Bowel sounds are normal  There is no tenderness  Musculoskeletal: She exhibits no edema  Lymphadenopathy:     She has cervical adenopathy  Neurological: She is oriented to person, place, and time  No cranial nerve deficit  Skin: Skin is warm  Psychiatric: She has a normal mood and affect

## 2020-01-30 ENCOUNTER — ROUTINE PRENATAL (OUTPATIENT)
Dept: OBGYN CLINIC | Facility: CLINIC | Age: 30
End: 2020-01-30
Payer: COMMERCIAL

## 2020-01-30 VITALS
DIASTOLIC BLOOD PRESSURE: 80 MMHG | HEIGHT: 65 IN | HEART RATE: 98 BPM | WEIGHT: 238 LBS | SYSTOLIC BLOOD PRESSURE: 128 MMHG | BODY MASS INDEX: 39.65 KG/M2

## 2020-01-30 DIAGNOSIS — Z3A.12 12 WEEKS GESTATION OF PREGNANCY: Primary | ICD-10-CM

## 2020-01-30 LAB
SL AMB  POCT GLUCOSE, UA: NORMAL
SL AMB POCT URINE PROTEIN: NORMAL

## 2020-01-30 PROCEDURE — 81002 URINALYSIS NONAUTO W/O SCOPE: CPT | Performed by: OBSTETRICS & GYNECOLOGY

## 2020-01-30 PROCEDURE — PNV: Performed by: OBSTETRICS & GYNECOLOGY

## 2020-01-30 NOTE — PROGRESS NOTES
Patient was seen today for prenatal visit  She denies any complaints  1  12 5 weeks-doing well  She denies any issues  She is status post flu vaccine  She declines any genetic testing  To follow-up in 4 weeks time prenatal visit  2  Prior -done for fetal decelerations  Again, declines trial of labor, plan for  at 39-40 weeks

## 2020-01-30 NOTE — PATIENT INSTRUCTIONS
Pregnancy at 11 to [address]:   What changes are happening in my body? You are now at the end of your first trimester and entering your second trimester  Morning sickness usually goes away by this time  You may have other symptoms such as fatigue, frequent urination, and headaches  You may have gained between 2 to 4 pounds by now  How do I care for myself at this stage of my pregnancy? · Get plenty of rest   You may feel more tired than normal  You may need to take naps or go to bed earlier  · Manage nausea and vomiting  Avoid fatty and spicy foods  Eat small meals throughout the day instead of large meals  Nevaeh may help to decrease nausea  Ask your healthcare provider about other ways of decreasing nausea and vomiting  · Eat a variety of healthy foods  Healthy foods include fruits, vegetables, whole-grain breads, low-fat dairy foods, beans, lean meats, and fish  Drink liquids as directed  Ask how much liquid to drink each day and which liquids are best for you  Limit caffeine to less than 200 milligrams each day  Limit your intake of fish to 2 servings each week  Choose fish low in mercury such as canned light tuna, shrimp, salmon, cod, or tilapia  Do not  eat fish high in mercury such as swordfish, tilefish, timur mackerel, and shark  · Take prenatal vitamins as directed  Your need for certain vitamins and minerals, such as folic acid, increases during pregnancy  Prenatal vitamins provide some of the extra vitamins and minerals you need  Prenatal vitamins may also help to decrease the risk of certain birth defects  · Do not smoke  If you smoke, it is never too late to quit  Smoking increases your risk of a miscarriage and other health problems during your pregnancy  Smoking can cause your baby to be born too early or weigh less at birth  Ask your healthcare provider for information if you need help quitting  · Do not drink alcohol    Alcohol passes from your body to your baby through the placenta  It can affect your baby's brain development and cause fetal alcohol syndrome (FAS)  FAS is a group of conditions that causes mental, behavior, and growth problems  · Talk to your healthcare provider before you take any medicines  Many medicines may harm your baby if you take them when you are pregnant  Do not take any medicines, vitamins, herbs, or supplements without first talking to your healthcare provider  Never use illegal or street drugs (such as marijuana or cocaine) while you are pregnant  What are some safety tips during pregnancy? · Avoid hot tubs and saunas  Do not use a hot tub or sauna while you are pregnant, especially during your first trimester  Hot tubs and saunas may raise your baby's temperature and increase the risk of birth defects  · Avoid toxoplasmosis  This is an infection caused by eating raw meat or being around infected cat feces  It can cause birth defects, miscarriages, and other problems  Wash your hands after you touch raw meat  Make sure any meat is well-cooked before you eat it  Avoid raw eggs and unpasteurized milk  Use gloves or ask someone else to clean your cat's litter box while you are pregnant  What changes are happening with my baby? Your baby has fully formed fingernails and toenails  Your baby's heartbeat can now be heard  Ask your healthcare provider if you can listen to your baby's heartbeat  By week 14, your baby is over 4 inches long from the top of the head to the rump (baby's bottom)  Your baby weighs over 3 ounces  What do I need to know about prenatal care? During the first 28 weeks of your pregnancy, you will see your healthcare provider once a month  Prenatal care can help prevent problems during pregnancy and childbirth  Your healthcare provider will check your blood pressure and weight  You may also need any of the following:  · A urine test  may also be done to check for sugar and protein   These can be signs of gestational diabetes or infection  · Genetic disorders screening tests  may be offered to you  This screening test checks your baby's risk of genetic disorders such as Down syndrome  The screening test includes a blood test and ultrasound  · Your baby's heart rate  will be checked  When should I seek immediate care? · You have pain or cramping in your abdomen or low back  · You have heavy vaginal bleeding or clotting  · You pass material that looks like tissue or large clots  Collect the material and bring it with you  When should I contact my healthcare provider? · You cannot keep food or drinks down, and you are losing weight  · You have light bleeding  · You have chills or a fever  · You have vaginal itching, burning, or pain  · You have yellow, green, white, or foul-smelling vaginal discharge  · You have pain or burning when you urinate, less urine than usual, or pink or bloody urine  · You have questions or concerns about your condition or care  CARE AGREEMENT:   You have the right to help plan your care  Learn about your health condition and how it may be treated  Discuss treatment options with your caregivers to decide what care you want to receive  You always have the right to refuse treatment  The above information is an  only  It is not intended as medical advice for individual conditions or treatments  Talk to your doctor, nurse or pharmacist before following any medical regimen to see if it is safe and effective for you  © 2017 [employer] Information is for End User's use only and may not be sold, redistributed or otherwise used for commercial purposes  All illustrations and images included in CareNotes® are the copyrighted property of [employer]  or [employer]

## 2020-02-27 ENCOUNTER — ROUTINE PRENATAL (OUTPATIENT)
Dept: OBGYN CLINIC | Facility: CLINIC | Age: 30
End: 2020-02-27
Payer: COMMERCIAL

## 2020-02-27 VITALS
BODY MASS INDEX: 39.89 KG/M2 | DIASTOLIC BLOOD PRESSURE: 80 MMHG | HEIGHT: 65 IN | SYSTOLIC BLOOD PRESSURE: 128 MMHG | HEART RATE: 103 BPM | WEIGHT: 239.4 LBS

## 2020-02-27 DIAGNOSIS — Z3A.17 17 WEEKS GESTATION OF PREGNANCY: Primary | ICD-10-CM

## 2020-02-27 PROBLEM — Z3A.16 16 WEEKS GESTATION OF PREGNANCY: Status: ACTIVE | Noted: 2020-01-02

## 2020-02-27 LAB
SL AMB  POCT GLUCOSE, UA: NORMAL
SL AMB POCT URINE PROTEIN: NORMAL

## 2020-02-27 PROCEDURE — 81002 URINALYSIS NONAUTO W/O SCOPE: CPT | Performed by: OBSTETRICS & GYNECOLOGY

## 2020-02-27 PROCEDURE — PNV: Performed by: OBSTETRICS & GYNECOLOGY

## 2020-02-27 NOTE — PATIENT INSTRUCTIONS
Pregnancy at 23 to 22 Weeks   AMBULATORY CARE:   What changes are happening to your body:  Now that you are in your second trimester, you have more energy  You may also be feeling hungrier than usual  You may be gaining about ½ to 1 pound a week, and your pregnancy is beginning to show  You may need to start wearing maternity clothes  As your baby gets larger, you may have other symptoms  These may include body aches or stretch marks on your abdomen, breasts, thighs, or buttocks  Seek care immediately if:   · You develop a severe headache that does not go away  · You have new or increased vision changes, such as blurred or spotted vision  · You have new or increased swelling in your face or hands  · You have vaginal spotting or bleeding  · Your water broke or you feel warm water gushing or trickling from your vagina  Contact your healthcare provider if:   · You have abdominal cramps, pressure, or tightening  · You have a change in vaginal discharge  · You cannot keep food or drinks down, and you are losing weight  · You have chills or a fever  · You have vaginal itching, burning, or pain  · You have yellow, green, white, or foul-smelling vaginal discharge  · You have pain or burning when you urinate, less urine than usual, or pink or bloody urine  · You have questions or concerns about your condition or care  How to care for yourself at this stage of your pregnancy:   · Eat a variety of healthy foods  Healthy foods include fruits, vegetables, whole-grain breads, low-fat dairy foods, beans, lean meats, and fish  Drink liquids as directed  Ask how much liquid to drink each day and which liquids are best for you  Limit caffeine to less than 200 milligrams each day  Limit your intake of fish to 2 servings each week  Choose fish low in mercury such as canned light tuna, shrimp, salmon, cod, or tilapia   Do not  eat fish high in mercury such as swordfish, tilefish, timur mackerel, and shark      · Take prenatal vitamins as directed  Your need for certain vitamins and minerals, such as folic acid, increases during pregnancy  Prenatal vitamins provide some of the extra vitamins and minerals you need  Prenatal vitamins may also help to decrease the risk of certain birth defects  · Talk to your healthcare provider about exercise  Moderate exercise can help you stay fit  Your healthcare provider will help you plan an exercise program that is safe for you during pregnancy  · Do not smoke  If you smoke, it is never too late to quit  Smoking increases your risk of a miscarriage and other health problems during your pregnancy  Smoking can cause your baby to be born too early or weigh less at birth  Ask your healthcare provider for information if you need help quitting  · Do not drink alcohol  Alcohol passes from your body to your baby through the placenta  It can affect your baby's brain development and cause fetal alcohol syndrome (FAS)  FAS is a group of conditions that causes mental, behavior, and growth problems  · Talk to your healthcare provider before you take any medicines  Many medicines may harm your baby if you take them when you are pregnant  Do not take any medicines, vitamins, herbs, or supplements without first talking to your healthcare provider  Never use illegal or street drugs (such as marijuana or cocaine) while you are pregnant  Safety tips during pregnancy:   · Avoid hot tubs and saunas  Do not use a hot tub or sauna while you are pregnant, especially during your first trimester  Hot tubs and saunas may raise your baby's temperature and increase the risk of birth defects  · Avoid toxoplasmosis  This is an infection caused by eating raw meat or being around infected cat feces  It can cause birth defects, miscarriages, and other problems  Wash your hands after you touch raw meat  Make sure any meat is well-cooked before you eat it   Avoid raw eggs and unpasteurized milk  Use gloves or ask someone else to clean your cat's litter box while you are pregnant  Changes that are happening with your baby:  By 22 weeks, your baby is about 8 inches long from the top of the head to the rump (baby's bottom)  Your baby also weighs about 1 pound  Your baby is becoming much more active  You may be able to feel the baby move inside you now  The first movements may not be that noticeable  They may feel like a fluttering sensation  As time goes on, your baby's movements will become stronger and more noticeable  What you need to know about prenatal care:  During the first 28 weeks of your pregnancy, you will see your healthcare provider once a month  Your healthcare provider will check your blood pressure and weight  You may also need the following:  · A urine test  may also be done to check for sugar and protein  These can be signs of gestational diabetes or infection  Protein in your urine may also be a sign of preeclampsia  Preeclampsia is a condition that can develop during week 20 or later of your pregnancy  It causes high blood pressure, and it can cause problems with your kidneys and other organs  · Fundal height  is a measurement of your uterus to check your baby's growth  This number is usually the same as the number of weeks that you have been pregnant  · A fetal ultrasound  shows pictures of your baby inside your uterus  It shows your baby's development  The movement and position of your baby can also be seen  Your healthcare provider may be able to tell you what your baby's gender is during the ultrasound  · Your baby's heart rate  will be checked  © 2017 [employer] Information is for End User's use only and may not be sold, redistributed or otherwise used for commercial purposes  All illustrations and images included in CareNotes® are the copyrighted property of [employer]  or [employer]    The above information is an  only  It is not intended as medical advice for individual conditions or treatments  Talk to your doctor, nurse or pharmacist before following any medical regimen to see if it is safe and effective for you  Pregnancy at 15 to 18 Weeks   [address]:   What changes are happening in my body? Now that you are in your second trimester, you have more energy  You may also feel hungrier than usual  You may start to experience other symptoms, such as heartburn or dizziness  You may be gaining about ½ to 1 pound a week, and your pregnancy is beginning to show  You may need to start wearing maternity clothes  How do I care for myself at this stage of my pregnancy? · Manage heartburn  by eating 4 or 5 small meals each day instead of large meals  Avoid spicy foods  Avoid eating right before bedtime  · Manage nausea and vomiting  Avoid fatty and spicy foods  Eat small meals throughout the day instead of large meals  Nevaeh may help to decrease nausea  Ask your healthcare provider about other ways of decreasing nausea and vomiting  · Eat a variety of healthy foods  Healthy foods include fruits, vegetables, whole-grain breads, low-fat dairy foods, beans, lean meats, and fish  Drink liquids as directed  Ask how much liquid to drink each day and which liquids are best for you  Limit caffeine to less than 200 milligrams each day  Limit your intake of fish to 2 servings each week  Choose fish low in mercury such as canned light tuna, shrimp, salmon, cod, or tilapia  Do not  eat fish high in mercury such as swordfish, tilefish, timur mackerel, and shark  · Take prenatal vitamins as directed  Your need for certain vitamins and minerals, such as folic acid, increases during pregnancy  Prenatal vitamins provide some of the extra vitamins and minerals you need  Prenatal vitamins may also help to decrease the risk of certain birth defects  · Do not smoke    If you smoke, it is never too late to quit  Smoking increases your risk of a miscarriage and other health problems during your pregnancy  Smoking can cause your baby to be born too early or weigh less at birth  Ask your healthcare provider for information if you need help quitting  · Do not drink alcohol  Alcohol passes from your body to your baby through the placenta  It can affect your baby's brain development and cause fetal alcohol syndrome (FAS)  FAS is a group of conditions that causes mental, behavior, and growth problems  · Talk to your healthcare provider before you take any medicines  Many medicines may harm your baby if you take them when you are pregnant  Do not take any medicines, vitamins, herbs, or supplements without first talking to your healthcare provider  Never use illegal or street drugs (such as marijuana or cocaine) while you are pregnant  What are some safety tips during pregnancy? · Avoid hot tubs and saunas  Do not use a hot tub or sauna while you are pregnant, especially during your first trimester  Hot tubs and saunas may raise your baby's temperature and increase the risk of birth defects  · Avoid toxoplasmosis  This is an infection caused by eating raw meat or being around infected cat feces  It can cause birth defects, miscarriages, and other problems  Wash your hands after you touch raw meat  Make sure any meat is well-cooked before you eat it  Avoid raw eggs and unpasteurized milk  Use gloves or ask someone else to clean your cat's litter box while you are pregnant  What changes are happening with my baby? By 18 weeks, your baby may be about 6 inches long from the top of the head to the rump (baby's bottom)  Your baby may weigh about 11 ounces  You may be able to feel your baby's movement at about 18 weeks or later  The first movements may not be that noticeable  They may feel like a fluttering sensation  Your baby also makes sucking movements and can hear certain sounds     What do I need to know about prenatal care? During the first 28 weeks of your pregnancy, you will see your healthcare provider once a month  Your healthcare provider will check your blood pressure and weight  You may also need any of the following:  · A urine test  may also be done to check for sugar and protein  These can be signs of gestational diabetes or infection  · A blood test  may be done to check for anemia (low iron level)  · Fundal height check  is a measurement of your uterus to check your baby's growth  This number is usually the same as the number of weeks that you have been pregnant  · An ultrasound  may be done to check your baby's development  Your healthcare provider may be able to tell you what your baby's gender is during the ultrasound  · Your baby's heart rate  will be checked  When should I seek immediate care? · You have pain or cramping in your abdomen or low back  · You have heavy vaginal bleeding or clotting  · You pass material that looks like tissue or large clots  Collect the material and bring it with you  When should I contact my healthcare provider? · You cannot keep food or drinks down, and you are losing weight  · You have light bleeding  · You have chills or a fever  · You have vaginal itching, burning, or pain  · You have yellow, green, white, or foul-smelling vaginal discharge  · You have pain or burning when you urinate, less urine than usual, or pink or bloody urine  · You have questions or concerns about your condition or care  CARE AGREEMENT:   You have the right to help plan your care  Learn about your health condition and how it may be treated  Discuss treatment options with your caregivers to decide what care you want to receive  You always have the right to refuse treatment  The above information is an  only  It is not intended as medical advice for individual conditions or treatments   Talk to your doctor, nurse or pharmacist before following any medical regimen to see if it is safe and effective for you  © 2017 [address] [employer] Information is for End User's use only and may not be sold, redistributed or otherwise used for commercial purposes  All illustrations and images included in CareNotes® are the copyrighted property of A [employer] M [employer]  or [employer] Verg[employer]

## 2020-02-27 NOTE — PROGRESS NOTES
Patient was seen today for prenatal visit  1  16 5 weeks-doing well  Good fetal movement noted  Fetal movement and  labor precautions were reviewed  She declines genetic testing  She has MFM ultrasound at 20 weeks  Follow-up 4 weeks prenatal   She does note some muscular stretching right-sided, likely related to holding her daughter  Practical recommendations were reviewed  Maternity belt sheet given and she will consider this as well  2  Prior [id number] trial of labor, for repeat  at 39-40 weeks to be planned  3  Increased BMI-consider testing after 36 weeks if BMI greater than 40

## 2020-03-20 ENCOUNTER — TELEPHONE (OUTPATIENT)
Dept: PERINATAL CARE | Facility: OTHER | Age: 30
End: 2020-03-20

## 2020-03-23 ENCOUNTER — ROUTINE PRENATAL (OUTPATIENT)
Dept: PERINATAL CARE | Facility: CLINIC | Age: 30
End: 2020-03-23
Payer: COMMERCIAL

## 2020-03-23 ENCOUNTER — ROUTINE PRENATAL (OUTPATIENT)
Dept: OBGYN CLINIC | Facility: CLINIC | Age: 30
End: 2020-03-23
Payer: COMMERCIAL

## 2020-03-23 VITALS
BODY MASS INDEX: 40.74 KG/M2 | SYSTOLIC BLOOD PRESSURE: 114 MMHG | HEART RATE: 105 BPM | WEIGHT: 244.8 LBS | DIASTOLIC BLOOD PRESSURE: 76 MMHG | RESPIRATION RATE: 99 BRPM

## 2020-03-23 VITALS
SYSTOLIC BLOOD PRESSURE: 126 MMHG | BODY MASS INDEX: 40.92 KG/M2 | HEART RATE: 89 BPM | DIASTOLIC BLOOD PRESSURE: 66 MMHG | HEIGHT: 65 IN | WEIGHT: 245.6 LBS

## 2020-03-23 DIAGNOSIS — O34.211 MATERNAL CARE DUE TO LOW TRANSVERSE UTERINE SCAR FROM PREVIOUS CESAREAN DELIVERY: ICD-10-CM

## 2020-03-23 DIAGNOSIS — O99.210 OBESITY AFFECTING PREGNANCY, ANTEPARTUM: Primary | ICD-10-CM

## 2020-03-23 DIAGNOSIS — Z34.83 PRENATAL CARE, SUBSEQUENT PREGNANCY, THIRD TRIMESTER: ICD-10-CM

## 2020-03-23 DIAGNOSIS — O44.42 LOW LYING PLACENTA NOS OR WITHOUT HEMORRHAGE, SECOND TRIMESTER: ICD-10-CM

## 2020-03-23 DIAGNOSIS — Z3A.20 20 WEEKS GESTATION OF PREGNANCY: ICD-10-CM

## 2020-03-23 DIAGNOSIS — Z82.49 FAMILY HISTORY OF DVT: ICD-10-CM

## 2020-03-23 DIAGNOSIS — Z3A.20 20 WEEKS GESTATION OF PREGNANCY: Primary | ICD-10-CM

## 2020-03-23 LAB
SL AMB  POCT GLUCOSE, UA: NORMAL
SL AMB POCT URINE PROTEIN: NORMAL

## 2020-03-23 PROCEDURE — 76811 OB US DETAILED SNGL FETUS: CPT | Performed by: OBSTETRICS & GYNECOLOGY

## 2020-03-23 PROCEDURE — 76817 TRANSVAGINAL US OBSTETRIC: CPT | Performed by: OBSTETRICS & GYNECOLOGY

## 2020-03-23 PROCEDURE — 81002 URINALYSIS NONAUTO W/O SCOPE: CPT | Performed by: OBSTETRICS & GYNECOLOGY

## 2020-03-23 PROCEDURE — 99213 OFFICE O/P EST LOW 20 MIN: CPT | Performed by: OBSTETRICS & GYNECOLOGY

## 2020-03-23 PROCEDURE — 1036F TOBACCO NON-USER: CPT | Performed by: OBSTETRICS & GYNECOLOGY

## 2020-03-23 PROCEDURE — PNV: Performed by: OBSTETRICS & GYNECOLOGY

## 2020-03-23 NOTE — PATIENT INSTRUCTIONS
Covid - 19 instructions    If you are having any of the following     Cough   Shortness of breath   Fever  If traveled internationally or to high risk [place] states  Or been in contact with someone that has     Please call:    [phone]  option [phone]    They will screen you and direct you to the nearest testing location     Should not come to the PCP or OB office without calling that number first

## 2020-03-23 NOTE — PROGRESS NOTES
REQUIRED DOCUMENTATION:     1  This service was provided via Telemedicine  2  Provider located at [facility]   3  TeleMed provider: Brian López MD   4  Identify all parties in room with patient during tele consult:  Yes and door was shut  5  After connecting through televideo, patient was identified by name and date of birth and assistant checked wristband  Patient was then informed that this was a Telemedicine visit and that the exam was being conducted confidentially over secure lines  My office door was closed  No one else was in the room  Patient acknowledged consent and understanding of privacy and security of the Telemedicine visit, and gave us permission to have the assistant stay in the room in order to assist with the history and to conduct the exam   I informed the patient that I have reviewed their record in Epic and presented the opportunity for them to ask any questions regarding the visit today  The patient agreed to participate  Thank you very much for referring this very nice patient for a fetal anatomic evaluation  This is the patient's 2nd pregnancy  She is well known to me from her 1st pregnancy  She had a prior full-term  delivery  Today's visit was a virtual face-to-face visit utilizing [employer]  Please see Epic note for additional details  Briefly, other than her prior obstetrical history of a  delivery and her BMI of 40, she has no other significant medical or surgical history  She denies the current use of tobacco, alcohol, or drugs  She currently takes prenatal vitamins and Tylenol with codeine as needed for migraines  She has no known drug allergies  Her family medical history is significant for her father with a pulmonary embolus  He did not have any known risk factors for that  Other family members have had diabetes  A review of systems is otherwise negative      The patient has declined genetic screening, and we discussed that a normal ultrasound does not exclude all congenital birth defects or karyotypic abnormalities  Today's ultrasound is limited by fetal position; therefore, the fetal anatomic survey could not be completed  No significant fetal abnormalities are appreciated or suspected  Good fetal movement and tone are seen  The amniotic fluid volume appears normal   A transvaginal ultrasound was performed to assess the cervix, which was not seen well transabdominally  The cervical length was 4 1 centimeters, which is normal for the current gestational age  There was no significant funneling or dynamic changes appreciated  She was informed of today's findings and all of her questions were answered  The limitations of ultrasound were reviewed  We discussed today's findings of a low-lying placenta  We discussed that a low-lying placenta is a common 2nd trimester finding and usually resolves by the late 3rd trimester  It may increase the risk of vaginal bleeding if it does not resolve by the 3rd trimester and  delivery may be necessary if it is not greater than 1 cm away from the internal os by term  We discussed follow-up this condition and I recommend she return at 32-34 weeks to re-evaluate the placenta location  The implications of obesity and pregnancy are significant  The level of obesity is directly related to the risk of adverse pregnancy outcomes including but not limited to, risk of diabetes, hypertensive disorders of pregnancy, macrosomia, intrauterine growth restriction, labor and shoulder dystocias,  section, and increased risk of stillbirth  Recommend discussing the current weight gain recommendations for women with obesity and discussing good dietary practices as well as the safety of exercise in pregnancy  I recommend the patient gain no more than 11-20 pounds throughout her entire pregnancy, increase her exercise and follow healthy dietary habits    Consider referral to a dietitian should the patient have difficulty following the aforementioned recommendations  Recommend third trimester growth ultrasounds to screen for fetal growth problems as well as ensuring the patient is appropriately screened for pregestational and gestational diabetes  Additional  surveillance is recommended starting at 36 weeks in those women with a BMI of greater than 40 given the increased risk for stillbirth  I recommend an inherited thrombophilia workup to include factor 5 Leiden, prothrombin gene mutation, and    We discussed follow-up in detail and I recommend she return in 12-14 weeks to our Center in order to complete the fetal anatomic survey  Please note, in addition to the time spent discussing the results of the ultrasound, I spent approximately 15 minutes of face-to-face time with the patient via [employer], greater than 50% of which was spent in counseling and the coordination of care for this patient  Thank you very much for allowing us to participate in the care of this very nice patient  Should you have any questions, please do not hesitate to contact me

## 2020-03-23 NOTE — PROGRESS NOTES
The patient was seen today for an ultrasound  Please see ultrasound report (located under Ob Procedures) for additional details  Thank you very much for allowing us to participate in the care of this very nice patient  Should you have any questions, please do not hesitate to contact me  Sagar Montes MD [id number] [facility]  Attending Physician, Will

## 2020-03-23 NOTE — PROGRESS NOTES
20 weeks going great  No complaints  Has slip for the 28 week labs and dates to do it  She knows visit could be spaced out    Follow up in 4-6 weeks depending

## 2020-03-23 NOTE — PROGRESS NOTES
A transvaginal ultrasound was performed  Sonographer note on use of High Level Disinfection process (Trophon) for transvaginal probe #1 used, serial [id number]     Kristen MULLINS, SOLE, RVT

## 2020-04-23 ENCOUNTER — ROUTINE PRENATAL (OUTPATIENT)
Dept: OBGYN CLINIC | Facility: CLINIC | Age: 30
End: 2020-04-23
Payer: COMMERCIAL

## 2020-04-23 VITALS — DIASTOLIC BLOOD PRESSURE: 84 MMHG | SYSTOLIC BLOOD PRESSURE: 130 MMHG | WEIGHT: 248.4 LBS | BODY MASS INDEX: 41.34 KG/M2

## 2020-04-23 DIAGNOSIS — O44.42 LOW LYING PLACENTA NOS OR WITHOUT HEMORRHAGE, SECOND TRIMESTER: ICD-10-CM

## 2020-04-23 DIAGNOSIS — Z3A.24 24 WEEKS GESTATION OF PREGNANCY: Primary | ICD-10-CM

## 2020-04-23 DIAGNOSIS — Z82.49 FAMILY HISTORY OF DVT: ICD-10-CM

## 2020-04-23 DIAGNOSIS — E66.01 OBESITY, MORBID, BMI 40.0-49.9 (HCC): ICD-10-CM

## 2020-04-23 LAB
SL AMB  POCT GLUCOSE, UA: ABNORMAL
SL AMB POCT URINE PROTEIN: ABNORMAL

## 2020-04-23 PROCEDURE — 81002 URINALYSIS NONAUTO W/O SCOPE: CPT | Performed by: OBSTETRICS & GYNECOLOGY

## 2020-04-23 PROCEDURE — PNV: Performed by: OBSTETRICS & GYNECOLOGY

## 2020-04-27 ENCOUNTER — TRANSCRIBE ORDERS (OUTPATIENT)
Dept: LAB | Facility: HOSPITAL | Age: 30
End: 2020-04-27

## 2020-04-27 ENCOUNTER — APPOINTMENT (OUTPATIENT)
Dept: LAB | Facility: MEDICAL CENTER | Age: 30
End: 2020-04-27
Payer: COMMERCIAL

## 2020-04-27 DIAGNOSIS — Z34.83 PRENATAL CARE, SUBSEQUENT PREGNANCY, THIRD TRIMESTER: ICD-10-CM

## 2020-04-27 DIAGNOSIS — Z34.90 PRENATAL CARE, ANTEPARTUM: Primary | ICD-10-CM

## 2020-04-27 LAB
BASOPHILS # BLD AUTO: 0.03 THOUSANDS/ΜL (ref 0–0.1)
BASOPHILS NFR BLD AUTO: 0 % (ref 0–1)
EOSINOPHIL # BLD AUTO: 0.04 THOUSAND/ΜL (ref 0–0.61)
EOSINOPHIL NFR BLD AUTO: 1 % (ref 0–6)
ERYTHROCYTE [DISTWIDTH] IN BLOOD BY AUTOMATED COUNT: 14 % (ref 11.6–15.1)
HCT VFR BLD AUTO: 37 % (ref 34.8–46.1)
HGB BLD-MCNC: 11.9 G/DL (ref 11.5–15.4)
IMM GRANULOCYTES # BLD AUTO: 0.04 THOUSAND/UL (ref 0–0.2)
IMM GRANULOCYTES NFR BLD AUTO: 1 % (ref 0–2)
LYMPHOCYTES # BLD AUTO: 1.71 THOUSANDS/ΜL (ref 0.6–4.47)
LYMPHOCYTES NFR BLD AUTO: 22 % (ref 14–44)
MCH RBC QN AUTO: 27.4 PG (ref 26.8–34.3)
MCHC RBC AUTO-ENTMCNC: 32.2 G/DL (ref 31.4–37.4)
MCV RBC AUTO: 85 FL (ref 82–98)
MONOCYTES # BLD AUTO: 0.52 THOUSAND/ΜL (ref 0.17–1.22)
MONOCYTES NFR BLD AUTO: 7 % (ref 4–12)
NEUTROPHILS # BLD AUTO: 5.29 THOUSANDS/ΜL (ref 1.85–7.62)
NEUTS SEG NFR BLD AUTO: 69 % (ref 43–75)
NRBC BLD AUTO-RTO: 0 /100 WBCS
PLATELET # BLD AUTO: 282 THOUSANDS/UL (ref 149–390)
PMV BLD AUTO: 10.9 FL (ref 8.9–12.7)
RBC # BLD AUTO: 4.35 MILLION/UL (ref 3.81–5.12)
WBC # BLD AUTO: 7.63 THOUSAND/UL (ref 4.31–10.16)

## 2020-04-27 PROCEDURE — 85301 ANTITHROMBIN III ANTIGEN: CPT | Performed by: OBSTETRICS & GYNECOLOGY

## 2020-04-27 PROCEDURE — 81241 F5 GENE: CPT | Performed by: OBSTETRICS & GYNECOLOGY

## 2020-04-27 PROCEDURE — 36415 COLL VENOUS BLD VENIPUNCTURE: CPT | Performed by: OBSTETRICS & GYNECOLOGY

## 2020-04-27 PROCEDURE — 81240 F2 GENE: CPT | Performed by: OBSTETRICS & GYNECOLOGY

## 2020-04-27 PROCEDURE — 85025 COMPLETE CBC W/AUTO DIFF WBC: CPT

## 2020-04-28 ENCOUNTER — APPOINTMENT (OUTPATIENT)
Dept: LAB | Facility: MEDICAL CENTER | Age: 30
End: 2020-04-28
Payer: COMMERCIAL

## 2020-04-28 DIAGNOSIS — Z34.90 PRENATAL CARE, ANTEPARTUM: ICD-10-CM

## 2020-04-28 LAB — AT III AG ACT/NOR PPP IA: 97 % (ref 72–124)

## 2020-04-28 PROCEDURE — 36415 COLL VENOUS BLD VENIPUNCTURE: CPT

## 2020-04-28 PROCEDURE — 86592 SYPHILIS TEST NON-TREP QUAL: CPT

## 2020-04-29 LAB — RPR SER QL: NORMAL

## 2020-04-30 ENCOUNTER — TELEPHONE (OUTPATIENT)
Dept: PERINATAL CARE | Facility: OTHER | Age: 30
End: 2020-04-30

## 2020-05-01 LAB — F2 GENE MUT ANL BLD/T: NORMAL

## 2020-05-04 ENCOUNTER — TELEPHONE (OUTPATIENT)
Dept: PERINATAL CARE | Facility: OTHER | Age: 30
End: 2020-05-04

## 2020-05-04 LAB — F5 GENE MUT ANL BLD/T: NORMAL

## 2020-05-04 NOTE — RESULT ENCOUNTER NOTE
I have reviewed the labs which are normal in pregnancy  Please contact the patient and notify her of the normal results if she has not reviewed them in [address]     Thank you
negative - no atopy

## 2020-05-18 ENCOUNTER — ROUTINE PRENATAL (OUTPATIENT)
Dept: OBGYN CLINIC | Facility: CLINIC | Age: 30
End: 2020-05-18
Payer: COMMERCIAL

## 2020-05-18 VITALS
DIASTOLIC BLOOD PRESSURE: 82 MMHG | BODY MASS INDEX: 41.92 KG/M2 | HEIGHT: 65 IN | WEIGHT: 251.6 LBS | SYSTOLIC BLOOD PRESSURE: 140 MMHG

## 2020-05-18 DIAGNOSIS — Z82.49 FAMILY HISTORY OF DVT: ICD-10-CM

## 2020-05-18 DIAGNOSIS — Z3A.28 28 WEEKS GESTATION OF PREGNANCY: Primary | ICD-10-CM

## 2020-05-18 LAB
ERYTHROCYTE [DISTWIDTH] IN BLOOD BY AUTOMATED COUNT: 13.8 % (ref 11.6–15.1)
GLUCOSE 1H P 50 G GLC PO SERPL-MCNC: 121 MG/DL
HCT VFR BLD AUTO: 35 % (ref 34.8–46.1)
HGB BLD-MCNC: 11.3 G/DL (ref 11.5–15.4)
MCH RBC QN AUTO: 28 PG (ref 26.8–34.3)
MCHC RBC AUTO-ENTMCNC: 32.3 G/DL (ref 31.4–37.4)
MCV RBC AUTO: 87 FL (ref 82–98)
PLATELET # BLD AUTO: 269 THOUSANDS/UL (ref 149–390)
PMV BLD AUTO: 11.3 FL (ref 8.9–12.7)
RBC # BLD AUTO: 4.04 MILLION/UL (ref 3.81–5.12)
SL AMB  POCT GLUCOSE, UA: NORMAL
SL AMB POCT URINE PROTEIN: NORMAL
WBC # BLD AUTO: 7.19 THOUSAND/UL (ref 4.31–10.16)

## 2020-05-18 PROCEDURE — 90715 TDAP VACCINE 7 YRS/> IM: CPT | Performed by: OBSTETRICS & GYNECOLOGY

## 2020-05-18 PROCEDURE — 81002 URINALYSIS NONAUTO W/O SCOPE: CPT | Performed by: OBSTETRICS & GYNECOLOGY

## 2020-05-18 PROCEDURE — 86592 SYPHILIS TEST NON-TREP QUAL: CPT | Performed by: OBSTETRICS & GYNECOLOGY

## 2020-05-18 PROCEDURE — 82950 GLUCOSE TEST: CPT | Performed by: OBSTETRICS & GYNECOLOGY

## 2020-05-18 PROCEDURE — 36415 COLL VENOUS BLD VENIPUNCTURE: CPT | Performed by: OBSTETRICS & GYNECOLOGY

## 2020-05-18 PROCEDURE — PNV: Performed by: OBSTETRICS & GYNECOLOGY

## 2020-05-18 PROCEDURE — 85027 COMPLETE CBC AUTOMATED: CPT | Performed by: OBSTETRICS & GYNECOLOGY

## 2020-05-18 PROCEDURE — 90471 IMMUNIZATION ADMIN: CPT | Performed by: OBSTETRICS & GYNECOLOGY

## 2020-05-19 LAB — RPR SER QL: NORMAL

## 2020-06-12 ENCOUNTER — TELEPHONE (OUTPATIENT)
Dept: PERINATAL CARE | Facility: CLINIC | Age: 30
End: 2020-06-12

## 2020-06-15 ENCOUNTER — ULTRASOUND (OUTPATIENT)
Dept: PERINATAL CARE | Facility: CLINIC | Age: 30
End: 2020-06-15
Payer: COMMERCIAL

## 2020-06-15 ENCOUNTER — ROUTINE PRENATAL (OUTPATIENT)
Dept: OBGYN CLINIC | Facility: CLINIC | Age: 30
End: 2020-06-15
Payer: COMMERCIAL

## 2020-06-15 VITALS
WEIGHT: 251.2 LBS | TEMPERATURE: 97.5 F | HEART RATE: 87 BPM | HEIGHT: 65 IN | DIASTOLIC BLOOD PRESSURE: 81 MMHG | BODY MASS INDEX: 41.85 KG/M2 | SYSTOLIC BLOOD PRESSURE: 136 MMHG

## 2020-06-15 VITALS
WEIGHT: 252.2 LBS | DIASTOLIC BLOOD PRESSURE: 84 MMHG | HEIGHT: 65 IN | BODY MASS INDEX: 42.02 KG/M2 | SYSTOLIC BLOOD PRESSURE: 116 MMHG

## 2020-06-15 DIAGNOSIS — Z82.49 FAMILY HISTORY OF DVT: ICD-10-CM

## 2020-06-15 DIAGNOSIS — Z3A.32 32 WEEKS GESTATION OF PREGNANCY: Primary | ICD-10-CM

## 2020-06-15 DIAGNOSIS — J30.9 ALLERGIC RHINITIS, UNSPECIFIED SEASONALITY, UNSPECIFIED TRIGGER: ICD-10-CM

## 2020-06-15 DIAGNOSIS — Z3A.32 32 WEEKS GESTATION OF PREGNANCY: ICD-10-CM

## 2020-06-15 LAB
SL AMB  POCT GLUCOSE, UA: NEGATIVE
SL AMB POCT URINE PROTEIN: NEGATIVE

## 2020-06-15 PROCEDURE — 76816 OB US FOLLOW-UP PER FETUS: CPT | Performed by: OBSTETRICS & GYNECOLOGY

## 2020-06-15 PROCEDURE — PNV: Performed by: OBSTETRICS & GYNECOLOGY

## 2020-06-15 PROCEDURE — 81002 URINALYSIS NONAUTO W/O SCOPE: CPT | Performed by: OBSTETRICS & GYNECOLOGY

## 2020-06-21 ENCOUNTER — TELEPHONE (OUTPATIENT)
Dept: OTHER | Facility: OTHER | Age: 30
End: 2020-06-21

## 2020-07-02 ENCOUNTER — ROUTINE PRENATAL (OUTPATIENT)
Dept: OBGYN CLINIC | Facility: CLINIC | Age: 30
End: 2020-07-02
Payer: COMMERCIAL

## 2020-07-02 VITALS
DIASTOLIC BLOOD PRESSURE: 80 MMHG | WEIGHT: 257 LBS | BODY MASS INDEX: 42.82 KG/M2 | SYSTOLIC BLOOD PRESSURE: 132 MMHG | HEIGHT: 65 IN

## 2020-07-02 DIAGNOSIS — N89.8 VAGINAL DISCHARGE: ICD-10-CM

## 2020-07-02 DIAGNOSIS — Z98.891 HISTORY OF CESAREAN SECTION: ICD-10-CM

## 2020-07-02 DIAGNOSIS — Z3A.34 34 WEEKS GESTATION OF PREGNANCY: Primary | ICD-10-CM

## 2020-07-02 DIAGNOSIS — Z82.49 FAMILY HISTORY OF DVT: ICD-10-CM

## 2020-07-02 LAB
BV WHIFF TEST VAG QL: NEGATIVE
CLUE CELLS SPEC QL WET PREP: NEGATIVE
PH SMN: 4 [PH]
SL AMB  POCT GLUCOSE, UA: NORMAL
SL AMB POCT URINE PROTEIN: NORMAL
SL AMB POCT WET MOUNT: YES
T VAGINALIS VAG QL WET PREP: NEGATIVE
YEAST VAG QL WET PREP: NEGATIVE

## 2020-07-02 PROCEDURE — PNV: Performed by: OBSTETRICS & GYNECOLOGY

## 2020-07-02 PROCEDURE — 87210 SMEAR WET MOUNT SALINE/INK: CPT | Performed by: OBSTETRICS & GYNECOLOGY

## 2020-07-02 PROCEDURE — 81002 URINALYSIS NONAUTO W/O SCOPE: CPT | Performed by: OBSTETRICS & GYNECOLOGY

## 2020-07-02 NOTE — PATIENT INSTRUCTIONS
Pregnancy at 28 to [address]:   What changes are happening in my body? You are considered full term at the beginning of 37 weeks  Your breathing may be easier if your baby has moved down into a head-down position  You may need to urinate more often because the baby may be pressing on your bladder  You may also feel more discomfort and get tired easily  How do I care for myself at this stage of my pregnancy? · Eat a variety of healthy foods  Healthy foods include fruits, vegetables, whole-grain breads, low-fat dairy foods, beans, lean meats, and fish  Drink liquids as directed  Ask how much liquid to drink each day and which liquids are best for you  Limit caffeine to less than 200 milligrams each day  Limit your intake of fish to 2 servings each week  Choose fish low in mercury such as canned light tuna, shrimp, salmon, cod, or tilapia  Do not  eat fish high in mercury such as swordfish, tilefish, timur mackerel, and shark  · Take prenatal vitamins as directed  Your need for certain vitamins and minerals, such as folic acid, increases during pregnancy  Prenatal vitamins provide some of the extra vitamins and minerals you need  Prenatal vitamins may also help to decrease the risk of certain birth defects  · Rest as needed  Put your feet up if you have swelling in your ankles and feet  · Do not smoke  If you smoke, it is never too late to quit  Smoking increases your risk of a miscarriage and other health problems during your pregnancy  Smoking can cause your baby to be born too early or weigh less at birth  Ask your healthcare provider for information if you need help quitting  · Do not drink alcohol  Alcohol passes from your body to your baby through the placenta  It can affect your baby's brain development and cause fetal alcohol syndrome (FAS)  FAS is a group of conditions that causes mental, behavior, and growth problems       · Talk to your healthcare provider before you take any medicines  Many medicines may harm your baby if you take them when you are pregnant  Do not take any medicines, vitamins, herbs, or supplements without first talking to your healthcare provider  Never use illegal or street drugs (such as marijuana or cocaine) while you are pregnant  · Talk to your healthcare provider before you travel  You may not be able to travel in an airplane after 36 weeks  He may also recommend that you avoid long road trips  What are some safety tips during pregnancy? · Avoid hot tubs and saunas  Do not use a hot tub or sauna while you are pregnant, especially during your first trimester  Hot tubs and saunas may raise your baby's temperature and increase the risk of birth defects  · Avoid toxoplasmosis  This is an infection caused by eating raw meat or being around infected cat feces  It can cause birth defects, miscarriages, and other problems  Wash your hands after you touch raw meat  Make sure any meat is well-cooked before you eat it  Avoid raw eggs and unpasteurized milk  Use gloves or ask someone else to clean your cat's litter box while you are pregnant  · Ask your healthcare provider about travel  The most comfortable time to travel is during the second trimester  Ask your healthcare provider if you can travel after 36 weeks  You may not be able to travel in an airplane after 36 weeks  He may also recommend that you avoid long road trips  What changes are happening with my baby? By 38 weeks, your baby may weigh between 6 and 9 pounds  Your baby may be about 14 inches long from the top of the head to the rump (baby's bottom)  Your baby hears well enough to know your voice  As your baby gets larger, you may feel fewer kicks and more stretching and rolling  Your baby may move into a head-down position  Your baby will also rest lower in your abdomen  What do I need to know about prenatal care?   Your healthcare provider will check your blood pressure and weight  You may also need the following:  · A urine test  may also be done to check for sugar and protein  These can be signs of gestational diabetes or infection  Protein in your urine may also be a sign of preeclampsia  Preeclampsia is a condition that can develop during week 20 or later of your pregnancy  It causes high blood pressure, and it can cause problems with your kidneys and other organs  · A blood test  may be done to check for anemia (low iron level)  · A Tdap vaccine  may be recommended by your healthcare provider  · A group B strep test  is a test that is done to check for group B strep infection  Group B strep is a type of bacteria that may be found in the vagina or rectum  It can be passed to your baby during delivery if you have it  Your healthcare provider will take swab your vagina or rectum and send the sample to the lab for tests  · Fundal height  is a measurement of your uterus to check your baby's growth  This number is usually the same as the number of weeks that you have been pregnant  Your healthcare provider may also check your baby's position  · Your baby's heart rate  will be checked  When should I seek immediate care? · You develop a severe headache that does not go away  · You have new or increased vision changes, such as blurred or spotted vision  · You have new or increased swelling in your face or hands  · You have vaginal spotting or bleeding  · Your water broke or you feel warm water gushing or trickling from your vagina  When should I contact my healthcare provider? · You have more than 5 contractions in 1 hour  · You notice any changes in your baby's movements  · You have abdominal cramps, pressure, or tightening  · You have a change in vaginal discharge  · You have chills or a fever  · You have vaginal itching, burning, or pain  · You have yellow, green, white, or foul-smelling vaginal discharge      · You have pain or burning when you urinate, less urine than usual, or pink or bloody urine  · You have questions or concerns about your condition or care  CARE AGREEMENT:   You have the right to help plan your care  Learn about your health condition and how it may be treated  Discuss treatment options with your caregivers to decide what care you want to receive  You always have the right to refuse treatment  The above information is an  only  It is not intended as medical advice for individual conditions or treatments  Talk to your doctor, nurse or pharmacist before following any medical regimen to see if it is safe and effective for you  © 2017 [address] Information is for End User's use only and may not be sold, redistributed or otherwise used for commercial purposes  All illustrations and images included in CareNotes® are the copyrighted property of A D A M [employer] Inc  or Chapin Vergara

## 2020-07-16 ENCOUNTER — ROUTINE PRENATAL (OUTPATIENT)
Dept: OBGYN CLINIC | Facility: CLINIC | Age: 30
End: 2020-07-16
Payer: COMMERCIAL

## 2020-07-16 VITALS
TEMPERATURE: 97.5 F | DIASTOLIC BLOOD PRESSURE: 82 MMHG | WEIGHT: 255 LBS | BODY MASS INDEX: 42.49 KG/M2 | HEIGHT: 65 IN | SYSTOLIC BLOOD PRESSURE: 132 MMHG

## 2020-07-16 DIAGNOSIS — Z3A.36 36 WEEKS GESTATION OF PREGNANCY: Primary | ICD-10-CM

## 2020-07-16 DIAGNOSIS — Z82.49 FAMILY HISTORY OF DVT: ICD-10-CM

## 2020-07-16 LAB
SL AMB  POCT GLUCOSE, UA: NORMAL
SL AMB POCT URINE PROTEIN: NORMAL

## 2020-07-16 PROCEDURE — 81002 URINALYSIS NONAUTO W/O SCOPE: CPT | Performed by: OBSTETRICS & GYNECOLOGY

## 2020-07-16 PROCEDURE — 87653 STREP B DNA AMP PROBE: CPT | Performed by: OBSTETRICS & GYNECOLOGY

## 2020-07-16 PROCEDURE — PNV: Performed by: OBSTETRICS & GYNECOLOGY

## 2020-07-16 NOTE — PROGRESS NOTES
Patient was seen today for prenatal visit  1  36 5 weeks-good fetal movement noted  Fetal movement and /labor precautions were reviewed  Group B strep was done today  Advanced pregnancy guidelines and perineal massage were reviewed and paperwork given  To follow-up 1 week time for prenatal visit with NST/BRYCE  2  Low-lying placenta-most recent ultrasound 2 27 cm from the os  She has had no recent bleeding  3  Previous W-ljxxlbz-rqllkkzk trial of labor  For repeat , scheduled 20 with me at 1300  4  Elevated BMI-start testing  5  Father with history of blood clot-thrombophilia testing was okay on the patient

## 2020-07-18 LAB — GP B STREP DNA SPEC QL NAA+PROBE: NORMAL

## 2020-07-23 ENCOUNTER — ROUTINE PRENATAL (OUTPATIENT)
Dept: OBGYN CLINIC | Facility: CLINIC | Age: 30
End: 2020-07-23
Payer: COMMERCIAL

## 2020-07-23 ENCOUNTER — ULTRASOUND (OUTPATIENT)
Dept: OBGYN CLINIC | Facility: CLINIC | Age: 30
End: 2020-07-23
Payer: COMMERCIAL

## 2020-07-23 VITALS
TEMPERATURE: 97.5 F | BODY MASS INDEX: 41.82 KG/M2 | DIASTOLIC BLOOD PRESSURE: 78 MMHG | WEIGHT: 251 LBS | HEIGHT: 65 IN | SYSTOLIC BLOOD PRESSURE: 120 MMHG

## 2020-07-23 DIAGNOSIS — Z82.49 FAMILY HISTORY OF DVT: ICD-10-CM

## 2020-07-23 DIAGNOSIS — Z3A.37 37 WEEKS GESTATION OF PREGNANCY: Primary | ICD-10-CM

## 2020-07-23 DIAGNOSIS — O99.213 OBESITY AFFECTING PREGNANCY IN THIRD TRIMESTER: Primary | ICD-10-CM

## 2020-07-23 DIAGNOSIS — Z98.891 HISTORY OF CESAREAN SECTION: ICD-10-CM

## 2020-07-23 LAB
SL AMB  POCT GLUCOSE, UA: NORMAL
SL AMB POCT URINE PROTEIN: NORMAL

## 2020-07-23 PROCEDURE — PNV: Performed by: OBSTETRICS & GYNECOLOGY

## 2020-07-23 PROCEDURE — 59025 FETAL NON-STRESS TEST: CPT | Performed by: OBSTETRICS & GYNECOLOGY

## 2020-07-23 PROCEDURE — 76815 OB US LIMITED FETUS(S): CPT | Performed by: OBSTETRICS & GYNECOLOGY

## 2020-07-23 PROCEDURE — 81002 URINALYSIS NONAUTO W/O SCOPE: CPT | Performed by: OBSTETRICS & GYNECOLOGY

## 2020-07-23 NOTE — PROGRESS NOTES
Patient was seen today for prenatal visit  She denies any complaints  1  37 5 weeks-good fetal movement noted  Fetal movement and labor precautions were reviewed  BRYCE was 9 9, vertex presentation   baseline reactive category 1 without decelerations or contractions noted  Patient aware of negative GBS testing  To follow-up 1 week time for prenatal visit with testing  2  Low-lying placenta-most recent ultrasound 2 27 cm from the os  She will call with any bleeding  3  Previous S-ejuxvdh-hgyihxum trial of labor  For repeat , schedule 20 with at 1300   4  Elevated BMI -continue testing  5  Father with history of blood clot-patient with negative thrombophilia testing by report

## 2020-07-23 NOTE — PATIENT INSTRUCTIONS
Pregnancy at 28 to [address]:   What changes are happening in my body? You are considered full term at the beginning of 37 weeks  Your breathing may be easier if your baby has moved down into a head-down position  You may need to urinate more often because the baby may be pressing on your bladder  You may also feel more discomfort and get tired easily  How do I care for myself at this stage of my pregnancy? · Eat a variety of healthy foods  Healthy foods include fruits, vegetables, whole-grain breads, low-fat dairy foods, beans, lean meats, and fish  Drink liquids as directed  Ask how much liquid to drink each day and which liquids are best for you  Limit caffeine to less than 200 milligrams each day  Limit your intake of fish to 2 servings each week  Choose fish low in mercury such as canned light tuna, shrimp, salmon, cod, or tilapia  Do not  eat fish high in mercury such as swordfish, tilefish, timur mackerel, and shark  · Take prenatal vitamins as directed  Your need for certain vitamins and minerals, such as folic acid, increases during pregnancy  Prenatal vitamins provide some of the extra vitamins and minerals you need  Prenatal vitamins may also help to decrease the risk of certain birth defects  · Rest as needed  Put your feet up if you have swelling in your ankles and feet  · Do not smoke  If you smoke, it is never too late to quit  Smoking increases your risk of a miscarriage and other health problems during your pregnancy  Smoking can cause your baby to be born too early or weigh less at birth  Ask your healthcare provider for information if you need help quitting  · Do not drink alcohol  Alcohol passes from your body to your baby through the placenta  It can affect your baby's brain development and cause fetal alcohol syndrome (FAS)  FAS is a group of conditions that causes mental, behavior, and growth problems       · Talk to your healthcare provider before you take any medicines  Many medicines may harm your baby if you take them when you are pregnant  Do not take any medicines, vitamins, herbs, or supplements without first talking to your healthcare provider  Never use illegal or street drugs (such as marijuana or cocaine) while you are pregnant  · Talk to your healthcare provider before you travel  You may not be able to travel in an airplane after 36 weeks  He may also recommend that you avoid long road trips  What are some safety tips during pregnancy? · Avoid hot tubs and saunas  Do not use a hot tub or sauna while you are pregnant, especially during your first trimester  Hot tubs and saunas may raise your baby's temperature and increase the risk of birth defects  · Avoid toxoplasmosis  This is an infection caused by eating raw meat or being around infected cat feces  It can cause birth defects, miscarriages, and other problems  Wash your hands after you touch raw meat  Make sure any meat is well-cooked before you eat it  Avoid raw eggs and unpasteurized milk  Use gloves or ask someone else to clean your cat's litter box while you are pregnant  · Ask your healthcare provider about travel  The most comfortable time to travel is during the second trimester  Ask your healthcare provider if you can travel after 36 weeks  You may not be able to travel in an airplane after 36 weeks  He may also recommend that you avoid long road trips  What changes are happening with my baby? By 38 weeks, your baby may weigh between 6 and 9 pounds  Your baby may be about 14 inches long from the top of the head to the rump (baby's bottom)  Your baby hears well enough to know your voice  As your baby gets larger, you may feel fewer kicks and more stretching and rolling  Your baby may move into a head-down position  Your baby will also rest lower in your abdomen  What do I need to know about prenatal care?   Your healthcare provider will check your blood pressure and weight  You may also need the following:  · A urine test  may also be done to check for sugar and protein  These can be signs of gestational diabetes or infection  Protein in your urine may also be a sign of preeclampsia  Preeclampsia is a condition that can develop during week 20 or later of your pregnancy  It causes high blood pressure, and it can cause problems with your kidneys and other organs  · A blood test  may be done to check for anemia (low iron level)  · A Tdap vaccine  may be recommended by your healthcare provider  · A group B strep test  is a test that is done to check for group B strep infection  Group B strep is a type of bacteria that may be found in the vagina or rectum  It can be passed to your baby during delivery if you have it  Your healthcare provider will take swab your vagina or rectum and send the sample to the lab for tests  · Fundal height  is a measurement of your uterus to check your baby's growth  This number is usually the same as the number of weeks that you have been pregnant  Your healthcare provider may also check your baby's position  · Your baby's heart rate  will be checked  When should I seek immediate care? · You develop a severe headache that does not go away  · You have new or increased vision changes, such as blurred or spotted vision  · You have new or increased swelling in your face or hands  · You have vaginal spotting or bleeding  · Your water broke or you feel warm water gushing or trickling from your vagina  When should I contact my healthcare provider? · You have more than 5 contractions in 1 hour  · You notice any changes in your baby's movements  · You have abdominal cramps, pressure, or tightening  · You have a change in vaginal discharge  · You have chills or a fever  · You have vaginal itching, burning, or pain  · You have yellow, green, white, or foul-smelling vaginal discharge      · You have pain or burning when you urinate, less urine than usual, or pink or bloody urine  · You have questions or concerns about your condition or care  CARE AGREEMENT:   You have the right to help plan your care  Learn about your health condition and how it may be treated  Discuss treatment options with your caregivers to decide what care you want to receive  You always have the right to refuse treatment  The above information is an  only  It is not intended as medical advice for individual conditions or treatments  Talk to your doctor, nurse or pharmacist before following any medical regimen to see if it is safe and effective for you  © 2017 [address] Information is for End User's use only and may not be sold, redistributed or otherwise used for commercial purposes  All illustrations and images included in CareNotes® are the copyrighted property of A D A M , Inc  or Chapin Vergara

## 2020-07-24 ENCOUNTER — TELEPHONE (OUTPATIENT)
Dept: OBGYN CLINIC | Facility: CLINIC | Age: 30
End: 2020-07-24

## 2020-07-30 ENCOUNTER — ROUTINE PRENATAL (OUTPATIENT)
Dept: OBGYN CLINIC | Facility: CLINIC | Age: 30
End: 2020-07-30
Payer: COMMERCIAL

## 2020-07-30 ENCOUNTER — ULTRASOUND (OUTPATIENT)
Dept: OBGYN CLINIC | Facility: CLINIC | Age: 30
End: 2020-07-30
Payer: COMMERCIAL

## 2020-07-30 VITALS
DIASTOLIC BLOOD PRESSURE: 80 MMHG | WEIGHT: 260 LBS | SYSTOLIC BLOOD PRESSURE: 128 MMHG | TEMPERATURE: 97.5 F | BODY MASS INDEX: 43.32 KG/M2 | HEIGHT: 65 IN

## 2020-07-30 DIAGNOSIS — O99.210 OBESITY IN PREGNANCY, ANTEPARTUM: Primary | ICD-10-CM

## 2020-07-30 DIAGNOSIS — Z82.49 FAMILY HISTORY OF DVT: ICD-10-CM

## 2020-07-30 DIAGNOSIS — Z3A.38 38 WEEKS GESTATION OF PREGNANCY: Primary | ICD-10-CM

## 2020-07-30 LAB
SL AMB  POCT GLUCOSE, UA: NORMAL
SL AMB POCT URINE PROTEIN: NORMAL

## 2020-07-30 PROCEDURE — 59025 FETAL NON-STRESS TEST: CPT | Performed by: OBSTETRICS & GYNECOLOGY

## 2020-07-30 PROCEDURE — PNV: Performed by: OBSTETRICS & GYNECOLOGY

## 2020-07-30 PROCEDURE — 76815 OB US LIMITED FETUS(S): CPT | Performed by: OBSTETRICS & GYNECOLOGY

## 2020-07-30 PROCEDURE — 81002 URINALYSIS NONAUTO W/O SCOPE: CPT | Performed by: OBSTETRICS & GYNECOLOGY

## 2020-07-30 NOTE — PROGRESS NOTES
Patient was seen today for prenatal visit  1  38 5 weeks-good fetal movement noted  Fetal movement and labor precautions were reviewed  Patient is scheduled for  on 20  All questions were answered  2  History of low-lying placenta-most recent ultrasound greater than 2 cm from the os  She will call with any bleeding  3  Previous C-maqgego-ukbhyinr trial of labor  For repeat  on 20 at 1300   4  Elevated BMI-testing today  with BRYCE 7 8, vertex presentation  NST 140s, reactive, category 1 without decelerations or contractions noted  11  Father with history of blood clot-patient with negative thrombophilia testing by her report previously

## 2020-08-03 ENCOUNTER — TELEPHONE (OUTPATIENT)
Dept: OTHER | Facility: OTHER | Age: 30
End: 2020-08-03

## 2020-08-03 ENCOUNTER — HOSPITAL ENCOUNTER (OUTPATIENT)
Facility: HOSPITAL | Age: 30
Discharge: HOME | End: 2020-08-03
Attending: OBSTETRICS & GYNECOLOGY | Admitting: OBSTETRICS & GYNECOLOGY
Payer: COMMERCIAL

## 2020-08-03 VITALS
HEIGHT: 65 IN | TEMPERATURE: 98.3 F | BODY MASS INDEX: 43.32 KG/M2 | HEART RATE: 106 BPM | RESPIRATION RATE: 18 BRPM | WEIGHT: 260 LBS | DIASTOLIC BLOOD PRESSURE: 74 MMHG | SYSTOLIC BLOOD PRESSURE: 133 MMHG

## 2020-08-03 PROBLEM — Z3A.39 39 WEEKS GESTATION OF PREGNANCY: Status: ACTIVE | Noted: 2020-01-02

## 2020-08-03 PROCEDURE — NC001 PR NO CHARGE: Performed by: OBSTETRICS & GYNECOLOGY

## 2020-08-03 PROCEDURE — 99213 OFFICE O/P EST LOW 20 MIN: CPT

## 2020-08-03 NOTE — TELEPHONE ENCOUNTER
Pt states that she thinks that she is having contractions 15-20 min apart   scheduled for 20    Noe Guevara

## 2020-08-04 NOTE — DISCHARGE INSTRUCTIONS
Early Labor Signs   WHAT YOU SHOULD KNOW:   Early labor signs are changes in your body that allow your baby to pass through your birth canal   INSTRUCTIONS:   Signs and symptoms of early labor:   · Lightening  occurs when your baby drops inside your pelvis  You may feel increased pressure in your pelvis  This may happen a few weeks to a few hours before your labor begins  · Contractions  are cramps and tightening that occur in your uterus to help move the baby through your birth canal  Contractions occur regularly and more often each time  Each one lasts about 30 to 70 seconds, and gets stronger and more painful until you deliver your baby  Contractions do not go away with movement  They start in your lower back and move to the front in your abdomen  · Effacement  occurs when your cervix softens and thins, so it can easily open for the baby  Your primary healthcare provider [facility] or obstetrician will examine your cervix for effacement  · Dilation  is widening of your cervix, also for the baby's passage  Your PHP or obstetrician will examine your cervix for dilation  Your cervix will be fully opened and ready for delivery when it is dilated to 10 centimeters  · Increased discharge  from your vagina may occur  It may be pink, clear, or slightly bloody  This discharge may also be called bloody show  Bloody show is a mucus plug that forms and blocks your cervix during pregnancy  · Rupture of membranes  is a sudden release of clear fluid from your vagina  It is also known as when your water breaks  Your PHP or obstetrician may need to break your water if it does not break on its own  False labor: You may have false labor signs, which are also called Hindman Ornelas contractions  False labor is common and may happen several weeks or days before your actual labor  The contractions are not regular, and do not get closer together  The pain is usually mild, does not worsen, and is felt only in front   Laith Ornelas contractions may happen later in the day, and stop after you change position, walk, or rest   Contact your PHP or obstetrician if:   · You have pain in your lower back or abdomen  · You have bloody mucus or show  · You have questions or concerns about your condition or care  Return to the emergency department if:   · You have regular, painful contractions that are less than 5 minutes apart, and last 30 to 70 seconds each  · You have heavy vaginal bleeding  · You have a constant trickle or sudden gush of clear fluid from your vagina  · You notice a sudden decrease in your baby's movement  © 2014 [address] is for End User's use only and may not be sold, redistributed or otherwise used for commercial purposes  All illustrations and images included in CareNotes® are the copyrighted property of [employer]  or Chapin Vergara  The above information is an  only  It is not intended as medical advice for individual conditions or treatments  Talk to your doctor, nurse or pharmacist before following any medical regimen to see if it is safe and effective for you

## 2020-08-04 NOTE — PROGRESS NOTES
L&D Triage Note - OB/GYN  Beverlie Fearing Peleschak 31 y o  female MRN: [id number]  Unit/Bed#: L&D 324-01 Encounter: [id number]      Assessment:  31 y o   at 39w2d who presents for evaluation of contractions every 20 minutes and diarrhea  Cervical exam was stable at 0 5/50/-3, and tracing was Category I     Plan:  1  Stable for discharge home at this time, with plans to return for planned RLTCS on Wednesday     ______________________________________________________________________      Chief Complaint: Contractions    Subjective:  31 y o  Karma Galiciaerer at 39w2d who comes in complaining of contractions every 20 minutes for the past few hours  She reports cramping pain and stomach hardening that happens every 20 minutes accompanied by diarrhea  She denies vaginal bleeding or leakage of fluid, she denies decreased fetal movement  She denies fevers, chills, chest pain, nausea or vomiting, or issues with constipation  She denies sick contacts  She reports the contractions are stable, not increasing in intensity or frequency  She is a patient of [facility]    This pregnancy is complicated by prior  section with desire for RLTCS, low-lying placenta (2 27cm from os), and obesity (BMI 43)      Objective:  Vitals:    20 2130   BP: 133/74   Pulse: (!) 106   Resp: 18   Temp: 98 3 °F (36 8 °C)     Gen: Appears comfortable, in no acute distress, abdomen soft, nontender    SVE: 0 5 / 50% / -3  FHT:  155 / Moderate 6 - 25 bpm / Accelerations present, no decelerations  Marina: none    Discussed with Dr Phong Hanna MD  8/3/2020 9:56 PM

## 2020-08-05 ENCOUNTER — ANESTHESIA (OUTPATIENT)
Dept: LABOR AND DELIVERY | Facility: HOSPITAL | Age: 30
End: 2020-08-05
Payer: COMMERCIAL

## 2020-08-05 ENCOUNTER — ANESTHESIA EVENT (OUTPATIENT)
Dept: LABOR AND DELIVERY | Facility: HOSPITAL | Age: 30
End: 2020-08-05
Payer: COMMERCIAL

## 2020-08-05 ENCOUNTER — HOSPITAL ENCOUNTER (INPATIENT)
Facility: HOSPITAL | Age: 30
LOS: 2 days | Discharge: HOME | End: 2020-08-07
Attending: OBSTETRICS & GYNECOLOGY | Admitting: OBSTETRICS & GYNECOLOGY
Payer: COMMERCIAL

## 2020-08-05 DIAGNOSIS — Z98.891 STATUS POST REPEAT LOW TRANSVERSE CESAREAN SECTION: Primary | ICD-10-CM

## 2020-08-05 DIAGNOSIS — Z3A.39 39 WEEKS GESTATION OF PREGNANCY: ICD-10-CM

## 2020-08-05 PROBLEM — Z34.90 CURRENTLY PREGNANT: Status: ACTIVE | Noted: 2020-08-05

## 2020-08-05 PROBLEM — R51.9 HEADACHE: Status: ACTIVE | Noted: 2020-08-05

## 2020-08-05 LAB
ABO GROUP BLD: NORMAL
BASE EXCESS BLDCOA CALC-SCNC: -7 MMOL/L (ref 3–11)
BASE EXCESS BLDCOV CALC-SCNC: -9.5 MMOL/L (ref 1–9)
BLD GP AB SCN SERPL QL: NEGATIVE
ERYTHROCYTE [DISTWIDTH] IN BLOOD BY AUTOMATED COUNT: 14.8 % (ref 11.6–15.1)
HCO3 BLDCOA-SCNC: 21.7 MMOL/L (ref 17.3–27.3)
HCO3 BLDCOV-SCNC: 17.8 MMOL/L (ref 12.2–28.6)
HCT VFR BLD AUTO: 39.7 % (ref 34.8–46.1)
HGB BLD-MCNC: 12.9 G/DL (ref 11.5–15.4)
MCH RBC QN AUTO: 27.2 PG (ref 26.8–34.3)
MCHC RBC AUTO-ENTMCNC: 32.5 G/DL (ref 31.4–37.4)
MCV RBC AUTO: 84 FL (ref 82–98)
O2 CT VFR BLDCOA CALC: 7.4 ML/DL
OXYHGB MFR BLDCOA: 34.3 %
OXYHGB MFR BLDCOV: 38.8 %
PCO2 BLDCOA: 56.3 MM[HG] (ref 30–60)
PCO2 BLDCOV: 43.3 MM HG (ref 27–43)
PH BLDCOA: 7.2 [PH] (ref 7.23–7.43)
PH BLDCOV: 7.23 [PH] (ref 7.19–7.49)
PLATELET # BLD AUTO: 280 THOUSANDS/UL (ref 149–390)
PMV BLD AUTO: 10.6 FL (ref 8.9–12.7)
PO2 BLDCOA: 18 MM HG (ref 5–25)
PO2 BLDCOV: 20.4 MM HG (ref 15–45)
RBC # BLD AUTO: 4.75 MILLION/UL (ref 3.81–5.12)
RH BLD: POSITIVE
SAO2 % BLDCOV: 8.3 ML/DL
SPECIMEN EXPIRATION DATE: NORMAL
WBC # BLD AUTO: 7.48 THOUSAND/UL (ref 4.31–10.16)

## 2020-08-05 PROCEDURE — 86592 SYPHILIS TEST NON-TREP QUAL: CPT | Performed by: OBSTETRICS & GYNECOLOGY

## 2020-08-05 PROCEDURE — 59510 CESAREAN DELIVERY: CPT | Performed by: OBSTETRICS & GYNECOLOGY

## 2020-08-05 PROCEDURE — 85027 COMPLETE CBC AUTOMATED: CPT | Performed by: OBSTETRICS & GYNECOLOGY

## 2020-08-05 PROCEDURE — 99024 POSTOP FOLLOW-UP VISIT: CPT | Performed by: OBSTETRICS & GYNECOLOGY

## 2020-08-05 PROCEDURE — 86850 RBC ANTIBODY SCREEN: CPT | Performed by: OBSTETRICS & GYNECOLOGY

## 2020-08-05 PROCEDURE — 86901 BLOOD TYPING SEROLOGIC RH(D): CPT | Performed by: OBSTETRICS & GYNECOLOGY

## 2020-08-05 PROCEDURE — 4A1HXCZ MONITORING OF PRODUCTS OF CONCEPTION, CARDIAC RATE, EXTERNAL APPROACH: ICD-10-PCS | Performed by: OBSTETRICS & GYNECOLOGY

## 2020-08-05 PROCEDURE — 86900 BLOOD TYPING SEROLOGIC ABO: CPT | Performed by: OBSTETRICS & GYNECOLOGY

## 2020-08-05 PROCEDURE — 82805 BLOOD GASES W/O2 SATURATION: CPT | Performed by: OBSTETRICS & GYNECOLOGY

## 2020-08-05 RX ORDER — MORPHINE SULFATE 0.5 MG/ML
INJECTION, SOLUTION EPIDURAL; INTRATHECAL; INTRAVENOUS
Status: COMPLETED
Start: 2020-08-05 | End: 2020-08-05

## 2020-08-05 RX ORDER — DIPHENHYDRAMINE HYDROCHLORIDE 50 MG/ML
25 INJECTION INTRAMUSCULAR; INTRAVENOUS EVERY 6 HOURS PRN
Status: DISCONTINUED | OUTPATIENT
Start: 2020-08-05 | End: 2020-08-06

## 2020-08-05 RX ORDER — ONDANSETRON 2 MG/ML
INJECTION INTRAMUSCULAR; INTRAVENOUS AS NEEDED
Status: DISCONTINUED | OUTPATIENT
Start: 2020-08-05 | End: 2020-08-05

## 2020-08-05 RX ORDER — OXYCODONE HYDROCHLORIDE AND ACETAMINOPHEN 5; 325 MG/1; MG/1
1 TABLET ORAL EVERY 4 HOURS PRN
Status: ACTIVE | OUTPATIENT
Start: 2020-08-05 | End: 2020-08-06

## 2020-08-05 RX ORDER — ONDANSETRON 2 MG/ML
4 INJECTION INTRAMUSCULAR; INTRAVENOUS ONCE AS NEEDED
Status: DISCONTINUED | OUTPATIENT
Start: 2020-08-05 | End: 2020-08-06 | Stop reason: SDUPTHER

## 2020-08-05 RX ORDER — PHENYLEPHRINE HCL IN 0.9% NACL 1 MG/10 ML
SYRINGE (ML) INTRAVENOUS
Status: DISPENSED
Start: 2020-08-05 | End: 2020-08-06

## 2020-08-05 RX ORDER — BUPIVACAINE HYDROCHLORIDE 7.5 MG/ML
INJECTION, SOLUTION INTRASPINAL AS NEEDED
Status: DISCONTINUED | OUTPATIENT
Start: 2020-08-05 | End: 2020-08-05

## 2020-08-05 RX ORDER — SODIUM CHLORIDE, SODIUM LACTATE, POTASSIUM CHLORIDE, CALCIUM CHLORIDE 600; 310; 30; 20 MG/100ML; MG/100ML; MG/100ML; MG/100ML
125 INJECTION, SOLUTION INTRAVENOUS CONTINUOUS
Status: DISCONTINUED | OUTPATIENT
Start: 2020-08-05 | End: 2020-08-07 | Stop reason: HOSPADM

## 2020-08-05 RX ORDER — MORPHINE SULFATE 0.5 MG/ML
INJECTION, SOLUTION EPIDURAL; INTRATHECAL; INTRAVENOUS AS NEEDED
Status: DISCONTINUED | OUTPATIENT
Start: 2020-08-05 | End: 2020-08-05

## 2020-08-05 RX ORDER — FENTANYL CITRATE/PF 50 MCG/ML
25 SYRINGE (ML) INJECTION
Status: DISCONTINUED | OUTPATIENT
Start: 2020-08-05 | End: 2020-08-07 | Stop reason: HOSPADM

## 2020-08-05 RX ORDER — ONDANSETRON 2 MG/ML
4 INJECTION INTRAMUSCULAR; INTRAVENOUS EVERY 8 HOURS PRN
Status: DISCONTINUED | OUTPATIENT
Start: 2020-08-05 | End: 2020-08-07 | Stop reason: HOSPADM

## 2020-08-05 RX ORDER — OXYTOCIN/RINGER'S LACTATE 30/500 ML
62.5 PLASTIC BAG, INJECTION (ML) INTRAVENOUS CONTINUOUS
Status: ACTIVE | OUTPATIENT
Start: 2020-08-05 | End: 2020-08-05

## 2020-08-05 RX ORDER — ACETAMINOPHEN 325 MG/1
975 TABLET ORAL EVERY 6 HOURS PRN
Status: DISCONTINUED | OUTPATIENT
Start: 2020-08-05 | End: 2020-08-07 | Stop reason: HOSPADM

## 2020-08-05 RX ORDER — HYDROMORPHONE HCL/PF 1 MG/ML
1 SYRINGE (ML) INJECTION EVERY 2 HOUR PRN
Status: DISCONTINUED | OUTPATIENT
Start: 2020-08-06 | End: 2020-08-07 | Stop reason: HOSPADM

## 2020-08-05 RX ORDER — KETOROLAC TROMETHAMINE 30 MG/ML
15 INJECTION, SOLUTION INTRAMUSCULAR; INTRAVENOUS EVERY 6 HOURS SCHEDULED
Status: DISCONTINUED | OUTPATIENT
Start: 2020-08-05 | End: 2020-08-07

## 2020-08-05 RX ORDER — SIMETHICONE 80 MG
80 TABLET,CHEWABLE ORAL 4 TIMES DAILY PRN
Status: DISCONTINUED | OUTPATIENT
Start: 2020-08-05 | End: 2020-08-07 | Stop reason: HOSPADM

## 2020-08-05 RX ORDER — OXYCODONE HYDROCHLORIDE AND ACETAMINOPHEN 5; 325 MG/1; MG/1
1 TABLET ORAL EVERY 4 HOURS PRN
Status: DISCONTINUED | OUTPATIENT
Start: 2020-08-06 | End: 2020-08-07 | Stop reason: HOSPADM

## 2020-08-05 RX ORDER — CEFAZOLIN SODIUM 2 G/50ML
2000 SOLUTION INTRAVENOUS ONCE
Status: COMPLETED | OUTPATIENT
Start: 2020-08-05 | End: 2020-08-05

## 2020-08-05 RX ORDER — KETOROLAC TROMETHAMINE 30 MG/ML
15 INJECTION, SOLUTION INTRAMUSCULAR; INTRAVENOUS EVERY 6 HOURS SCHEDULED
Status: DISCONTINUED | OUTPATIENT
Start: 2020-08-05 | End: 2020-08-05 | Stop reason: SDUPTHER

## 2020-08-05 RX ORDER — ACETAMINOPHEN 325 MG/1
650 TABLET ORAL EVERY 6 HOURS PRN
Status: ACTIVE | OUTPATIENT
Start: 2020-08-05 | End: 2020-08-06

## 2020-08-05 RX ORDER — HYDROMORPHONE HCL/PF 1 MG/ML
0.5 SYRINGE (ML) INJECTION
Status: ACTIVE | OUTPATIENT
Start: 2020-08-05 | End: 2020-08-06

## 2020-08-05 RX ORDER — OXYCODONE HYDROCHLORIDE AND ACETAMINOPHEN 5; 325 MG/1; MG/1
2 TABLET ORAL EVERY 4 HOURS PRN
Status: DISCONTINUED | OUTPATIENT
Start: 2020-08-06 | End: 2020-08-07 | Stop reason: HOSPADM

## 2020-08-05 RX ORDER — KETOROLAC TROMETHAMINE 30 MG/ML
INJECTION, SOLUTION INTRAMUSCULAR; INTRAVENOUS AS NEEDED
Status: DISCONTINUED | OUTPATIENT
Start: 2020-08-05 | End: 2020-08-05

## 2020-08-05 RX ORDER — DIPHENHYDRAMINE HYDROCHLORIDE 50 MG/ML
25 INJECTION INTRAMUSCULAR; INTRAVENOUS EVERY 6 HOURS PRN
Status: ACTIVE | OUTPATIENT
Start: 2020-08-05 | End: 2020-08-06

## 2020-08-05 RX ORDER — KETOROLAC TROMETHAMINE 30 MG/ML
30 INJECTION, SOLUTION INTRAMUSCULAR; INTRAVENOUS EVERY 6 HOURS PRN
Status: DISCONTINUED | OUTPATIENT
Start: 2020-08-05 | End: 2020-08-05

## 2020-08-05 RX ORDER — OXYTOCIN/RINGER'S LACTATE 30/500 ML
PLASTIC BAG, INJECTION (ML) INTRAVENOUS CONTINUOUS PRN
Status: DISCONTINUED | OUTPATIENT
Start: 2020-08-05 | End: 2020-08-05

## 2020-08-05 RX ORDER — DOCUSATE SODIUM 100 MG/1
100 CAPSULE, LIQUID FILLED ORAL DAILY PRN
Status: DISCONTINUED | OUTPATIENT
Start: 2020-08-05 | End: 2020-08-07 | Stop reason: HOSPADM

## 2020-08-05 RX ORDER — CALCIUM CARBONATE 200(500)MG
1000 TABLET,CHEWABLE ORAL DAILY PRN
Status: DISCONTINUED | OUTPATIENT
Start: 2020-08-05 | End: 2020-08-07 | Stop reason: HOSPADM

## 2020-08-05 RX ORDER — KETOROLAC TROMETHAMINE 30 MG/ML
30 INJECTION, SOLUTION INTRAMUSCULAR; INTRAVENOUS EVERY 6 HOURS PRN
Status: DISCONTINUED | OUTPATIENT
Start: 2020-08-05 | End: 2020-08-05 | Stop reason: SDUPTHER

## 2020-08-05 RX ORDER — NALOXONE HYDROCHLORIDE 0.4 MG/ML
0.1 INJECTION, SOLUTION INTRAMUSCULAR; INTRAVENOUS; SUBCUTANEOUS
Status: ACTIVE | OUTPATIENT
Start: 2020-08-05 | End: 2020-08-06

## 2020-08-05 RX ORDER — ONDANSETRON 2 MG/ML
4 INJECTION INTRAMUSCULAR; INTRAVENOUS EVERY 4 HOURS PRN
Status: DISPENSED | OUTPATIENT
Start: 2020-08-05 | End: 2020-08-06

## 2020-08-05 RX ADMIN — SODIUM CHLORIDE, SODIUM LACTATE, POTASSIUM CHLORIDE, AND CALCIUM CHLORIDE 125 ML/HR: .6; .31; .03; .02 INJECTION, SOLUTION INTRAVENOUS at 16:44

## 2020-08-05 RX ADMIN — ACETAMINOPHEN 975 MG: 325 TABLET ORAL at 14:52

## 2020-08-05 RX ADMIN — KETOROLAC TROMETHAMINE 30 MG: 30 INJECTION, SOLUTION INTRAMUSCULAR at 14:00

## 2020-08-05 RX ADMIN — SODIUM CHLORIDE, SODIUM LACTATE, POTASSIUM CHLORIDE, AND CALCIUM CHLORIDE: .6; .31; .03; .02 INJECTION, SOLUTION INTRAVENOUS at 13:11

## 2020-08-05 RX ADMIN — CEFAZOLIN SODIUM 2000 MG: 2 SOLUTION INTRAVENOUS at 12:46

## 2020-08-05 RX ADMIN — ONDANSETRON 4 MG: 2 INJECTION INTRAMUSCULAR; INTRAVENOUS at 18:15

## 2020-08-05 RX ADMIN — BUPIVACAINE HYDROCHLORIDE IN DEXTROSE 1.6 ML: 7.5 INJECTION, SOLUTION SUBARACHNOID at 13:16

## 2020-08-05 RX ADMIN — SODIUM CHLORIDE, SODIUM LACTATE, POTASSIUM CHLORIDE, AND CALCIUM CHLORIDE 1000 ML: .6; .31; .03; .02 INJECTION, SOLUTION INTRAVENOUS at 11:22

## 2020-08-05 RX ADMIN — Medication 62.5 MILLI-UNITS/MIN: at 14:30

## 2020-08-05 RX ADMIN — KETOROLAC TROMETHAMINE 15 MG: 30 INJECTION, SOLUTION INTRAMUSCULAR at 20:26

## 2020-08-05 RX ADMIN — TRIMETHOBENZAMIDE HYDROCHLORIDE 200 MG: 100 INJECTION INTRAMUSCULAR at 20:22

## 2020-08-05 RX ADMIN — PHENYLEPHRINE HYDROCHLORIDE 100 MCG: 10 INJECTION INTRAVENOUS at 13:26

## 2020-08-05 RX ADMIN — SODIUM CHLORIDE, SODIUM LACTATE, POTASSIUM CHLORIDE, AND CALCIUM CHLORIDE 1000 ML: .6; .31; .03; .02 INJECTION, SOLUTION INTRAVENOUS at 23:00

## 2020-08-05 RX ADMIN — PHENYLEPHRINE HYDROCHLORIDE 100 MCG: 10 INJECTION INTRAVENOUS at 13:19

## 2020-08-05 RX ADMIN — MORPHINE SULFATE 0.15 MG: 0.5 INJECTION, SOLUTION EPIDURAL; INTRATHECAL; INTRAVENOUS at 13:16

## 2020-08-05 RX ADMIN — ONDANSETRON 4 MG: 2 INJECTION INTRAMUSCULAR; INTRAVENOUS at 13:37

## 2020-08-05 RX ADMIN — Medication 250 MILLI-UNITS/MIN: at 13:36

## 2020-08-05 NOTE — ANESTHESIA PREPROCEDURE EVALUATION
Procedure:   SECTION () REPEAT (N/A Uterus)    Relevant Problems   ANESTHESIA (within normal limits)      CARDIO (within normal limits)      ENDO (within normal limits)      GI/HEPATIC (within normal limits)      /RENAL (within normal limits)      GYN   (+) Currently pregnant      HEMATOLOGY (within normal limits)      MUSCULOSKELETAL (within normal limits)      NEURO/PSYCH   (+) Headache      PULMONARY (within normal limits)      Other   (+) Body mass index (BMI) 40 0-44 9, adult (HCC)   (+) History of  section        Physical Exam    Airway    Mallampati score: I  TM Distance: >3 FB  Neck ROM: full     Dental   No notable dental hx     Cardiovascular  Rate: normal,     Pulmonary  Pulmonary exam normal     Other Findings        Anesthesia Plan  ASA Score- 2     Anesthesia Type- spinal with ASA Monitors  Additional Monitors:   Airway Plan:           Plan Factors-    Chart reviewed  Existing labs reviewed  Patient is not a current smoker  Induction-     Postoperative Plan-     Informed Consent- Anesthetic plan and risks discussed with patient

## 2020-08-05 NOTE — PLAN OF CARE
Problem: PAIN - ADULT  Goal: Verbalizes/displays adequate comfort level or baseline comfort level  Description: Interventions:  - Encourage patient to monitor pain and request assistance  - Assess pain using appropriate pain scale  - Administer analgesics based on type and severity of pain and evaluate response  - Implement non-pharmacological measures as appropriate and evaluate response  - Consider cultural and social influences on pain and pain management  - Notify physician/advanced practitioner if interventions unsuccessful or patient reports new pain  8/5/2020 1200 by Alex Laura  Outcome: Progressing  8/5/2020 1159 by Alex Laura  Outcome: Progressing

## 2020-08-05 NOTE — DISCHARGE SUMMARY
CS Discharge Summary - Jeff Mazariegos 31 y o  female MRN: [id number]    Unit/Bed#: LD OR  Encounter: [id number]    Admission Date: 2020     Discharge Date: 20      Admitting Diagnosis:   Patient Active Problem List   Diagnosis    Allergic rhinitis    Migraine headache    Weight gain    Health care maintenance    Irregular menses    39 weeks gestation of pregnancy    Eustachian tube dysfunction    Obesity, morbid, BMI 40 0-49 9 ([place] [address] )    Obesity affecting pregnancy, antepartum    Body mass index (BMI) 40 0-44 9, adult ([facility] )    Family history of DVT    Low lying placenta nos or without hemorrhage, second trimester    Maternal care due to low transverse uterine scar from previous  delivery    History of  section    Currently pregnant    Headache     Discharge Diagnosis:   Same, delivered    Procedures:   repeat  section, low transverse incision    Admitting Attending: Dr Elsie Goodell  Delivery Attending: Dr Elsie Goodell  Discharge Attending: Dr Kristy Collet service: none  Consult attending: none    Hospital Course:     Jeff Mazariegos is a 31 y o  Liane Enrike who was admitted at 39w4d for scheduled repeat  section  She then underwent an uncomplicated  section delivery and delivered a viable female  at 80  APGARS were 9, 9 at 1 and 5 minutes, respectively   weighed 7lb 1 9oz  Placenta was delivered at 1335   was then transferred to  nursery  Patient tolerated the procedure well and was transferred to recovery in stable condition  The patient's post partum course was  remarkable for gestational HTN  Patient had recorded BP's 140s/90s, but did not required any treatment for severe range blood pressures  Her CBC and CMP were WNL  Preoperative hemaglobin was 12 9, postoperative was 11 9  Her postoperative pain was well controlled with oral analgesics      On day of discharge, she was ambulating and able to reasonably perform all ADLs  She was voiding and had appropriate bowel function  Pain was well controlled  She was discharged home on post-operative day #2 without complications  Patient was instructed to follow up with her OB as an outpatient and was given appropriate warnings to call provider if she develops signs of infection or uncontrolled pain  She is breast feeding   Mom's blood type is A positive  And RhoGAM was not indicated  Complications:   None    Condition at discharge:   good     Provisions for Follow-Up Care:  See after visit summary for information related to follow-up care and any pertinent home health orders  Disposition:   Home    Planned Readmission:   No    Discharge Medications:   Prenatal vitamin daily for 6 months or the duration of nursing whichever is longer  Motrin 600 mg orally every 6 hours as needed for pain  Tylenol (over the counter) per bottle directions as needed for pain  Hydrocortisone cream 1% (over the counter) applied 1-2x daily to hemorrhoids as needed  Witch hazel pads for hemorrhoidal discomfort as needed    Discharge instructions :   -Do not place anything (no partner, tampons or douche) in your vagina for 6 weeks  -You may walk for exercise for the first 6 weeks then gradually return to your usual activities    -Please do not drive for 1 week if you have no stitches and for 2 weeks if you have stitches or underwent a  delivery     -You may take baths or shower per your preference    -Please look at your bust (breasts) in the mirror daily and call provider for redness or tenderness or increased warmth  - If you have had a  please look at your incision daily as well and call provider for increasing redness or steady drainage from the incision    -Please call your provider if temperature > 100 4*F or 38* C, worsening pain or a foul discharge

## 2020-08-05 NOTE — OP NOTE
OPERATIVE REPORT  PATIENT NAME: Esme Johnson    :  1990  MRN: [id number]  Pt Location: [facility] L&D OR ROOM 01     Section Procedure Note    Indications:   Maternal request for repeat  section    Pre-operative Diagnosis:   39w4d pregnancy  Prior  section x1  Obesity   Allergic rhinitis    Post-operative Diagnosis:   RLTCS     Attending: Migdalia Barrera MD  Resident: Jennifer Nielsen DO    Maternal Findings:  Normal uterus  Normal tubes and ovaries bilaterally  Small thin filmy adhesions between the vesicouterine peritoneum and the lower uterine segment  No difficulty noted from skin to delivery     Findings:  Viable female weighing 7lbs 1 9oz;  Apgar scores of 9 at one minute and 9 at five minutes  Clear amniotic fluid  Normal placenta with 3-vessel cord    Arterial and Venous Gases:  Umbilical Cord Venous Blood Gas:  Results from last 7 days   Lab Units 20  1153   PH COV  7 231   PCO2 COV mm HG 43 3*   HCO3 COV mmol/L 17 8   BASE EXC COV mmol/L -9 5*   O2 CT CD VB mL/dL 8 3   O2 HGB, VENOUS CORD % 01 2     Umbilical Cord Arterial Blood Gas:  Results from last 7 days   Lab Units 20  1153   PH COA  7 204*   PCO2 COA  56 3   PO2 COA mm HG 18 0   HCO3 COA mmol/L 21 7   BASE EXC COA mmol/L -7 0*   O2 CONTENT CORD ART ml/dl 7 4   O2 HGB, ARTERIAL CORD % 34 3       Specimens: Arterial and venous cord gases, cord blood, segment of umbilical cord, placenta to storage    Quantitative Blood Loss: pending    Fluids: 2 L LR    Drains: Medeiros catheter           Complications:  None; patient tolerated the procedure well  Disposition: PACU            Condition: stable    Procedure Details   The patient was seen prior to the procedure  Risks, benefits, possible complications, alternate treatment options, and expected outcomes were discussed with the patient  The patient agreed with the proposed plan and gave informed consent for a repeat  section        The patient was taken to the [facility] Operating Room where she received spinal anesthesia  For infection prophylaxis, she received 2g ancef ancef preoperatively  A Medeiros catheter and vaginal prep with Betadine was performed and SCDs were placed  At this point fetal heart tones were obtained and notably in the 120bpm, subsequently the fetal heart tones decrease to the 80s  The abdomen was prepped with Betadine splash  A very brief Time Out was held and the above information confirmed  The patient was identified as Hetal Vazquez and the procedure verified as a  Delivery for repeat  section and urgent due to the deceleration in the fetal heart rate tracing  A Pfannenstiel incision was made and carried down through the underlying subcutaneous tissue to the fascia using a scalpel  The rectus fascia was then nicked in the midline and dissected laterally using Martinez scissors  The superior edge of the  fascial incision was grasped with Kocher clamps bilaterally, tented upward and the underlying rectus muscles were dissected off sharply with Martinez scissors  This was repeated on the inferior edge of the fascia and dissected down to the pubic rami  The rectus muscles were  and the peritoneum was identified, entered, and extended longitudinally with blunt dissection  The bladder blade was inserted  The vesicouterine peritoneum was identified, entered sharply, and dissected laterally with  Metzenbaum scissors to form a bladder flap due to thin filmy vesicouterine adhesions from her prior surgery  The bladder was well out of the way of the lower uterine segment  The bladder blade was repositioned  A low transverse uterine incision was made with the scalpel and extended cephalocaudad with blunt dissection  The amnion was entered bluntly for clear fluid  The fetal head was palpated, elevated, and delivered through the uterine incision followed by the body without difficulty   Time of birth was noted at 1335  There was noted to be spontaneous cry and good tone  There was no apparent injury to the   The umbilical cord was doubly clamped and cut after 30 seconds to allow for delayed cord clamping  The infant was handed off to the  providers  Arterial and venous cord gases, cord blood, and a segment of umbilical cord were obtained for evaluation  The placenta delivered spontaneously at [phone] with uterine fundal massage and appeared normal  The uterus was exteriorized and cleaned out with a moist lap sponge  The uterine incision was closed with a running locked suture of 0 Vicryl  A second layer of the same suture was used to imbricate the first   Hemostasis was noted to be excellent  The uterus was returned to the abdomen  The paracolic gutters were inspected and cleared of all clots and debris with irrigation and moist lap sponges  The fascia was closed with running suture of 0 Vicryl in 2 sutures, meeting midline  Subcutaneous adipose tissue was closed with a running suture of 2-0 vicryl  The skin was closed with a subcuticular running suture of 4-0 Monocryl  Benzoin and steri-strip dressings were applied  The patient appeared to tolerate the procedure very well  Lap sponge, needle, and instrument counts were correct x2  The patient's fundus was palpated and the uterus was expressed  She was then cleaned and transferred to her postpartum recovery room in stable condition and her infant went to the  nursery  Attending Attestation: Dr Nirav Magallon MD was present for the entire procedure      Sae Chambers DO  PGY-4 OB/GYN   2020 2:28 PM

## 2020-08-05 NOTE — H&P
History & Physical - OB/GYN   Sebastian Mediate Peleschak 31 y o  female MRN: [id number]  Unit/Bed#: L&D 329-01 Encounter: [id number]    Kristopher Pride is a patient of [place] complaint:  "I'm ready to have my baby"    HPI:  Kristopher Pride is a 31 y o   female with an MALIA of 2020, by Last Menstrual Period at 39w4d weeks gestation who is being admitted for repeat  section without tubal sterilization  She does not know the gender, this has been an uncomplicated pregnancy  Vitals:    20 1100   BP: 132/84   Pulse: 96   Resp: 18   Temp: 98 2 °F (36 8 °C)       Contractions:  none  Fetal movement:  present  Vaginal bleeding:   none  Leaking of fluid:  none    Pregnancy Complications:  Patient Active Problem List   Diagnosis    Allergic rhinitis    Migraine headache    Weight gain    Health care maintenance    Irregular menses    39 weeks gestation of pregnancy    Eustachian tube dysfunction    Obesity, morbid, BMI 40 0-49 9 ([facility])    Obesity affecting pregnancy, antepartum    Body mass index (BMI) 40 0-44 9, adult ([facility])    Family history of DVT    Low lying placenta nos or without hemorrhage, second trimester    Maternal care due to low transverse uterine scar from previous  delivery    History of  section    Currently pregnant       PMH:  Past Medical History:   Diagnosis Date    Acute sinusitis     24 may 2017 resolved    Breastfeeding (infant)     Dysfunction of eustachian tube     24 may 2017    unspecified laterality    Elevated blood pressure reading     2016  resolved    Gallbladder pain     "sporadic"    Gave birth to child recently     2016    IUD (intrauterine device) in place     2016  resolved    Migraine     Varicella        PSH:  Past Surgical History:   Procedure Laterality Date     SECTION N/A 2016    Procedure:  SECTION ();   Surgeon: Homa Cameron MD;  Location: [facility] LD;  Service:     CHOLECYSTECTOMY      laparoscopic    COLONOSCOPY      EAR SURGERY Bilateral     ear pressure equalization tube bilateral    GALLBLADDER SURGERY      INTRAUTERINE DEVICE INSERTION      MYRINGOTOMY W/ TUBES Left     [place] LAP,CHOLECYSTECTOMY/GRAPH N/A 11/3/2016    Procedure: LAPAROSCOPIC CHOLECYSTECTOMY WITH INTROPERATIVE CHOLANGIOGRAM; LYSIS OF ADHESIONS;  Surgeon: Lina Mantilla MD;  Location: [facility] Main OR;  Service: General    WISDOM TOOTH EXTRACTION         Social Hx:  Denies EtOH, cigarette, and recreational drug use in pregnancy    OB Hx:  OB History    Para Term  AB Living   2 1 1 0 0 1   SAB TAB Ectopic Multiple Live Births   0 0 0 0 1      # Outcome Date GA Lbr Enio/2nd Weight Sex Delivery Anes PTL Lv   2 Current            1 Term 16 39w0d  2994 g (6 lb 9 6 oz) F CS-LTranv Spinal N LORENZO      Name: Viviana Crawford  BEKAH)      Apgar1: 9  Apgar5: 9       Meds:  No current facility-administered medications on file prior to encounter  Current Outpatient Medications on File Prior to Encounter   Medication Sig Dispense Refill    acetaminophen-codeine (TYLENOL #3) 300-30 mg per tablet Take 1 tablet by mouth every 8 (eight) hours as needed (Headache) 30 tablet 0    budesonide (RINOCORT AQUA) 32 MCG/ACT nasal spray 1 spray into each nostril daily 1 Bottle 3    Prenatal Vit-DSS-Fe Cbn-FA (PRENATAL AD PO) Take by mouth         Allergies: Allergies   Allergen Reactions    Cefuroxime GI Intolerance    Rizatriptan Nausea Only       Review of Systems   Constitutional: Negative for fatigue and fever  Respiratory: Negative for cough, shortness of breath and wheezing  Gastrointestinal: Negative for diarrhea, nausea and vomiting  Genitourinary: Negative for flank pain, genital sores, hematuria, vaginal bleeding and vaginal discharge  Musculoskeletal: Positive for back pain  Physical Exam   Constitutional: She is oriented to person, place, and time   She appears well-developed  HENT:   Head: Normocephalic and atraumatic  Nose: Nose normal    Eyes: Pupils are equal, round, and reactive to light  Neck: Normal range of motion  No JVD present  Cardiovascular: Normal rate, regular rhythm and normal heart sounds  Exam reveals no gallop and no friction rub  No murmur heard  Pulmonary/Chest: Effort normal and breath sounds normal  No stridor  No respiratory distress  She has no wheezes  She has no rales  Abdominal: Soft  Bowel sounds are normal  She exhibits no distension  There is no abdominal tenderness  There is no rebound and no guarding  gravid   Musculoskeletal: Normal range of motion  Neurological: She is alert and oriented to person, place, and time  Skin: Skin is warm and dry  No rash noted  No pallor  Psychiatric: Her behavior is normal  Judgment and thought content normal    Vitals reviewed        Cervix:   deferred    Fetal heart rate:    125bpm/moderate variability/reactive 15 x 15 accelerations/no decelerations    [place]:    n/a    EFW: 7 lb     Membranes: intact    Labs:  Hemoglobin: 12 9  Blood type: A+  Antibody: negative  Group B strep: negative  HIV: negative  Hepatitis B: negative  RPR: non-reactive   Rubella: Immune  Varicella Unknown  1 hour Glucose: normal 121    Assessment:   31 y o   at 39w4d admit for scheduled repeat  section    Plan:   IUP at 39w4d   - Intermittent FHR and tocometry monitoring  Ancef 2g for infection ppx  Routine antepartum labs   - CBC, RPR, T&S stat  Analgesia   - Spinal per anesthesia  FEN   - NPO, IV LR for hydration    D/w Dr Christiano Carter DO  PGY-4 OB/GYN   2020 12:42 PM

## 2020-08-05 NOTE — LACTATION NOTE
This note was copied from a baby's chart  CONSULT - LACTATION  Baby Girl Enis Magnuson) Peleschak 0 days female MRN: [id number]    [address] [facility] NURSERY Room / Bed: L&D 314(N)/L&D 314(N) Encounter: [id number]    Maternal Information     MOTHER:  Jacek Nunez  Maternal Age: 31 y o    OB History: # 1 - Date: 16, Sex: Female, Weight: 2994 g (6 lb 9 6 oz), GA: 39w0d, Delivery: , Low Transverse, Apgar1: 9, Apgar5: 9, Living: Living, Birth Comments: None    # 2 - Date: None, Sex: None, Weight: None, GA: None, Delivery: None, Apgar1: None, Apgar5: None, Living: None, Birth Comments: None   Previouse breast reduction surgery? No    Lactation history:   Has patient previously breast fed: Yes   How long had patient previously breast fed: 8 months   Previous breast feeding complications: Exclusive pump and bottle fed, Other (Comment)(could not latch baby;pumped & fed)     Past Surgical History:   Procedure Laterality Date     SECTION N/A 2016    Procedure:  SECTION ();   Surgeon: Migdalia Barrera MD;  Location: [facility];  Service:     CHOLECYSTECTOMY      laparoscopic    COLONOSCOPY      EAR SURGERY Bilateral     ear pressure equalization tube bilateral    GALLBLADDER SURGERY      INTRAUTERINE DEVICE INSERTION      MYRINGOTOMY W/ TUBES Left     [place] LAP,CHOLECYSTECTOMY/GRAPH N/A 11/3/2016    Procedure: LAPAROSCOPIC CHOLECYSTECTOMY WITH INTROPERATIVE CHOLANGIOGRAM; LYSIS OF ADHESIONS;  Surgeon: Sissy Saleh MD;  Location: [facility] OR;  Service: General    WISDOM TOOTH EXTRACTION          Birth information:  YOB: 2021   Time of birth: 1:35 PM   Sex: female   Delivery type: , Low Transverse   Birth Weight: 3230 g (7 lb 1 9 oz)   Percent of Weight Change: 0%     Gestational Age: 43w3d   [unfilled]    Assessment     Breast and nipple assessment: flat nipple    [place] Assessment: normal assessment    Feeding assessment: feeding well  LATCH:  Latch: Grasps breast, tongue down, lips flanged, rhythmic sucking(after multiple attempts)   Audible Swallowing: Spontaneous and intermittent (24 hours old)(stimulate till suckling well)   Type of Nipple: Flat(does pull out with latch assist & after baby suckling well)   Comfort (Breast/Nipple): Soft/non-tender   Hold (Positioning): Partial assist, teach one side, mother does other, staff holds   [facility] Score: 8          Feeding recommendations:  breast feed on demand     Met with mother and father  Provided mother with Ready, Set, Baby booklet  Discussed Skin to Skin contact an benefits to mom and baby  Talked about the delay of the first bath until baby has adjusted  Spoke about the benefits of rooming in  Feeding on cue and what that means for recognizing infant's hunger  Avoidance of pacifiers for the first month discussed  Talked about exclusive breastfeeding for the first 6 months  Positioning and latch reviewed as well as showing images of other feeding positions  Baby very hungry and did not easily settle with hand expression, skin to skin  Did not tolerate football well  Allowed baby total control with laid back position  Used latch assist to bring down lots of colostrum  Deep latch on right breast after multiple attempts till popped off  Burped  Placed on left breast also allowing laid back position after a few attempts deep latch till asleep at breast  Crying during transfer to [facility] but settled quickly  Mom pleased to see baby suckle well at breast  Noted comfort of nipples, suckling of baby and relaxed tone after feed  Discussed the properties of a good latch in any position  Reviewed hand/manual expression  Discussed s/s that baby is getting enough milk and some s/s that breastfeeding dyad may need further help  Gave information on common concerns, what to expect the first few weeks after delivery, preparing for other caregivers, and how partners can help   Resources for support also provided  Dad supportive at bedside  Encouraged parents to call for assistance, questions, and concerns about breastfeeding  Extension provided        Ruba Concepcion RN 8/5/2020 6:11 PM

## 2020-08-05 NOTE — ANESTHESIA PROCEDURE NOTES
Spinal Block    Patient location during procedure: OR  Start time: 8/5/2020 1:16 PM  Reason for block: primary anesthetic  Staffing  Anesthesiologist: Gunjan Rivera MD  Performed: anesthesiologist   Preanesthetic Checklist  Completed: patient identified, site marked, surgical consent, pre-op evaluation, timeout performed, IV checked, risks and benefits discussed and monitors and equipment checked  Spinal Block  Patient position: sitting  Prep: Betadine  Patient monitoring: frequent blood pressure checks  Approach: midline  Location: L3-4  Injection technique: single-shot  Needle  Needle type: pencil-tip   Needle gauge: 24 G  Needle length: 10 cm  Assessment  Sensory level: T4  Injection Assessment:  negative aspiration for heme, no paresthesia on injection and positive aspiration for clear CSF

## 2020-08-06 LAB
BASOPHILS # BLD AUTO: 0.02 THOUSANDS/ΜL (ref 0–0.1)
BASOPHILS NFR BLD AUTO: 0 % (ref 0–1)
EOSINOPHIL # BLD AUTO: 0.02 THOUSAND/ΜL (ref 0–0.61)
EOSINOPHIL NFR BLD AUTO: 0 % (ref 0–6)
ERYTHROCYTE [DISTWIDTH] IN BLOOD BY AUTOMATED COUNT: 14.6 % (ref 11.6–15.1)
HCT VFR BLD AUTO: 36.5 % (ref 34.8–46.1)
HGB BLD-MCNC: 11.9 G/DL (ref 11.5–15.4)
IMM GRANULOCYTES # BLD AUTO: 0.03 THOUSAND/UL (ref 0–0.2)
IMM GRANULOCYTES NFR BLD AUTO: 0 % (ref 0–2)
LYMPHOCYTES # BLD AUTO: 1.56 THOUSANDS/ΜL (ref 0.6–4.47)
LYMPHOCYTES NFR BLD AUTO: 19 % (ref 14–44)
MCH RBC QN AUTO: 27.8 PG (ref 26.8–34.3)
MCHC RBC AUTO-ENTMCNC: 32.6 G/DL (ref 31.4–37.4)
MCV RBC AUTO: 85 FL (ref 82–98)
MONOCYTES # BLD AUTO: 0.53 THOUSAND/ΜL (ref 0.17–1.22)
MONOCYTES NFR BLD AUTO: 6 % (ref 4–12)
NEUTROPHILS # BLD AUTO: 6.17 THOUSANDS/ΜL (ref 1.85–7.62)
NEUTS SEG NFR BLD AUTO: 75 % (ref 43–75)
NRBC BLD AUTO-RTO: 0 /100 WBCS
PLATELET # BLD AUTO: 219 THOUSANDS/UL (ref 149–390)
PMV BLD AUTO: 10.3 FL (ref 8.9–12.7)
RBC # BLD AUTO: 4.28 MILLION/UL (ref 3.81–5.12)
RPR SER QL: NORMAL
WBC # BLD AUTO: 8.33 THOUSAND/UL (ref 4.31–10.16)

## 2020-08-06 PROCEDURE — 85025 COMPLETE CBC W/AUTO DIFF WBC: CPT | Performed by: OBSTETRICS & GYNECOLOGY

## 2020-08-06 PROCEDURE — 99024 POSTOP FOLLOW-UP VISIT: CPT | Performed by: OBSTETRICS & GYNECOLOGY

## 2020-08-06 RX ORDER — DIPHENHYDRAMINE HCL 25 MG
25 TABLET ORAL EVERY 6 HOURS PRN
Status: DISCONTINUED | OUTPATIENT
Start: 2020-08-06 | End: 2020-08-07 | Stop reason: HOSPADM

## 2020-08-06 RX ADMIN — DOCUSATE SODIUM 100 MG: 100 CAPSULE, LIQUID FILLED ORAL at 09:22

## 2020-08-06 RX ADMIN — KETOROLAC TROMETHAMINE 15 MG: 30 INJECTION, SOLUTION INTRAMUSCULAR at 21:05

## 2020-08-06 RX ADMIN — KETOROLAC TROMETHAMINE 30 MG: 30 INJECTION, SOLUTION INTRAMUSCULAR at 09:23

## 2020-08-06 RX ADMIN — DOCUSATE SODIUM 100 MG: 100 CAPSULE, LIQUID FILLED ORAL at 18:29

## 2020-08-06 RX ADMIN — ENOXAPARIN SODIUM 40 MG: 40 INJECTION SUBCUTANEOUS at 09:22

## 2020-08-06 RX ADMIN — ENOXAPARIN SODIUM 40 MG: 40 INJECTION SUBCUTANEOUS at 21:10

## 2020-08-06 RX ADMIN — KETOROLAC TROMETHAMINE 15 MG: 30 INJECTION, SOLUTION INTRAMUSCULAR at 16:18

## 2020-08-06 RX ADMIN — SODIUM CHLORIDE, SODIUM LACTATE, POTASSIUM CHLORIDE, AND CALCIUM CHLORIDE 125 ML/HR: .6; .31; .03; .02 INJECTION, SOLUTION INTRAVENOUS at 01:47

## 2020-08-06 RX ADMIN — KETOROLAC TROMETHAMINE 15 MG: 30 INJECTION, SOLUTION INTRAMUSCULAR at 03:07

## 2020-08-06 NOTE — PROGRESS NOTES
Patient was seen for postop check  She did have some lightheadedness and dizziness along with vomiting  She was given Tigan intramuscular injection as recommended by anesthesia with significant improvement of symptoms  She did have headache but this has resolved as well  Vital signs are stable  Urine output-500 cc total, with 50+ 200 cc noted since OR (case ended approximately 14 30, 8 hours ago)  Plan-follow closely  -IV fluid bolus  -follow urine output  If any concern about urine output, to check CBC and BMP  -if reassuring urine output, to check CBC in a naina [facility]

## 2020-08-06 NOTE — LACTATION NOTE
This note was copied from a baby's chart  Spoke with mom about breastfeeding  She verb baby is latching, but gets sleepy at the breast and doesn't feed well  I reassured her that sleepiness is normal in the first day or two, but enc her to call for assistance as needed, phone # provided

## 2020-08-06 NOTE — PLAN OF CARE
Problem: PAIN - ADULT  Goal: Verbalizes/displays adequate comfort level or baseline comfort level  Description: Interventions:  - Encourage patient to monitor pain and request assistance  - Assess pain using appropriate pain scale  - Administer analgesics based on type and severity of pain and evaluate response  - Implement non-pharmacological measures as appropriate and evaluate response  - Consider cultural and social influences on pain and pain management  - Notify physician/advanced practitioner if interventions unsuccessful or patient reports new pain  Outcome: Progressing     Problem: INFECTION - ADULT  Goal: Absence or prevention of progression during hospitalization  Description: INTERVENTIONS:  - Assess and monitor for signs and symptoms of infection  - Monitor lab/diagnostic results  - Monitor all insertion sites, i e  indwelling lines, tubes, and drains  - Monitor endotracheal if appropriate and nasal secretions for changes in amount and color  - [place] appropriate cooling/warming therapies per order  - Administer medications as ordered  - Instruct and encourage patient and family to use good hand hygiene technique  - Identify and instruct in appropriate isolation precautions for identified infection/condition  Outcome: Progressing  Goal: Absence of fever/infection during neutropenic period  Description: INTERVENTIONS:  - Monitor WBC    Outcome: Progressing     Problem: SAFETY ADULT  Goal: Patient will remain free of falls  Description: INTERVENTIONS:  - Assess patient frequently for physical needs  -  Identify cognitive and physical deficits and behaviors that affect risk of falls    -  [place] fall precautions as indicated by assessment   - Educate patient/family on patient safety including physical limitations  - Instruct patient to call for assistance with activity based on assessment  - Modify environment to reduce risk of injury  - Consider OT/PT consult to assist with strengthening/mobility  Outcome: Progressing  Goal: Maintain or return to baseline ADL function  Description: INTERVENTIONS:  -  Assess patient's ability to carry out ADLs; assess patient's baseline for ADL function and identify physical deficits which impact ability to perform ADLs (bathing, care of mouth/teeth, toileting, grooming, dressing, etc )  - Assess/evaluate cause of self-care deficits   - Assess range of motion  - Assess patient's mobility; develop plan if impaired  - Assess patient's need for assistive devices and provide as appropriate  - Encourage maximum independence but intervene and supervise when necessary  - Involve family in performance of ADLs  - Assess for home care needs following discharge   - Consider OT consult to assist with ADL evaluation and planning for discharge  - Provide patient education as appropriate  Outcome: Progressing  Goal: Maintain or return mobility status to optimal level  Description: INTERVENTIONS:  - Assess patient's baseline mobility status (ambulation, transfers, stairs, etc )    - Identify cognitive and physical deficits and behaviors that affect mobility  - Identify mobility aids required to assist with transfers and/or ambulation (gait belt, sit-to-stand, lift, walker, cane, etc )  - [place] fall precautions as indicated by assessment  - Record patient progress and toleration of activity level on Mobility SBAR; progress patient to next Phase/Stage  - Instruct patient to call for assistance with activity based on assessment  - Consider rehabilitation consult to assist with strengthening/weightbearing, etc   Outcome: Progressing     Problem: Knowledge Deficit  Goal: Patient/family/caregiver demonstrates understanding of disease process, treatment plan, medications, and discharge instructions  Description: Complete learning assessment and assess knowledge base    Interventions:  - Provide teaching at level of understanding  - Provide teaching via preferred learning methods  Outcome: Progressing     Problem: DISCHARGE PLANNING  Goal: Discharge to home or other facility with appropriate resources  Description: INTERVENTIONS:  - Identify barriers to discharge w/patient and caregiver  - Arrange for needed discharge resources and transportation as appropriate  - Identify discharge learning needs (meds, wound care, etc )  - Arrange for interpretive services to assist at discharge as needed  - Refer to Case Management Department for coordinating discharge planning if the patient needs post-hospital services based on physician/advanced practitioner order or complex needs related to functional status, cognitive ability, or social support system  Outcome: Progressing     Problem: POSTPARTUM  Goal: Experiences normal postpartum course  Description: INTERVENTIONS:  - Monitor maternal vital signs  - Assess uterine involution and lochia  Outcome: Progressing  Goal: Appropriate maternal -  bonding  Description: INTERVENTIONS:  - Identify family support  - Assess for appropriate maternal/infant bonding   -Encourage maternal/infant bonding opportunities  - Referral to  or  as needed  Outcome: Progressing  Goal: Establishment of infant feeding pattern  Description: INTERVENTIONS:  - Assess breast/bottle feeding  - Refer to lactation as needed  Outcome: Progressing  Goal: Incision(s), wounds(s) or drain site(s) healing without S/S of infection  Description: INTERVENTIONS  - Assess and document risk factors for skin impairment   - Assess and document dressing, incision, wound bed, drain sites and surrounding tissue  - Consider nutrition services referral as needed  - Oral mucous membranes remain intact  - Provide patient/ family education  Outcome: Progressing

## 2020-08-06 NOTE — PROGRESS NOTES
Progress Note - OB/GYN   Sebastian Mediate Peleschak 31 y o  female MRN: [id number]  Unit/Bed#: L&D 538-50 Encounter: [id number]      Kristopher Pride is a patient of [facility]    Assessment:  Post operative day #1 s/p RLTCS, stable, and doing well this morning without complaints    Plan:  1  Hemodynamically stable   - Pre-op Hb 12 9 --> post-op Hb 11 9   - Vitals WNL, currently asymptomatic  2  Medeiros catheter   - Removed this am   - Making adequate urine output   - F/u voiding trial today  3  Continue routine post partum care   - Encourage ambulation   - Encourage breastfeeding  4  Continue current meds   - See list below   - Pain adequately controlled with PO analgesics  5  Future contraception   - Pt desires Mirena IUD in the office  6  Disposition   - Stable   - Anticipate discharge home tomorrow as patient desires early discharge if possible    Subjective/Objective     Chief Complaint:     None    Subjective:     Pain: no  Tolerating Oral Intake: yes  Voiding: yes  Flatus: yes  Bowel Movement: no  Ambulating: yes  Breastfeeding: Breastfeeding  Chest Pain: no  Shortness of Breath: no  Leg Pain/Discomfort: no  Lochia: minimal    Objective:   Vitals:   BP (P) 127/85 (BP Location: Left arm)   Pulse (P) 64   Temp (P) 98 8 °F (37 1 °C) (Oral)   Resp (P) 18   Ht 5' 5" (1 651 m)   Wt 118 kg (260 lb)   LMP 11/02/2019 (Exact Date)   SpO2 (P) 98%   Breastfeeding Yes   BMI 43 27 kg/m²   Body mass index is 43 27 kg/m²  I/O       08/04 0701 - 08/05 0700 08/05 0701 - 08/06 0700 08/06 0701 - 08/07 0700    P  O   100     I V  (mL/kg)  1585 4 (13 4)     Total Intake(mL/kg)  1685 4 (14 3)     Urine (mL/kg/hr)  1000     Emesis/NG output  0     Blood  708     Total Output  1708     Net  -22 6            Unmeasured Emesis Occurrence  1 x         Lab Results   Component Value Date    WBC 8 33 08/06/2020    HGB 11 9 08/06/2020    HCT 36 5 08/06/2020    MCV 85 08/06/2020     08/06/2020       Meds/Allergies     Physical Exam:  General: in no apparent distress, well developed and well nourished and non-toxic  Cardiovascular: Cor RRR  Lungs: clear to auscultation bilaterally  Abdomen: abdomen is soft without significant tenderness, masses, organomegaly or guarding; incision c/d/i closed with running absorbable suture  Fundus: Firm and non-tender, 1 cm above the umbilicus  Lower extremeties: nontender, SCDs in place bilaterally    Labs/Tests:   Recent Results (from the past 24 hour(s))   CBC    Collection Time: 08/05/20 11:24 AM   Result Value Ref Range    WBC 7 48 4 31 - 10 16 Thousand/uL    RBC 4 75 3 81 - 5 12 Million/uL    Hemoglobin 12 9 11 5 - 15 4 g/dL    Hematocrit 39 7 34 8 - 46 1 %    MCV 84 82 - 98 fL    MCH 27 2 26 8 - 34 3 pg    MCHC 32 5 31 4 - 37 4 g/dL    RDW 14 8 11 6 - 15 1 %    Platelets 977 448 - 893 Thousands/uL    MPV 10 6 8 9 - 12 7 fL   Type and screen    Collection Time: 08/05/20 11:50 AM   Result Value Ref Range    ABO Grouping A     Rh Factor Positive     Antibody Screen Negative     Specimen Expiration Date [id number]    Blood gas, venous, cord    Collection Time: 08/05/20 11:53 AM   Result Value Ref Range    pH, Cord Dom 7 231 7 190 - 7 490    pCO2, Cord Dom 43 3 (H) 27 0 - 43 0 mm HG    pO2, Cord Dom 20 4 15 0 - 45 0 mm HG    HCO3, Cord Dom 17 8 12 2 - 28 6 mmol/L    Base Exc, Cord Dom -9 5 (L) 1 0 - 9 0 mmol/L    O2 Cont, Cord Dom 8 3 mL/dL    O2 HGB,VENOUS CORD 38 8 %   Blood gas, arterial, cord    Collection Time: 08/05/20 11:53 AM   Result Value Ref Range    pH, Cord Art 7 204 (L) 7 230 - 7 430    pCO2, Cord Art 56 3 30 0 - 60 0    pO2, Cord Art 18 0 5 0 - 25 0 mm HG    HCO3, Cord Art 21 7 17 3 - 27 3 mmol/L    Base Exc, Cord Art -7 0 (L) 3 0 - 11 0 mmol/L    O2 Content, Cord Art 7 4 ml/dl    O2 Hgb, Arterial Cord 34 3 %   CBC and differential    Collection Time: 08/06/20  3:16 AM   Result Value Ref Range    WBC 8 33 4 31 - 10 16 Thousand/uL    RBC 4 28 3 81 - 5 12 Million/uL    Hemoglobin 11 9 11 5 - 15 4 g/dL    Hematocrit 36 5 34 8 - 46 1 %    MCV 85 82 - 98 fL    MCH 27 8 26 8 - 34 3 pg    MCHC 32 6 31 4 - 37 4 g/dL    RDW 14 6 11 6 - 15 1 %    MPV 10 3 8 9 - 12 7 fL    Platelets 418 619 - 103 Thousands/uL    nRBC 0 /100 WBCs    Neutrophils Relative 75 43 - 75 %    Immat GRANS % 0 0 - 2 %    Lymphocytes Relative 19 14 - 44 %    Monocytes Relative 6 4 - 12 %    Eosinophils Relative 0 0 - 6 %    Basophils Relative 0 0 - 1 %    Neutrophils Absolute 6 17 1 85 - 7 62 Thousands/µL    Immature Grans Absolute 0 03 0 00 - 0 20 Thousand/uL    Lymphocytes Absolute 1 56 0 60 - 4 47 Thousands/µL    Monocytes Absolute 0 53 0 17 - 1 22 Thousand/µL    Eosinophils Absolute 0 02 0 00 - 0 61 Thousand/µL    Basophils Absolute 0 02 0 00 - 0 10 Thousands/µL       MEDS:   Current Facility-Administered Medications   Medication Dose Route Frequency    acetaminophen (TYLENOL) tablet 650 mg  650 mg Oral Q6H PRN    acetaminophen (TYLENOL) tablet 975 mg  975 mg Oral Q6H PRN    calcium carbonate (TUMS) chewable tablet 1,000 mg  1,000 mg Oral Daily PRN    diphenhydrAMINE (BENADRYL) injection 25 mg  25 mg Intravenous Q6H PRN    diphenhydrAMINE (BENADRYL) injection 25 mg  25 mg Intravenous Q6H PRN    docusate sodium (COLACE) capsule 100 mg  100 mg Oral Daily PRN    enoxaparin (LOVENOX) subcutaneous injection 40 mg  40 mg Subcutaneous Q12H [facility]    fentaNYL (SUBLIMAZE) injection 25 mcg  25 mcg Intravenous Q5 Min PRN    HYDROmorphone (DILAUDID) injection 0 5 mg  0 5 mg Intravenous Q3H PRN    HYDROmorphone (DILAUDID) injection 1 mg  1 mg Intravenous Q2H PRN    ketorolac (TORADOL) injection 15 mg  15 mg Intravenous Q6H [facility]    lactated ringers infusion  125 mL/hr Intravenous Continuous    lactated ringers infusion  125 mL/hr Intravenous Continuous    naloxone (NARCAN) injection 0 1 mg  0 1 mg Intravenous Q3 min PRN    ondansetron (ZOFRAN) injection 4 mg  4 mg Intravenous Once PRN    ondansetron (ZOFRAN) injection 4 mg  4 mg Intravenous Q4H PRN    ondansetron (ZOFRAN) injection 4 mg  4 mg Intravenous Q8H PRN    oxyCODONE-acetaminophen (PERCOCET) 5-325 mg per tablet 1 tablet  1 tablet Oral Q4H PRN    oxyCODONE-acetaminophen (PERCOCET) 5-325 mg per tablet 1 tablet  1 tablet Oral Q4H PRN    oxyCODONE-acetaminophen (PERCOCET) 5-325 mg per tablet 2 tablet  2 tablet Oral Q4H PRN    simethicone (MYLICON) chewable tablet 80 mg  80 mg Oral 4x Daily PRN     Invasive Devices     Peripheral Intravenous Line            Peripheral IV 08/05/20 Right Wrist less than 1 day          Drain            Urethral Catheter less than 1 day                Melvin Ghotra DO  PGY-4 OB/GYN   8/6/2020 7:58 AM

## 2020-08-06 NOTE — LACTATION NOTE
This note was copied from a baby's chart  Assisted mom with breastfeeding  Mom had baby in the cradle position and was supporting her breast from the top  I suggested we try the cross cradle, so she has better control of the baby's head  I demo  to her how to support her breast like a "C"  We attempted this way for about 10 minutes  Baby gets  A little frustrated when we are attempting  Mom has flatter nipples  I then suggested we try the football hold and mom used the latch assist to bring out her nipple  I then helped her get the baby to latch and baby latched well and fed for 15 minutes with audible swallows   Enc to call for asst as needed,phone # given

## 2020-08-07 VITALS
DIASTOLIC BLOOD PRESSURE: 92 MMHG | RESPIRATION RATE: 18 BRPM | BODY MASS INDEX: 43.32 KG/M2 | TEMPERATURE: 98.7 F | SYSTOLIC BLOOD PRESSURE: 138 MMHG | HEIGHT: 65 IN | OXYGEN SATURATION: 99 % | WEIGHT: 260 LBS | HEART RATE: 80 BPM

## 2020-08-07 PROBLEM — O13.9 GESTATIONAL HYPERTENSION: Status: ACTIVE | Noted: 2020-08-07

## 2020-08-07 LAB
ALBUMIN SERPL BCP-MCNC: 2.1 G/DL (ref 3.5–5)
ALP SERPL-CCNC: 156 U/L (ref 46–116)
ALT SERPL W P-5'-P-CCNC: 15 U/L (ref 12–78)
ANION GAP SERPL CALCULATED.3IONS-SCNC: 9 MMOL/L (ref 4–13)
AST SERPL W P-5'-P-CCNC: 20 U/L (ref 5–45)
BILIRUB SERPL-MCNC: 0.14 MG/DL (ref 0.2–1)
BUN SERPL-MCNC: 13 MG/DL (ref 5–25)
CALCIUM SERPL-MCNC: 9.3 MG/DL (ref 8.3–10.1)
CHLORIDE SERPL-SCNC: 105 MMOL/L (ref 100–108)
CO2 SERPL-SCNC: 25 MMOL/L (ref 21–32)
CREAT SERPL-MCNC: 0.69 MG/DL (ref 0.6–1.3)
ERYTHROCYTE [DISTWIDTH] IN BLOOD BY AUTOMATED COUNT: 14.9 % (ref 11.6–15.1)
GFR SERPL CREATININE-BSD FRML MDRD: 116 ML/MIN/1.73SQ M
GLUCOSE SERPL-MCNC: 94 MG/DL (ref 65–140)
HCT VFR BLD AUTO: 33.4 % (ref 34.8–46.1)
HGB BLD-MCNC: 10.7 G/DL (ref 11.5–15.4)
MCH RBC QN AUTO: 27.6 PG (ref 26.8–34.3)
MCHC RBC AUTO-ENTMCNC: 32 G/DL (ref 31.4–37.4)
MCV RBC AUTO: 86 FL (ref 82–98)
PLATELET # BLD AUTO: 206 THOUSANDS/UL (ref 149–390)
PMV BLD AUTO: 10.7 FL (ref 8.9–12.7)
POTASSIUM SERPL-SCNC: 3.5 MMOL/L (ref 3.5–5.3)
PROT SERPL-MCNC: 6.3 G/DL (ref 6.4–8.2)
RBC # BLD AUTO: 3.88 MILLION/UL (ref 3.81–5.12)
SODIUM SERPL-SCNC: 139 MMOL/L (ref 136–145)
WBC # BLD AUTO: 8.08 THOUSAND/UL (ref 4.31–10.16)

## 2020-08-07 PROCEDURE — 99024 POSTOP FOLLOW-UP VISIT: CPT | Performed by: OBSTETRICS & GYNECOLOGY

## 2020-08-07 PROCEDURE — 85027 COMPLETE CBC AUTOMATED: CPT | Performed by: OBSTETRICS & GYNECOLOGY

## 2020-08-07 PROCEDURE — 80053 COMPREHEN METABOLIC PANEL: CPT | Performed by: OBSTETRICS & GYNECOLOGY

## 2020-08-07 RX ORDER — IBUPROFEN 600 MG/1
600 TABLET ORAL EVERY 6 HOURS PRN
Qty: 50 TABLET | Refills: 0 | Status: SHIPPED | OUTPATIENT
Start: 2020-08-07 | End: 2022-07-06

## 2020-08-07 RX ORDER — MEDROXYPROGESTERONE ACETATE 150 MG/ML
150 INJECTION, SUSPENSION INTRAMUSCULAR ONCE
Status: DISCONTINUED | OUTPATIENT
Start: 2020-08-07 | End: 2020-08-07

## 2020-08-07 RX ORDER — IBUPROFEN 600 MG/1
600 TABLET ORAL EVERY 6 HOURS PRN
Status: DISCONTINUED | OUTPATIENT
Start: 2020-08-07 | End: 2020-08-07 | Stop reason: HOSPADM

## 2020-08-07 RX ADMIN — ENOXAPARIN SODIUM 40 MG: 40 INJECTION SUBCUTANEOUS at 08:59

## 2020-08-07 RX ADMIN — DOCUSATE SODIUM 100 MG: 100 CAPSULE, LIQUID FILLED ORAL at 08:59

## 2020-08-07 RX ADMIN — IBUPROFEN 600 MG: 600 TABLET, FILM COATED ORAL at 04:21

## 2020-08-07 NOTE — PROGRESS NOTES
Progress Note - OB/GYN   Nunzio Hope Peleschak 31 y o  female MRN: [id number]  Unit/Bed#: L&D 800-74 Encounter: [id number]      Brenna King is a patient of [facility]    Assessment:  Post operative day #2 s/p RLTCS, stable, and doing well    Plan:  1  Hemodynamically stable   - Pre-op Hb 12 9 --> post-op Hb 11 9 --> 10 7   - Currently asymptomatic  2  Gestational hypertension   - Patient met criteria upon admission   - CMP, platelets WNL   - Patient has not required treatment for SRBP   - 140-146/88-95 overnight   - Asymptomatic  3  Making adequate urine output  4  DVT ppx   - BMI 43 27   - Lovenox 40mg BID  5  Continue routine post partum care   - Encourage ambulation   - Encourage breastfeeding  6  Continue current meds   - See list below   - Pain adequately controlled with PO analgesics  7  Future contraception   - Mirena outpatient   8  Disposition   - Stable   - Requesting early discharge home today, postop day #2    Subjective/Objective     Chief Complaint:     None    Subjective:     Pain: mild, controlled with motrin  Tolerating Oral Intake: yes  Voiding: yes  Flatus: yes  Bowel Movement: no  Ambulating: yes  Breastfeeding: Breastfeeding  Chest Pain: no  Shortness of Breath: no  Leg Pain/Discomfort: no  Lochia: minimal    Objective:   Vitals:   /95 (BP Location: Left arm)   Pulse 83   Temp 98 9 °F (37 2 °C) (Oral)   Resp 18   Ht 5' 5" (1 651 m)   Wt 118 kg (260 lb)   LMP 11/02/2019 (Exact Date)   SpO2 99%   Breastfeeding Yes   BMI 43 27 kg/m²   Body mass index is 43 27 kg/m²  I/O       08/05 0701 - 08/06 0700 08/06 0701 - 08/07 0700    P  O  100     I V  (mL/kg) 1585 4 (13 4)     Total Intake(mL/kg) 1685 4 (14 3)     Urine (mL/kg/hr) 1000 2650 (0 9)    Emesis/NG output 0     Blood 708     Total Output 1708 2650    Net -22 6 -2650          Unmeasured Emesis Occurrence 1 x         Lab Results   Component Value Date    WBC 8 08 08/07/2020    HGB 10 7 (L) 08/07/2020    HCT 33 4 (L) 08/07/2020    MCV 86 08/07/2020     08/07/2020       Meds/Allergies     Physical Exam:  General: in no apparent distress and well developed and well nourished  Cardiovascular: Cor RRR  Lungs: clear to auscultation bilaterally  Abdomen: abdomen is soft without significant tenderness, masses, organomegaly or guarding; incision c/d/i closed with running absorbable suture  Fundus: Firm and non-tender, 1 cm below the umbilicus  Lower extremeties: nontender, SCDs in place bilaterally    Labs/Tests:   Recent Results (from the past 24 hour(s))   CBC and Platelet    Collection Time: 08/07/20 12:10 AM   Result Value Ref Range    WBC 8 08 4 31 - 10 16 Thousand/uL    RBC 3 88 3 81 - 5 12 Million/uL    Hemoglobin 10 7 (L) 11 5 - 15 4 g/dL    Hematocrit 33 4 (L) 34 8 - 46 1 %    MCV 86 82 - 98 fL    MCH 27 6 26 8 - 34 3 pg    MCHC 32 0 31 4 - 37 4 g/dL    RDW 14 9 11 6 - 15 1 %    Platelets 475 229 - 129 Thousands/uL    MPV 10 7 8 9 - 12 7 fL   Comprehensive metabolic panel    Collection Time: 08/07/20 12:10 AM   Result Value Ref Range    Sodium 139 136 - 145 mmol/L    Potassium 3 5 3 5 - 5 3 mmol/L    Chloride 105 100 - 108 mmol/L    CO2 25 21 - 32 mmol/L    ANION GAP 9 4 - 13 mmol/L    BUN 13 5 - 25 mg/dL    Creatinine 0 69 0 60 - 1 30 mg/dL    Glucose 94 65 - 140 mg/dL    Calcium 9 3 8 3 - 10 1 mg/dL    AST 20 5 - 45 U/L    ALT 15 12 - 78 U/L    Alkaline Phosphatase 156 (H) 46 - 116 U/L    Total Protein 6 3 (L) 6 4 - 8 2 g/dL    Albumin 2 1 (L) 3 5 - 5 0 g/dL    Total Bilirubin 0 14 (L) 0 20 - 1 00 mg/dL    eGFR 116 ml/min/1 73sq m       MEDS:   Current Facility-Administered Medications   Medication Dose Route Frequency    acetaminophen (TYLENOL) tablet 975 mg  975 mg Oral Q6H PRN    calcium carbonate (TUMS) chewable tablet 1,000 mg  1,000 mg Oral Daily PRN    diphenhydrAMINE (BENADRYL) tablet 25 mg  25 mg Oral Q6H PRN    docusate sodium (COLACE) capsule 100 mg  100 mg Oral Daily PRN    enoxaparin (LOVENOX) subcutaneous injection 40 mg  40 mg Subcutaneous Q12H [facility]    fentaNYL (SUBLIMAZE) injection 25 mcg  25 mcg Intravenous Q5 Min PRN    HYDROmorphone (DILAUDID) injection 1 mg  1 mg Intravenous Q2H PRN    ibuprofen (MOTRIN) tablet 600 mg  600 mg Oral Q6H PRN    lactated ringers infusion  125 mL/hr Intravenous Continuous    lactated ringers infusion  125 mL/hr Intravenous Continuous    ondansetron (ZOFRAN) injection 4 mg  4 mg Intravenous Q8H PRN    oxyCODONE-acetaminophen (PERCOCET) 5-325 mg per tablet 1 tablet  1 tablet Oral Q4H PRN    oxyCODONE-acetaminophen (PERCOCET) 5-325 mg per tablet 2 tablet  2 tablet Oral Q4H PRN    simethicone (MYLICON) chewable tablet 80 mg  80 mg Oral 4x Daily PRN     Invasive Devices     Peripheral Intravenous Line            Peripheral IV 08/05/20 Right Wrist 1 day                Versa Mortimer, MD  PGY-1 OB/GYN   8/7/2020 6:51 AM

## 2020-08-07 NOTE — DISCHARGE INSTRUCTIONS
WHAT YOU NEED TO KNOW:   A , or  section, is abdominal surgery to deliver your baby  DISCHARGE INSTRUCTIONS:   Call 911 for any of the following:   · You feel lightheaded, short of breath, and have chest pain  · You cough up blood  Seek care immediately if:   · Blood soaks through your bandage  · Your stitches come apart  · Your arm or leg feels warm, tender, and painful  It may look swollen and red  Contact your OB if:   · You have heavy vaginal bleeding that fills 1 or more sanitary pads in 1 hour  · You have a fever  · Your incision is swollen, red, or draining pus  · You have questions or concerns about yourself or your baby  Medicines: You may  need any of the following:  · Prescription pain medicine  may be given  Ask how to take this medicine safely  · Acetaminophen  decreases pain and fever  It is available without a doctor's order  Ask how much to take and how often to take it  Follow directions  Acetaminophen can cause liver damage if not taken correctly  · NSAIDs , such as ibuprofen, help decrease swelling, pain, and fever  NSAIDs can cause stomach bleeding or kidney problems in certain people  If you take blood thinner medicine, always ask your healthcare provider if NSAIDs are safe for you  Always read the medicine label and follow directions  · Take your medicine as directed  Contact your healthcare provider if you think your medicine is not helping or if you have side effects  Tell him or her if you are allergic to any medicine  Keep a list of the medicines, vitamins, and herbs you take  Include the amounts, and when and why you take them  Bring the list or the pill bottles to follow-up visits  Carry your medicine list with you in case of an emergency  Wound care:  Carefully wash your wound with soap and water every day  Keep your wound clean and dry  Wear loose, comfortable clothes that do not rub against your wound   Ask your OB about bathing and showering  Limit activity as directed:   · Ask when it is safe for you to drive, walk up stairs, lift heavy objects, and have sex  · Ask when it is okay to exercise, and what types of exercise to do  Start slowly and do more as you get stronger  Drink liquids as directed:  Liquids help keep you hydrated after your procedure and decrease your risk for a blood clot  Ask how much liquid to drink each day and which liquids are best for you  Follow up with your OB as directed: You may need to return to have your stitches or staples removed  Write down your questions so you remember to ask them during your visits  © 2017 [address]  Information is for End User's use only and may not be sold, redistributed or otherwise used for commercial purposes  All illustrations and images included in CareNotes® are the copyrighted property of A D A M , Inc  or [employer]  The above information is an  only  It is not intended as medical advice for individual conditions or treatments  Talk to your doctor, nurse or pharmacist before following any medical regimen to see if it is safe and effective for you  Novel Coronavirus   WHAT YOU NEED TO KNOW:   The nCoV is a new strain (type) of coronavirus that can cause severe respiratory (lung) infection  DISCHARGE INSTRUCTIONS:   Call your local emergency number (911 in the [address] [place] [address],[address]) for any of the following:  Tell the  you may have nCoV  This will help anyone in the ambulance stay safe, and to call ahead to the hospital   · You have sudden shortness of breath  · You have a fast heartbeat or chest pain  Seek care immediately if:   · You feel so dizzy that you have trouble standing up  · Your lips and fingernails turn blue  Call your doctor if:   · You have a fever over 102ºF (39ºC)  · Your sore throat gets worse or you see white or yellow spots in your throat      · Your symptoms get worse after 3 to 5 days or your cold is not better in 14 days  · You have a rash anywhere on your skin  · You have large, tender lumps in your neck  · You have thick, green, or yellow drainage from your nose  · You cough up thick yellow, green, or bloody mucus  · You are vomiting for more than 24 hours and cannot keep fluids down  · You have a bad earache  · You have questions or concerns about your condition or care  Medicines: You may need any of the following:  · Decongestants  help reduce nasal congestion and help you breathe more easily  If you take decongestant pills, they may make you feel restless or cause problems with your sleep  Do not use decongestant sprays for more than a few days  · Cough suppressants  help reduce coughing  Ask your healthcare provider which type of cough medicine is best for you  · NSAIDs , such as ibuprofen, help decrease swelling, pain, and fever  NSAIDs can cause stomach bleeding or kidney problems in certain people  If you take blood thinner medicine, always ask your healthcare provider if NSAIDs are safe for you  Always read the medicine label and follow directions  · Acetaminophen  decreases pain and fever  It is available without a doctor's order  Ask how much to take and how often to take it  Follow directions  Read the labels of all other medicines you are using to see if they also contain acetaminophen, or ask your doctor or pharmacist  Acetaminophen can cause liver damage if not taken correctly  Do not use more than 4 grams (4,000 milligrams) total of acetaminophen in one day  · Take your medicine as directed  Contact your healthcare provider if you think your medicine is not helping or if you have side effects  Tell him or her if you are allergic to any medicine  Keep a list of the medicines, vitamins, and herbs you take  Include the amounts, and when and why you take them  Bring the list or the pill bottles to follow-up visits   Carry your medicine list with you in case of an emergency  Help relieve mild symptoms:   · Drink more liquids as directed  Liquids will help thin and loosen mucus so you can cough it up  Liquids will also help prevent dehydration  Liquids that help prevent dehydration include water, fruit juice, and broth  Do not drink liquids that contain caffeine  Caffeine can increase your risk for dehydration  Ask your healthcare provider how much liquid to drink each day  · Soothe a sore throat  Gargle with warm salt water  This helps your sore throat feel better  Make salt water by dissolving ¼ teaspoon salt in 1 cup warm water  You may also suck on hard candy or throat lozenges  You may use a sore throat spray  · Use a humidifier or vaporizer  Use a cool mist humidifier or a vaporizer to increase air moisture in your home  This may make it easier for you to breathe and help decrease your cough  · Use saline nasal drops as directed  These help relieve congestion  · Apply petroleum-based jelly around the outside of your nostrils  This can decrease irritation from blowing your nose  · Do not smoke  Nicotine and other chemicals in cigarettes and cigars can make your symptoms worse  They can also cause infections such as bronchitis or pneumonia  Ask your healthcare provider for information if you currently smoke and need help to quit  E-cigarettes or smokeless tobacco still contain nicotine  Talk to your healthcare provider before you use these products  Prevent the spread of nCoV:  If you are around anyone who has nCoV, the following can help you protect you from infection  Have the person follow the guidelines to prevent infecting others:  · Limit close contact with others  Anyone with nCoV should stay in a different room, or sleep in a separate bed  Others need to stay at least 6 feet (2 meters) away  The person should only go out of the house for medical appointments   He or she should always call the office of any healthcare provider  The provider will need to prepare to keep others safe  · Wear a medical mask when around others  You can wear a medical mask or have the person wear one  A medical mask will help prevent nCoV from spreading  · Cover your mouth and nose to sneeze or cough  Everyone should always cover a sneeze or cough  Use a tissue that covers the mouth and nose  Use the bend of our arm if a tissue is not available  Throw the tissue away in a trash can right away  Then wash your hands well with soap and water  Use hand  that contains alcohol if you cannot wash your hands  · Wash your hands often  You and everyone in your home must wash your hands throughout the day  Use soap and water  Use germ-killing gel if soap and water are not available  A person with nCoV should not touch his or her eyes or mouth without washing his or her hands first          · Do not share items  Do not share dishes, towels, or other items with anyone  Always wash items after a person who has nCoV uses them  · Clean surfaces often  Use household  or bleach diluted with water to clean counters, doorknobs, toilet seats, and other surfaces  · Do not handle live animals  The nCoV may be spread to animals, including pets  Until more is known, it is best for a person with nCoV not to touch, play with, or handle live animals  Follow up with your doctor as directed:  Write down your questions so you remember to ask them during your visits  © Copyright [address] Information is for End User's use only and may not be sold, redistributed or otherwise used for commercial purposes  All illustrations and images included in CareNotes® are the copyrighted property of A D A M , Inc  or [employer]  The above information is an  only  It is not intended as medical advice for individual conditions or treatments   Talk to your doctor, nurse or pharmacist before following any medical regimen to see if it is safe and effective for you

## 2020-08-07 NOTE — NURSING NOTE
Maternal and  discharge education completed with patient and FOB  Discharge packet given and explained  All questions answered at this time

## 2020-08-07 NOTE — QUICK NOTE
Patient noted to have elevated BP, >007 systolic  On review of prenatal record, had elevated BP at 28 weeks and on POD0, so would meet criteria for hypertensive disorder of pregnancy  Will send CBC and CMP at this time and continue to monitor      Discussed with Dr Tasha Garcia MD  Ob/Gyn R-2  08/06/20 11:59 PM

## 2020-08-07 NOTE — LACTATION NOTE
This note was copied from a baby's chart  Met with mother and father to go over discharge breastfeeding booklet including the feeding log  Emphasized 8 or more (12) feedings in a 24 hour period, what to expect for the number of diapers per day of life and the progression of properties of the  stooling pattern  Reviewed breastfeeding and your lifestyle, storage and preparation of breast milk, how to keep you breast pump clean, the employed breastfeeding mother and paced bottle feeding handouts  Booklet included Breastfeeding Resources for after discharge including access to the number for the [address]  Dad supportive at bedside  Encouraged parents to call for assistance, questions, and concerns about breastfeeding  Extension provided

## 2020-08-10 ENCOUNTER — TELEPHONE (OUTPATIENT)
Dept: OBGYN CLINIC | Facility: CLINIC | Age: 30
End: 2020-08-10

## 2020-08-10 NOTE — TELEPHONE ENCOUNTER
Patient called c/o post partum diarrhea  Had c/sec on 8/5/20  Patient did not answer return call  Left message advising BRAT diet, increase clear fluids, ask pediatrician if it is okay to use OTC Imodium while breastfeeding

## 2020-08-13 ENCOUNTER — TELEPHONE (OUTPATIENT)
Dept: OBGYN CLINIC | Facility: CLINIC | Age: 30
End: 2020-08-13

## 2020-08-13 LAB — PLACENTA IN STORAGE: NORMAL

## 2020-08-13 NOTE — TELEPHONE ENCOUNTER
What symptoms that she have? If not allergic, would treat with Keflex 500 mg 4 times daily for 7 days time  Please arrange for this    If not improved in a few days time, would recommend exam

## 2020-08-14 DIAGNOSIS — N61.0 MASTITIS, RIGHT, ACUTE: Primary | ICD-10-CM

## 2020-08-14 RX ORDER — CEPHALEXIN 500 MG/1
500 CAPSULE ORAL EVERY 6 HOURS SCHEDULED
Qty: 28 CAPSULE | Refills: 0 | Status: SHIPPED | OUTPATIENT
Start: 2020-08-14 | End: 2020-08-21

## 2020-08-14 NOTE — TELEPHONE ENCOUNTER
She has the breast pain and she had it with her other baby    She is allergic to Ceforoxime  Stomach upset  Flaco Echevarria

## 2020-08-14 NOTE — TELEPHONE ENCOUNTER
Called post partum patient to check per Dr Lachelle Olvera  Patient states she thinks she has mastitis of the right breast due to hardness of the breast, tender to touch, low grade fever  Patient had mastitis with previous baby  States took Keflex before with no problems  Also advised warm compress, Tylenol, increase fluids, continue breastfeeding  Escript sent

## 2020-08-14 NOTE — TELEPHONE ENCOUNTER
Agree, patient without reaction to Keflex upon using in the past for previous mastitis  We will treat with Keflex as discussed    Thanks

## 2020-08-17 ENCOUNTER — OFFICE VISIT (OUTPATIENT)
Dept: OBGYN CLINIC | Facility: CLINIC | Age: 30
End: 2020-08-17
Payer: COMMERCIAL

## 2020-08-17 VITALS
SYSTOLIC BLOOD PRESSURE: 158 MMHG | HEIGHT: 65 IN | BODY MASS INDEX: 39.82 KG/M2 | WEIGHT: 239 LBS | TEMPERATURE: 97.8 F | DIASTOLIC BLOOD PRESSURE: 94 MMHG

## 2020-08-17 DIAGNOSIS — Z30.09 BIRTH CONTROL COUNSELING: ICD-10-CM

## 2020-08-17 DIAGNOSIS — Z98.891 STATUS POST REPEAT LOW TRANSVERSE CESAREAN SECTION: Primary | ICD-10-CM

## 2020-08-17 DIAGNOSIS — R03.0 ELEVATED BLOOD PRESSURE READING: ICD-10-CM

## 2020-08-17 DIAGNOSIS — N61.0 MASTITIS: ICD-10-CM

## 2020-08-17 PROCEDURE — 1036F TOBACCO NON-USER: CPT | Performed by: OBSTETRICS & GYNECOLOGY

## 2020-08-17 PROCEDURE — 99214 OFFICE O/P EST MOD 30 MIN: CPT | Performed by: OBSTETRICS & GYNECOLOGY

## 2020-08-17 PROCEDURE — 1111F DSCHRG MED/CURRENT MED MERGE: CPT | Performed by: OBSTETRICS & GYNECOLOGY

## 2020-08-17 NOTE — PROGRESS NOTES
Assessment/Plan   Diagnoses and all orders for this visit:    Status post repeat low transverse  section    Mastitis    Elevated blood pressure reading    1  Incision check-incision is clean, dry, and intact  Steri-Strips are still present, recommend removed when they become not adherent to the skin  Activity instructions were reviewed, okay to drive and increased some activity  Recommend avoid any heavy lifting, abdominal exercises, or any pelvic activity  To follow-up in 4 weeks time for 6 week postpartum check  2  Right breast mastitis-patient called on 20 with mastitis in the inner portion of the right breast   She was started on Keflex on 20  Her temperature was up to 102, now resolved  Exam is without any warmth or erythema or tenderness in the breast   She will finish the Keflex  Practical recommendations including continued breast drainage with breast-feeding or pumping, adequate fluid hydration, ibuprofen or Tylenol as needed were reviewed  3  Elevated blood pressure-noted to be in the 150s over 90 range today at maximum  Patient denies any headaches, visual changes, epigastric pain, or swelling  Exam is notable for nontender abdomen, reflexes 2+ without clonus, no edema  Patient did not have blood pressure issues during her 1st pregnancy  Reviewing her hospitalization, some blood pressures up to 140s over 80s to 90s were noted prior to discharge  She will call with any preeclamptic symptoms as described above  4  Contraception-patient interested in Mirena IUD, we will insert after 6 weeks  To follow-up in 1-2 weeks time for blood pressure check  Subjective   Patient ID: Elie Siemens is a 31 y o  female  Vitals:    20 1149   BP: 158/94   Temp: 97 8 °F (36 6 °C)     Patient was seen today for follow-up visit        The following portions of the patient's history were reviewed and updated as appropriate: allergies, current medications, past family history, past medical history, past social history, past surgical history and problem list   Past Medical History:   Diagnosis Date    Acute sinusitis     24 may 2017 resolved    Breastfeeding (infant)     Dysfunction of eustachian tube     24 may 2017    unspecified laterality    Elevated blood pressure reading     2016  resolved    Gallbladder pain     "sporadic"    Gave birth to child recently     2016    IUD (intrauterine device) in place     2016  resolved    Migraine     Varicella      Past Surgical History:   Procedure Laterality Date     SECTION N/A 2016    Procedure:  SECTION ();   Surgeon: Rakel Morris MD;  Location: [facility];  Service:     CHOLECYSTECTOMY      laparoscopic    COLONOSCOPY      EAR SURGERY Bilateral     ear pressure equalization tube bilateral    GALLBLADDER SURGERY      INTRAUTERINE DEVICE INSERTION      MYRINGOTOMY W/ TUBES Left     MD  DELIVERY ONLY N/A 2020    Procedure:  SECTION () REPEAT;  Surgeon: Rakel Morris MD;  Location: [facility];  Service: Obstetrics    MD LAP,CHOLECYSTECTOMY/GRAPH N/A 11/3/2016    Procedure: LAPAROSCOPIC CHOLECYSTECTOMY WITH INTROPERATIVE CHOLANGIOGRAM; LYSIS OF ADHESIONS;  Surgeon: Marquis Ngo MD;  Location: [facility];  Service: General    WISDOM TOOTH EXTRACTION       OB History    Para Term  AB Living   2 2 2 0 0 2   SAB TAB Ectopic Multiple Live Births   0 0 0 0 2      # Outcome Date GA Lbr Enio/2nd Weight Sex Delivery Anes PTL Lv   2 Term 20 39w4d  3230 g (7 lb 1 9 oz) F CS-LTranv Spinal N LORENZO   1 Term 16 39w0d  2994 g (6 lb 9 6 oz) F CS-LTranv Spinal N LORENZO       Current Outpatient Medications:     budesonide (RINOCORT AQUA) 32 MCG/ACT nasal spray, 1 spray into each nostril daily, Disp: 1 Bottle, Rfl: 3    cephalexin (KEFLEX) 500 mg capsule, Take 1 capsule (500 mg total) by mouth every 6 (six) hours for 7 days, Disp: 28 capsule, Rfl: 0    ibuprofen (MOTRIN) 600 mg tablet, Take 1 tablet (600 mg total) by mouth every 6 (six) hours as needed for mild pain or moderate pain, Disp: 50 tablet, Rfl: 0    Prenatal Vit-DSS-Fe Cbn-FA (PRENATAL AD PO), Take by mouth, Disp: , Rfl:   Allergies   Allergen Reactions    Cefuroxime GI Intolerance    Rizatriptan Nausea Only     Social History     Socioeconomic History    Marital status: /Civil Union     Spouse name: None    Number of children: 1    Years of education: None    Highest education level: None   Occupational History    None   Social Needs    Financial resource strain: None    Food insecurity     Worry: None     Inability: None    Transportation needs     Medical: None     Non-medical: None   Tobacco Use    Smoking status: Never Smoker    Smokeless tobacco: Never Used    Tobacco comment: no nd hand smoke exposure   Substance and Sexual Activity    Alcohol use: Not Currently     Comment: socially    Drug use: No    Sexual activity: Yes     Partners: Male   Lifestyle    Physical activity     Days per week: None     Minutes per session: None    Stress: None   Relationships    Social connections     Talks on phone: None     Gets together: None     Attends [employer] service: None     Active member of club or organization: None     Attends meetings of clubs or organizations: None     Relationship status: None    Intimate partner violence     Fear of current or ex partner: None     Emotionally abused: None     Physically abused: None     Forced sexual activity: None   Other Topics Concern    None   Social History Narrative    Caffeine use    No [employer] affiliation    Uses safety equipment seat belts     Family History   Problem Relation Age of Onset    Breast cancer Mother     Heart attack Father     Heart disease Father     Heart attack Paternal Grandfather     Heart disease Paternal Grandfather     Breast cancer Maternal Aunt        Review of Systems Constitutional: Negative for chills, diaphoresis, fatigue and fever  Respiratory: Negative for apnea, cough, chest tightness, shortness of breath and wheezing  Cardiovascular: Negative for chest pain, palpitations and leg swelling  Gastrointestinal: Negative for abdominal distention, abdominal pain, anal bleeding, constipation, diarrhea, nausea, rectal pain and vomiting  Genitourinary: Negative for difficulty urinating, dyspareunia, dysuria, frequency, hematuria, menstrual problem, pelvic pain, urgency, vaginal bleeding, vaginal discharge and vaginal pain  Musculoskeletal: Negative for arthralgias, back pain and myalgias  Skin: Negative for color change and rash  Neurological: Negative for dizziness, syncope, light-headedness, numbness and headaches  Hematological: Negative for adenopathy  Does not bruise/bleed easily  Psychiatric/Behavioral: Negative for dysphoric mood and sleep disturbance  The patient is not nervous/anxious  Objective   Physical Exam    Objective      /94 (BP Location: Left arm, Patient Position: Sitting, Cuff Size: Large)   Temp 97 8 °F (36 6 °C)   Ht 5' 5" (1 651 m)   Wt 108 kg (239 lb)   LMP 11/02/2019 (Exact Date)   BMI 39 77 kg/m²     General:   alert and oriented, in no acute distress   Neck: normal to inspection and palpation   Breast: normal appearance, no masses or tenderness   Heart:    Lungs:    Abdomen: soft, non-tender, without masses or organomegaly    Incision is clean, dry, intact   Vulva: normal   Vagina:    Cervix:    Uterus:    Adnexa:    Rectum:     Psych:  Normal mood and affect   Skin:  Without obvious lesions   Eyes: symmetric, with normal movements and reactivity   Musculoskeletal:  Normal muscle tone and movements appreciated

## 2020-08-17 NOTE — PATIENT INSTRUCTIONS
Mastitis   WHAT YOU NEED TO KNOW:   What is mastitis? Mastitis is an infection of breast tissue that most often occurs in women who breastfeed  It can happen any time during breastfeeding, but usually occurs within the first 3 months after giving birth  Usually only one breast is affected  What causes mastitis? Mastitis is commonly caused by bacteria that enter through a break or crack in the skin on the nipple  It can also happen if a milk duct becomes plugged because the breast has not been completely emptied  If you have had mastitis before, you are more likely to have it again  What are the signs and symptoms of mastitis? · Chills and fever    · Breast swelling, redness, warmth, and tenderness     · Tenderness under your arm    · Fatigue and body aches  How is mastitis diagnosed and treated? Your healthcare provider will examine you  He will also ask about your symptoms, and if you have had mastitis before  You can continue to breastfeed your baby while you are being treated for mastitis  Breastfeeding when you have mastitis may help speed your recovery  You may need any of the following:  · Antibiotics  help treat or prevent a bacterial infection  · Acetaminophen  decreases pain and fever  It is available without a doctor's order  Ask how much to take and how often to take it  Follow directions  Acetaminophen can cause liver damage if not taken correctly  · NSAIDs , such as ibuprofen, help decrease swelling, pain, and fever  This medicine is available with or without a doctor's order  NSAIDs can cause stomach bleeding or kidney problems in certain people  If you take blood thinner medicine, always ask your healthcare provider if NSAIDs are safe for you  Always read the medicine label and follow directions  · Incision and drainage  may be needed if the swelling does not go away and an abscess forms  Your healthcare provider will make a small incision in your breast to drain the pus    How can I Ortho manage my symptoms? · Continue to breastfeed from the affected breast   This will help to prevent an abscess from forming  Breastfeed your baby on the affected side first  Apply a warm, wet cloth on your breast or take a warm shower before you feed your baby  This can help increase your milk flow  If it is painful when you breastfeed from the affected breast, feed your baby from the other breast first  Pump the affected side to completely drain your breast after breastfeeding, if needed  You may save the pumped milk to feed your baby  · Use different positions to breastfeed  Change the position of your baby during feedings  This may help to relieve your discomfort  · Apply heat on your breast for 20 to 30 minutes every 2 hours for as many days as directed  Heat helps decrease pain  · Apply ice after feedings  Apply ice on your breast for 15 to 20 minutes every hour or as directed  Use an ice pack, or put crushed ice in a plastic bag  Cover it with a towel  Ice helps prevent tissue damage and decreases swelling and pain  · Massage your breast   Gently massage your breast before and during breastfeeding to help drain your milk  · Drink liquids as directed  Ask how much liquid to drink each day and which liquids are best for you  · Rest as needed  Do not sleep on your stomach until your infection is gone  How can I prevent mastitis? · Breastfeed every 2 or 3 hours to prevent engorgement  Breast engorgement develops when too much milk builds up in your breast  Take your time when you breastfeed to allow your baby to empty your breast  Try not to switch breasts too early  Express or pump after you breastfeed if your baby is not emptying your breasts when he feeds  · Prevent sore and cracked nipples  A good latch prevents sore and cracked nipples  If you have sore nipples after breastfeeding, your baby may not be latched on properly   Gently break suction and reposition if your baby is only sucking on the nipple  Talk to a lactation consultant if you need help with your baby's latch  · Care for your breasts  Keep your nipples clean and dry between feedings  Check them for cracks, blisters, or other irritated areas  Ask a lactation specialist or your healthcare provider how to treat sore and cracked nipples  Wash your hands before and after you breastfeed your baby or pump your breasts  Wear a comfortable nursing bra that supports your breasts but is not too tight  When should I contact my healthcare provider? · Your symptoms do not get better within 2 days  · You have a painful lump in your breast      · You have swollen and tender lymph nodes in your armpit on the same side as the affected breast     · You have questions or concerns about your condition or care  CARE AGREEMENT:   You have the right to help plan your care  Learn about your health condition and how it may be treated  Discuss treatment options with your caregivers to decide what care you want to receive  You always have the right to refuse treatment  The above information is an  only  It is not intended as medical advice for individual conditions or treatments  Talk to your doctor, nurse or pharmacist before following any medical regimen to see if it is safe and effective for you  © 2017 [facility] Information is for End User's use only and may not be sold, redistributed or otherwise used for commercial purposes  All illustrations and images included in CareNotes® are the copyrighted property of A D A M , Inc  or [employer]  Hypertension   WHAT YOU NEED TO KNOW:   What is hypertension? Hypertension is high blood pressure (BP)  Your BP is the force of your blood moving against the walls of your arteries  Normal BP is less than 120/80  Prehypertension is between 120/80 and 139/89  Hypertension is 140/90 or higher   Hypertension causes your BP to get so high that your heart has to work much harder than normal  This can damage your heart  You can control hypertension with a healthy lifestyle or medicines  A controlled blood pressure helps protect your organs, such as your heart, lungs, brain, and kidneys  What causes hypertension? The cause of hypertension may not be known  This type of hypertension is called essential or primary hypertension  Hypertension can sometimes be caused by other medical conditions, such as kidney disease  This type of hypertension is called secondary hypertension  What increases my risk for hypertension? · Age older than 54 years (men)    · Age older than 72 years (women)    · A family history of hypertension or heart disease     · Obesity or lack of exercise     · Too many high-sodium foods    · Stress     · Use of tobacco, alcohol, or illegal drugs     · A medical condition, such as diabetes, kidney disease, thyroid disease, or adrenal gland disorder     · Certain medicines, such as steroids or birth control pills  What are the signs and symptoms of hypertension? You may have no signs or symptoms, or you may have any of the following:  · Headache     · Blurred vision     · Chest pain     · Dizziness or weakness     · Trouble breathing    · Nosebleeds  How is hypertension diagnosed? Your healthcare provider will ask about your symptoms and the medicines you take  He or she will also ask if you have a family history of high blood pressure and about any health conditions you have  He or she will also check your blood pressure and weight and examine your heart, lungs, and eyes  You may need any of the following tests:  · Blood tests  may help healthcare providers find the cause of your hypertension  Blood tests can also help find other health problems caused by hypertension  · Urine tests  will be done to check your kidney function  Kidney problems can increase your risk for hypertension  How is hypertension treated?   Your healthcare provider will recommend lifestyle changes to lower your BP  You may also need the following medicines:  · Medicine  may be used to help lower your BP  You may need more than one type of medicine  Take the medicine exactly as directed  · Diuretics  help decrease extra fluid that collects in your body  This will help lower your BP  You may urinate more often while you take this medicine  · Cholesterol medicine  helps lower your cholesterol level  A low cholesterol level helps prevent heart disease and makes it easier to control your blood pressure  What can I do to manage hypertension? Talk with your healthcare provider about these and other ways to manage hypertension:  · Check your BP at home  Sit and rest for 5 minutes before you take your BP  Extend your arm and support it on a flat surface  Your arm should be at the same level as your heart  Follow the directions that came with your BP monitor  If possible, take at least 2 BP readings each time  Take your BP at least twice a day at the same times each day, such as morning and evening  Keep a record of your BP readings and bring it to your follow-up visits  Ask your healthcare provider what your BP should be  · Limit sodium (salt) as directed  Too much sodium can affect your fluid balance  Check labels to find low-sodium or no-salt-added foods  Some low-sodium foods use potassium salts for flavor  Too much potassium can also cause health problems  Your healthcare provider will tell you how much sodium and potassium are safe for you to have in a day  He or she may recommend that you limit sodium to 2,300 mg a day  · Follow the meal plan recommended by your healthcare provider  A dietitian or your provider can give you more information on low-sodium plans or the DASH (Dietary Approaches to Stop Hypertension) eating plan  The DASH plan is low in sodium, unhealthy fats, and total fat  It is high in potassium, calcium, and fiber             · Exercise to maintain a healthy weight  Exercise at least 30 minutes per day, on most days of the week  This will help decrease your blood pressure  Ask your healthcare provider about the best exercise plan for you  · Decrease stress  This may help lower your BP  Learn ways to relax, such as deep breathing or listening to music  · Limit alcohol  Women should limit alcohol to 1 drink a day  Men should limit alcohol to 2 drinks a day  A drink of alcohol is 12 ounces of beer, 5 ounces of wine, or 1½ ounces of liquor  · Do not smoke  Nicotine and other chemicals in cigarettes and cigars can increase your BP and also cause lung damage  Ask your healthcare provider for information if you currently smoke and need help to quit  E-cigarettes or smokeless tobacco still contain nicotine  Talk to your healthcare provider before you use these products  · Manage any other health conditions you have  Health conditions such as diabetes can increase your risk for hypertension  Follow your healthcare provider's instructions and take all your medicines as directed  Call 911 for any of the following:   · You have discomfort in your chest that feels like squeezing, pressure, fullness, or pain  · You become confused or have difficulty speaking  · You suddenly feel lightheaded or have trouble breathing  · You have pain or discomfort in your back, neck, jaw, stomach, or arm  When should I seek immediate care? · You have a severe headache or vision loss  · You have weakness in an arm or leg  When should I contact my healthcare provider? · You feel faint, dizzy, confused, or drowsy  · You have been taking your BP medicine and your BP is still higher than your healthcare provider says it should be  · You have questions or concerns about your condition or care  CARE AGREEMENT:   You have the right to help plan your care  Learn about your health condition and how it may be treated   Discuss treatment options with your caregivers to decide what care you want to receive  You always have the right to refuse treatment  The above information is an  only  It is not intended as medical advice for individual conditions or treatments  Talk to your doctor, nurse or pharmacist before following any medical regimen to see if it is safe and effective for you  © 2017 [address] [employer] Information is for End User's use only and may not be sold, redistributed or otherwise used for commercial purposes  All illustrations and images included in CareNotes® are the copyrighted property of A D A M , Inc  or Chapin Vergara

## 2020-08-24 ENCOUNTER — OFFICE VISIT (OUTPATIENT)
Dept: OBGYN CLINIC | Facility: CLINIC | Age: 30
End: 2020-08-24

## 2020-08-24 VITALS
WEIGHT: 234.6 LBS | HEIGHT: 65 IN | TEMPERATURE: 97.1 F | DIASTOLIC BLOOD PRESSURE: 100 MMHG | BODY MASS INDEX: 39.09 KG/M2 | SYSTOLIC BLOOD PRESSURE: 138 MMHG

## 2020-08-24 VITALS
TEMPERATURE: 97.1 F | BODY MASS INDEX: 39.09 KG/M2 | SYSTOLIC BLOOD PRESSURE: 132 MMHG | HEIGHT: 65 IN | DIASTOLIC BLOOD PRESSURE: 86 MMHG | WEIGHT: 234.6 LBS

## 2020-08-24 DIAGNOSIS — Z98.891 HISTORY OF 2 CESAREAN SECTIONS: Primary | ICD-10-CM

## 2020-08-24 PROCEDURE — 99024 POSTOP FOLLOW-UP VISIT: CPT | Performed by: OBSTETRICS & GYNECOLOGY

## 2020-08-24 PROCEDURE — 3008F BODY MASS INDEX DOCD: CPT | Performed by: OBSTETRICS & GYNECOLOGY

## 2020-08-24 PROCEDURE — 1111F DSCHRG MED/CURRENT MED MERGE: CPT | Performed by: OBSTETRICS & GYNECOLOGY

## 2020-08-25 NOTE — PROGRESS NOTES
Assessment/Plan:    Diagnoses and all orders for this visit:    History of 2  sections    Postpartum hypertension        Subjective:  Here for follow-up care and blood pressure check     Patient ID: Mayra Carrasco is a 31 y o  female  HPI   26-year-old female status post elective repeat  section on 2020  She is doing well, she is breastfeeding she is tolerating regular diet  She is here today for blood pressure check she did have some elevated pressures during the postpartum period, not severe  Preeclamptic labs were all within normal limits  Her blood pressure today was 132/86  No complaints of headache or vision or double vision  She does not have a blood pressure cuff  Review of Systems   Respiratory: Negative  Cardiovascular: Negative  Gastrointestinal: Negative  Genitourinary: Negative  Neurological: Negative  Psychiatric/Behavioral: Negative  All other systems reviewed and are negative  Objective:  No acute distress  /86 (BP Location: Left arm, Patient Position: Sitting, Cuff Size: Standard)   Temp (!) 97 1 °F (36 2 °C)   Ht 5' 5" (1 651 m)   Wt 106 kg (234 lb 9 6 oz)   LMP 2019 (Exact Date)   BMI 39 04 kg/m²      Physical Exam  Vitals signs reviewed  Constitutional:       Appearance: Normal appearance  HENT:      Head: Normocephalic  Eyes:      Pupils: Pupils are equal, round, and reactive to light  Cardiovascular:      Rate and Rhythm: Normal rate  Pulmonary:      Effort: Pulmonary effort is normal    Abdominal:      General: Abdomen is flat  Palpations: Abdomen is soft  Genitourinary:     Comments: Pelvic exam deferred  Musculoskeletal: Normal range of motion  Skin:     General: Skin is warm  Neurological:      General: No focal deficit present  Mental Status: She is alert and oriented to person, place, and time     Psychiatric:         Mood and Affect: Mood normal          Behavior: Behavior normal

## 2020-08-28 ENCOUNTER — TELEPHONE (OUTPATIENT)
Dept: OBGYN CLINIC | Facility: CLINIC | Age: 30
End: 2020-08-28

## 2020-08-28 NOTE — TELEPHONE ENCOUNTER
[facility] paperwork has been completed, faxed to employer, scanned into chart, and originals mailed via [address] Mail to patient

## 2020-09-21 ENCOUNTER — POSTPARTUM VISIT (OUTPATIENT)
Dept: OBGYN CLINIC | Facility: CLINIC | Age: 30
End: 2020-09-21

## 2020-09-21 VITALS
WEIGHT: 236 LBS | HEIGHT: 65 IN | BODY MASS INDEX: 39.32 KG/M2 | SYSTOLIC BLOOD PRESSURE: 124 MMHG | TEMPERATURE: 97.3 F | DIASTOLIC BLOOD PRESSURE: 96 MMHG

## 2020-09-21 DIAGNOSIS — Z30.014 ENCOUNTER FOR INITIAL PRESCRIPTION OF INTRAUTERINE CONTRACEPTIVE DEVICE (IUD): Primary | ICD-10-CM

## 2020-09-21 DIAGNOSIS — E66.9 OBESITY (BMI 35.0-39.9 WITHOUT COMORBIDITY): ICD-10-CM

## 2020-09-21 PROCEDURE — 99024 POSTOP FOLLOW-UP VISIT: CPT | Performed by: OBSTETRICS & GYNECOLOGY

## 2020-09-21 PROCEDURE — 87591 N.GONORRHOEAE DNA AMP PROB: CPT | Performed by: OBSTETRICS & GYNECOLOGY

## 2020-09-21 PROCEDURE — 87491 CHLMYD TRACH DNA AMP PROBE: CPT | Performed by: OBSTETRICS & GYNECOLOGY

## 2020-09-21 NOTE — PROGRESS NOTES
OB POSTPARTUM VISIT PROGRESS NOTE  Date of Encounter:  2020      Yissel Blackwell is in for her postpartum visit  She is now 6 weeks postpartum  She delivered by repeat  section  She's generally doing well, denies current pain or bleeding issues, and has no significant depression issues  She is bottle feeding  We discussed all appropriate contraceptive options and she chooses IUD  Objective  No Acute Distress  EXAM:  GENERAL: alert, well appearing, and in no distress  VITALS: Documented in the Nurses notes  BREASTS: Deferred    PELVIC:   VULVA: Nml EGBUS  VAGINA: Normal, no discharge  Introitus well healed, slightly tender  CERVIX: Normal, no discharge  UTERUS: Anteverted, NSSC, Mobile, NT    RIGHT ADNEXUM: Nontender, no mass  LEFT ADNEXUM: Nontender, no mass  RECTAL: Deferred      Assessment/Plan   Normal postpartum visit and exam  Reviewed contraceptive planning  Return for IUD insertion within the next 2 weeks  Follow-up for annual exam and p charmaine Mccarty, DO

## 2020-09-30 ENCOUNTER — TELEPHONE (OUTPATIENT)
Dept: OBGYN CLINIC | Facility: CLINIC | Age: 30
End: 2020-09-30

## 2020-10-01 ENCOUNTER — TELEPHONE (OUTPATIENT)
Dept: OBGYN CLINIC | Facility: CLINIC | Age: 30
End: 2020-10-01

## 2020-10-02 LAB
C TRACH DNA SPEC QL NAA+PROBE: NEGATIVE
N GONORRHOEA DNA SPEC QL NAA+PROBE: NEGATIVE

## 2020-10-05 ENCOUNTER — OFFICE VISIT (OUTPATIENT)
Dept: FAMILY MEDICINE CLINIC | Facility: CLINIC | Age: 30
End: 2020-10-05
Payer: COMMERCIAL

## 2020-10-05 VITALS
DIASTOLIC BLOOD PRESSURE: 82 MMHG | WEIGHT: 241.13 LBS | HEIGHT: 65 IN | TEMPERATURE: 97.5 F | SYSTOLIC BLOOD PRESSURE: 122 MMHG | HEART RATE: 80 BPM | BODY MASS INDEX: 40.17 KG/M2

## 2020-10-05 DIAGNOSIS — Z23 ENCOUNTER FOR IMMUNIZATION: ICD-10-CM

## 2020-10-05 DIAGNOSIS — L73.2 HIDRADENITIS SUPPURATIVA OF LEFT AXILLA: Primary | ICD-10-CM

## 2020-10-05 DIAGNOSIS — O13.9 GESTATIONAL HYPERTENSION, ANTEPARTUM: ICD-10-CM

## 2020-10-05 DIAGNOSIS — Z98.891 STATUS POST REPEAT LOW TRANSVERSE CESAREAN SECTION: ICD-10-CM

## 2020-10-05 DIAGNOSIS — E66.01 MORBID OBESITY WITH BMI OF 40.0-44.9, ADULT (HCC): ICD-10-CM

## 2020-10-05 DIAGNOSIS — Z00.00 HEALTH CARE MAINTENANCE: ICD-10-CM

## 2020-10-05 PROBLEM — Z34.90 CURRENTLY PREGNANT: Status: RESOLVED | Noted: 2020-08-05 | Resolved: 2020-10-05

## 2020-10-05 PROBLEM — Z3A.39 39 WEEKS GESTATION OF PREGNANCY: Status: RESOLVED | Noted: 2020-01-02 | Resolved: 2020-10-05

## 2020-10-05 PROCEDURE — 90686 IIV4 VACC NO PRSV 0.5 ML IM: CPT | Performed by: FAMILY MEDICINE

## 2020-10-05 PROCEDURE — 99214 OFFICE O/P EST MOD 30 MIN: CPT | Performed by: FAMILY MEDICINE

## 2020-10-05 PROCEDURE — 90471 IMMUNIZATION ADMIN: CPT | Performed by: FAMILY MEDICINE

## 2020-10-05 RX ORDER — BENZOYL PEROXIDE 50 MG/ML
LIQUID TOPICAL
Qty: 1 BOTTLE | Refills: 3 | Status: SHIPPED | OUTPATIENT
Start: 2020-10-05 | End: 2020-11-10 | Stop reason: ALTCHOICE

## 2020-10-05 RX ORDER — CLINDAMYCIN PHOSPHATE 11.9 MG/ML
SOLUTION TOPICAL
Qty: 60 ML | Refills: 3 | Status: SHIPPED | OUTPATIENT
Start: 2020-10-05 | End: 2020-11-10 | Stop reason: ALTCHOICE

## 2020-10-05 RX ORDER — DOXYCYCLINE HYCLATE 100 MG/1
CAPSULE ORAL
Qty: 30 CAPSULE | Refills: 0 | Status: SHIPPED | OUTPATIENT
Start: 2020-10-05 | End: 2020-11-04

## 2020-10-09 ENCOUNTER — OFFICE VISIT (OUTPATIENT)
Dept: OBGYN CLINIC | Facility: CLINIC | Age: 30
End: 2020-10-09
Payer: COMMERCIAL

## 2020-10-09 VITALS
WEIGHT: 242 LBS | BODY MASS INDEX: 40.32 KG/M2 | DIASTOLIC BLOOD PRESSURE: 80 MMHG | TEMPERATURE: 98.1 F | HEIGHT: 65 IN | SYSTOLIC BLOOD PRESSURE: 116 MMHG

## 2020-10-09 DIAGNOSIS — Z30.430 ENCOUNTER FOR INSERTION OF INTRAUTERINE CONTRACEPTIVE DEVICE (IUD): ICD-10-CM

## 2020-10-09 DIAGNOSIS — Z30.430 ENCOUNTER FOR INSERTION OF MIRENA IUD: Primary | ICD-10-CM

## 2020-10-09 PROCEDURE — 58300 INSERT INTRAUTERINE DEVICE: CPT | Performed by: OBSTETRICS & GYNECOLOGY

## 2020-11-04 ENCOUNTER — TELEPHONE (OUTPATIENT)
Dept: OBGYN CLINIC | Facility: CLINIC | Age: 30
End: 2020-11-04

## 2020-11-10 ENCOUNTER — OFFICE VISIT (OUTPATIENT)
Dept: FAMILY MEDICINE CLINIC | Facility: CLINIC | Age: 30
End: 2020-11-10
Payer: COMMERCIAL

## 2020-11-10 VITALS
SYSTOLIC BLOOD PRESSURE: 110 MMHG | DIASTOLIC BLOOD PRESSURE: 76 MMHG | HEART RATE: 76 BPM | WEIGHT: 242 LBS | HEIGHT: 65 IN | BODY MASS INDEX: 40.32 KG/M2 | TEMPERATURE: 97.5 F

## 2020-11-10 DIAGNOSIS — H69.80 DYSFUNCTION OF EUSTACHIAN TUBE, UNSPECIFIED LATERALITY: ICD-10-CM

## 2020-11-10 DIAGNOSIS — G43.C0 PERIODIC HEADACHE SYNDROME, NOT INTRACTABLE: ICD-10-CM

## 2020-11-10 DIAGNOSIS — O13.9 GESTATIONAL HYPERTENSION, ANTEPARTUM: ICD-10-CM

## 2020-11-10 DIAGNOSIS — Z00.00 HEALTH CARE MAINTENANCE: ICD-10-CM

## 2020-11-10 DIAGNOSIS — R63.5 WEIGHT GAIN: ICD-10-CM

## 2020-11-10 DIAGNOSIS — J30.9 ALLERGIC RHINITIS, UNSPECIFIED SEASONALITY, UNSPECIFIED TRIGGER: ICD-10-CM

## 2020-11-10 DIAGNOSIS — Z13.220 ENCOUNTER FOR SCREENING FOR LIPID DISORDER: ICD-10-CM

## 2020-11-10 DIAGNOSIS — E66.01 MORBID OBESITY WITH BMI OF 40.0-44.9, ADULT (HCC): ICD-10-CM

## 2020-11-10 DIAGNOSIS — L73.2 HIDRADENITIS SUPPURATIVA OF LEFT AXILLA: Primary | ICD-10-CM

## 2020-11-10 PROBLEM — R03.0 ELEVATED BLOOD PRESSURE READING: Status: RESOLVED | Noted: 2020-08-17 | Resolved: 2020-11-10

## 2020-11-10 PROBLEM — R51.9 HEADACHE: Status: RESOLVED | Noted: 2020-08-05 | Resolved: 2020-11-10

## 2020-11-10 PROBLEM — O99.210 OBESITY AFFECTING PREGNANCY, ANTEPARTUM: Status: RESOLVED | Noted: 2020-03-23 | Resolved: 2020-11-10

## 2020-11-10 PROCEDURE — 99214 OFFICE O/P EST MOD 30 MIN: CPT | Performed by: FAMILY MEDICINE

## 2020-11-13 ENCOUNTER — OFFICE VISIT (OUTPATIENT)
Dept: OBGYN CLINIC | Facility: CLINIC | Age: 30
End: 2020-11-13
Payer: COMMERCIAL

## 2020-11-13 VITALS
HEIGHT: 65 IN | DIASTOLIC BLOOD PRESSURE: 78 MMHG | WEIGHT: 242 LBS | BODY MASS INDEX: 40.32 KG/M2 | SYSTOLIC BLOOD PRESSURE: 128 MMHG | TEMPERATURE: 97 F

## 2020-11-13 DIAGNOSIS — N92.6 IRREGULAR MENSES: Primary | ICD-10-CM

## 2020-11-13 DIAGNOSIS — Z97.5 IUD CONTRACEPTION: ICD-10-CM

## 2020-11-13 PROBLEM — O44.42 LOW LYING PLACENTA NOS OR WITHOUT HEMORRHAGE, SECOND TRIMESTER: Status: RESOLVED | Noted: 2020-03-23 | Resolved: 2020-11-13

## 2020-11-13 PROCEDURE — 99213 OFFICE O/P EST LOW 20 MIN: CPT | Performed by: OBSTETRICS & GYNECOLOGY

## 2020-11-13 PROCEDURE — 1036F TOBACCO NON-USER: CPT | Performed by: OBSTETRICS & GYNECOLOGY

## 2020-11-13 PROCEDURE — 3008F BODY MASS INDEX DOCD: CPT | Performed by: OBSTETRICS & GYNECOLOGY

## 2020-12-22 ENCOUNTER — TELEPHONE (OUTPATIENT)
Dept: OBGYN CLINIC | Facility: CLINIC | Age: 30
End: 2020-12-22

## 2020-12-22 ENCOUNTER — TELEPHONE (OUTPATIENT)
Dept: FAMILY MEDICINE CLINIC | Facility: CLINIC | Age: 30
End: 2020-12-22

## 2020-12-22 DIAGNOSIS — L73.2 HIDRADENITIS SUPPURATIVA: Primary | ICD-10-CM

## 2020-12-22 RX ORDER — DOXYCYCLINE HYCLATE 100 MG/1
CAPSULE ORAL
Qty: 30 CAPSULE | Refills: 0 | Status: SHIPPED | OUTPATIENT
Start: 2020-12-22 | End: 2021-12-22

## 2021-02-02 ENCOUNTER — TELEMEDICINE (OUTPATIENT)
Dept: FAMILY MEDICINE CLINIC | Facility: CLINIC | Age: 31
End: 2021-02-02
Payer: COMMERCIAL

## 2021-02-02 ENCOUNTER — TELEPHONE (OUTPATIENT)
Dept: OTHER | Facility: OTHER | Age: 31
End: 2021-02-02

## 2021-02-02 DIAGNOSIS — Z20.822 EXPOSURE TO COVID-19 VIRUS: Primary | ICD-10-CM

## 2021-02-02 PROCEDURE — 1036F TOBACCO NON-USER: CPT | Performed by: FAMILY MEDICINE

## 2021-02-02 PROCEDURE — 99213 OFFICE O/P EST LOW 20 MIN: CPT | Performed by: FAMILY MEDICINE

## 2021-02-02 NOTE — PROGRESS NOTES
COVID-19 Virtual Visit     Assessment/Plan:  1  Exposure to COVID virus on 01/29/2021  Patient quarantine until 02/09/2021  If patient develops symptoms patient to call office  COVID testing is ordered  Problem List Items Addressed This Visit     None      Visit Diagnoses     Exposure to COVID-19 virus    -  Primary    Relevant Orders    Novel Coronavirus (Covid-19),PCR [facility] - Col[facility] at   [facility]         Disposition:     I recommended self-quarantine for 10 days and to watch for symptoms until 14 days after exposure  If patient were to develop symptoms, they should self isolate and call our office for further guidance  I referred patient to one of our centralized sites for a COVID-19 swab  Patient quarantine for 10 days she will be released from quarantine 02/09/2021  Patient reports COVID testing as her employer demands is to return to work  This was ordered patient will complete the end of week  I have spent 15 minutes directly with the patient  Encounter provider Melany Jeans, DO    Provider located at [address] [place] [address] PRIMARY CARE  [facility] [id number]    Recent Visits  No visits were found meeting these conditions  Showing recent visits within past 7 days and meeting all other requirements     Today's Visits  Date Type Provider Dept   02/02/21 Telemedicine Melany Jeans, DO [facility]   Showing today's visits and meeting all other requirements     Future Appointments  No visits were found meeting these conditions  Showing future appointments within next 150 days and meeting all other requirements        Patient agrees to participate in a virtual check in via telephone or video visit instead of presenting to the office to address urgent/immediate medical needs  Patient is aware this is a billable service  After connecting through Telephone, the patient was identified by name and date of birth   Terrence Olivier Elialorena was informed that this was a telemedicine visit and that the exam was being conducted confidentially over secure lines  My office door was closed  No one else was in the room  Baldemar Botello acknowledged consent and understanding of privacy and security of the telemedicine visit  I informed the patient that I have reviewed her record in Epic and presented the opportunity for her to ask any questions regarding the visit today  The patient agreed to participate  It was my intent to perform this visit via video technology but the patient was not able to do a video connection so the visit was completed via audio telephone only  Subjective:   Baldemar Botello is a 31 y o  female who is concerned about COVID-19  Patient is currently asymptomatic  Patient denies fever, chills, fatigue, malaise, congestion, rhinorrhea, sore throat, anosmia, loss of taste, cough, shortness of breath, chest tightness, abdominal pain, nausea, vomiting, diarrhea, myalgias and headaches  Date of exposure: 1/29/2021    Exposure:   Contact with a person who is under investigation (PUI) for or who is positive for COVID-19 within the last 14 days?: Yes    Hospitalized recently for fever and/or lower respiratory symptoms?: No      Currently a healthcare worker that is involved in direct patient care?: No      Works in a special setting where the risk of COVID-19 transmission may be high? (this may include long-term care, correctional and half-way facilities; homeless shelters; assisted-living facilities and group homes ): No      Resident in a special setting where the risk of COVID-19 transmission may be high? (this may include long-term care, correctional and half-way facilities; homeless shelters; assisted-living facilities and group homes ): No      Patient was notified yesterday that co-worker she work with on Friday 01/29/2021, was COVID positive      No results found for: Beryl Coy, [address] [facility], [address]  Past Medical History:   Diagnosis Date    Acute sinusitis     24 may 2017 resolved    Breastfeeding (infant)     Dysfunction of eustachian tube     24 may 2017    unspecified laterality    Elevated blood pressure reading     2016  resolved    Gallbladder pain     "sporadic"    Gave birth to child recently     2016    IUD (intrauterine device) in place     2016  resolved    Migraine     Varicella      Past Surgical History:   Procedure Laterality Date     SECTION N/A 2016    Procedure:  SECTION (); Surgeon: Rosemarie Hernandez MD;  [facility];  Service:     CHOLECYSTECTOMY      laparoscopic    COLONOSCOPY      EAR SURGERY Bilateral     ear pressure equalization tube bilateral    GALLBLADDER SURGERY      INTRAUTERINE DEVICE INSERTION      MYRINGOTOMY W/ TUBES Left     IA  DELIVERY ONLY N/A 2020    Procedure:  SECTION () REPEAT;  Surgeon: Rosemarie Hernandez MD;  Location: [facility];  Service: Obstetrics    IA LAP,CHOLECYSTECTOMY/GRAPH N/A 11/3/2016    Procedure: LAPAROSCOPIC CHOLECYSTECTOMY WITH INTROPERATIVE CHOLANGIOGRAM; LYSIS OF ADHESIONS;  Surgeon: Hilary Bradford MD;  Location: [facility];  Service: General    WISDOM TOOTH EXTRACTION       Current Outpatient Medications   Medication Sig Dispense Refill    budesonide (RINOCORT AQUA) 32 MCG/ACT nasal spray 1 spray into each nostril daily 1 Bottle 3    doxycycline hyclate (VIBRAMYCIN) 100 mg capsule Take 1 capsule daily for 30 days 30 capsule 0    ibuprofen (MOTRIN) 600 mg tablet Take 1 tablet (600 mg total) by mouth every 6 (six) hours as needed for mild pain or moderate pain 50 tablet 0    Prenatal Vit-DSS-Fe Cbn-FA (PRENATAL AD PO) Take by mouth       No current facility-administered medications for this visit        Allergies   Allergen Reactions    Cefuroxime GI Intolerance    Rizatriptan Nausea Only       Review of Systems Constitutional: Negative for chills, fatigue and fever  HENT: Negative for congestion, rhinorrhea and sore throat  Respiratory: Negative for cough, chest tightness and shortness of breath  Gastrointestinal: Negative for abdominal pain, diarrhea, nausea and vomiting  Musculoskeletal: Negative for myalgias  Neurological: Negative for headaches  Objective: There were no vitals filed for this visit  Physical Exam  Constitutional:       Comments: No exam secondary to phone call       VIRTUAL VISIT DISCLAIMER    Liz Sandra acknowledges that she has consented to an online visit or consultation  She understands that the online visit is based solely on information provided by her, and that, in the absence of a face-to-face physical evaluation by the physician, the diagnosis she receives is both limited and provisional in terms of accuracy and completeness  This is not intended to replace a full medical face-to-face evaluation by the physician  Liz Sandra understands and accepts these terms

## 2021-02-03 ENCOUNTER — APPOINTMENT (OUTPATIENT)
Dept: LAB | Facility: HOSPITAL | Age: 31
End: 2021-02-03
Payer: COMMERCIAL

## 2021-02-03 DIAGNOSIS — Z20.822 EXPOSURE TO COVID-19 VIRUS: ICD-10-CM

## 2021-02-03 PROCEDURE — U0005 INFEC AGEN DETEC AMPLI PROBE: HCPCS

## 2021-02-03 PROCEDURE — U0003 INFECTIOUS AGENT DETECTION BY NUCLEIC ACID (DNA OR RNA); SEVERE ACUTE RESPIRATORY SYNDROME CORONAVIRUS 2 (SARS-COV-2) (CORONAVIRUS DISEASE [COVID-19]), AMPLIFIED PROBE TECHNIQUE, MAKING USE OF HIGH THROUGHPUT TECHNOLOGIES AS DESCRIBED BY CMS-2020-01-R: HCPCS

## 2021-02-04 LAB — SARS-COV-2 RNA RESP QL NAA+PROBE: NEGATIVE

## 2021-03-29 ENCOUNTER — VBI (OUTPATIENT)
Dept: ADMINISTRATIVE | Facility: OTHER | Age: 31
End: 2021-03-29

## 2021-04-09 ENCOUNTER — IMMUNIZATIONS (OUTPATIENT)
Dept: FAMILY MEDICINE CLINIC | Facility: HOSPITAL | Age: 31
End: 2021-04-09

## 2021-05-12 ENCOUNTER — RA CDI HCC (OUTPATIENT)
Dept: OTHER | Facility: HOSPITAL | Age: 31
End: 2021-05-12

## 2021-05-12 NOTE — PROGRESS NOTES
[facility]  coding opportunities          Chart reviewed, no opportunity found: CHART REVIEWED, NO OPPORTUNITY FOUND              Patients insurance company: [employer] (Medicare Advantage and Commercial)

## 2021-08-04 ENCOUNTER — VBI (OUTPATIENT)
Dept: ADMINISTRATIVE | Facility: OTHER | Age: 31
End: 2021-08-04

## 2021-10-19 ENCOUNTER — VBI (OUTPATIENT)
Dept: ADMINISTRATIVE | Facility: OTHER | Age: 31
End: 2021-10-19

## 2021-12-10 ENCOUNTER — VBI (OUTPATIENT)
Dept: ADMINISTRATIVE | Facility: OTHER | Age: 31
End: 2021-12-10

## 2021-12-13 ENCOUNTER — TELEPHONE (OUTPATIENT)
Dept: FAMILY MEDICINE CLINIC | Facility: CLINIC | Age: 31
End: 2021-12-13

## 2021-12-13 DIAGNOSIS — Z20.822 EXPOSURE TO COVID-19 VIRUS: Primary | ICD-10-CM

## 2021-12-15 PROCEDURE — U0005 INFEC AGEN DETEC AMPLI PROBE: HCPCS | Performed by: PHYSICIAN ASSISTANT

## 2021-12-15 PROCEDURE — U0003 INFECTIOUS AGENT DETECTION BY NUCLEIC ACID (DNA OR RNA); SEVERE ACUTE RESPIRATORY SYNDROME CORONAVIRUS 2 (SARS-COV-2) (CORONAVIRUS DISEASE [COVID-19]), AMPLIFIED PROBE TECHNIQUE, MAKING USE OF HIGH THROUGHPUT TECHNOLOGIES AS DESCRIBED BY CMS-2020-01-R: HCPCS | Performed by: PHYSICIAN ASSISTANT

## 2022-03-04 ENCOUNTER — VBI (OUTPATIENT)
Dept: ADMINISTRATIVE | Facility: OTHER | Age: 32
End: 2022-03-04

## 2022-03-28 ENCOUNTER — TELEPHONE (OUTPATIENT)
Dept: FAMILY MEDICINE CLINIC | Facility: CLINIC | Age: 32
End: 2022-03-28

## 2022-07-05 ENCOUNTER — TELEPHONE (OUTPATIENT)
Dept: FAMILY MEDICINE CLINIC | Facility: CLINIC | Age: 32
End: 2022-07-05

## 2022-07-05 LAB — SARS-COV-2 AG UPPER RESP QL IA: ABNORMAL

## 2022-07-05 NOTE — TELEPHONE ENCOUNTER
----- Message from Williamton D Peleschak sent at 7/5/2022 10:24 AM EDT -----  Regarding: Positive Covid Test  Attached are my at home COVID results from Sunday, 7/3

## 2022-07-05 NOTE — TELEPHONE ENCOUNTER
----- Message from Yue BRENNAN sent at 7/5/2022 11:51 AM EDT -----  Regarding: FW: Positive Covid Test    ----- Message -----  From: Luisa Bullock  Sent: 7/5/2022  10:24 AM EDT  To: [facility]  Subject: Positive Covid Test                              Attached are my at home COVID results from [place], 7/3

## 2022-07-06 ENCOUNTER — TELEMEDICINE (OUTPATIENT)
Dept: FAMILY MEDICINE CLINIC | Facility: CLINIC | Age: 32
End: 2022-07-06
Payer: COMMERCIAL

## 2022-07-06 DIAGNOSIS — R05.9 COUGH: ICD-10-CM

## 2022-07-06 DIAGNOSIS — H69.80 DYSFUNCTION OF EUSTACHIAN TUBE, UNSPECIFIED LATERALITY: ICD-10-CM

## 2022-07-06 DIAGNOSIS — U07.1 COVID-19: Primary | ICD-10-CM

## 2022-07-06 DIAGNOSIS — E66.01 MORBID OBESITY WITH BMI OF 40.0-44.9, ADULT (HCC): ICD-10-CM

## 2022-07-06 DIAGNOSIS — O13.9 GESTATIONAL HYPERTENSION, ANTEPARTUM: ICD-10-CM

## 2022-07-06 DIAGNOSIS — J30.9 ALLERGIC RHINITIS, UNSPECIFIED SEASONALITY, UNSPECIFIED TRIGGER: ICD-10-CM

## 2022-07-06 DIAGNOSIS — Z98.891 STATUS POST REPEAT LOW TRANSVERSE CESAREAN SECTION: ICD-10-CM

## 2022-07-06 DIAGNOSIS — Z13.220 SCREENING FOR LIPID DISORDERS: ICD-10-CM

## 2022-07-06 DIAGNOSIS — Z11.59 NEED FOR HEPATITIS C SCREENING TEST: ICD-10-CM

## 2022-07-06 DIAGNOSIS — Z00.00 HEALTH CARE MAINTENANCE: ICD-10-CM

## 2022-07-06 PROCEDURE — 99213 OFFICE O/P EST LOW 20 MIN: CPT | Performed by: FAMILY MEDICINE

## 2022-07-06 RX ORDER — PROMETHAZINE HYDROCHLORIDE 6.25 MG/5ML
SYRUP ORAL
Qty: 120 ML | Refills: 1 | Status: SHIPPED | OUTPATIENT
Start: 2022-07-06

## 2022-07-06 NOTE — PATIENT INSTRUCTIONS
Finish Paxlovid twice daily for 5 days  Make make nauseated  Take with food  May give bad taste in mouth  Use citrus lozenges to help   Use promethazine for cough do not drive or operate she need till side effect is known, will cause drowsiness   Use Rhinocort 1 spray both nostrils once day for allergies   May use Mucinex D for congestion  Return in 1 week if still symptoms, if symptoms worsen at to include high fever chest pain shortness of breath patient should call office report to ER   Return in 2 months for office visit and blood work sooner if needed

## 2022-07-06 NOTE — PROGRESS NOTES
COVID-19 Outpatient Progress Note    Assessment/Plan:  1  COVID-19  primary J and J  symptoms 07/03/2022   on test positive 07/03/2022  Paxlovid ordered, risk and benefit discussed especially emergency authorization use  2  Cough, promethazine ordered drowsiness discussed  3  Per allergic rhinitis  4  Eustachian tube dysfunction  Rhinocort reordered patient use Mucinex D if needed  5  BMI greater than 40, indication for Paxlovid  6  Gestational hypertension will have patient scheduled appointment in office  7  Health care maintenance routine blood work ordered to include hepatitis C screening lipid  8  Return to office in 1 week if still symptoms  9  If develops chest pain shortness of breath or high fever, patient to call office report the ER  10  Return in 2 months for office visit and blood work sooner if needed      Kathi Luis is an investigational medicine used to treat mild-to-moderate COVID-19 in adults and children [15years of age and older weighing at least 80 pounds (36 kg)] with positive results of direct SARS-CoV-2 viral testing, and who are at high risk for progression to severe COVID-19, including hospitalization or death  PAXLOVID is investigational because it is still being studied  There is limited information about the safety and effectiveness of using PAXLOVID to treat people with mild-to-moderate COVID-19  The FDA has authorized the emergency use of PAXLOVID for the treatment of mild-tomoderate COVID-19 in adults and children [15years of age and older weighing at least 80 pounds (36 kg)] with a positive test for the virus that causes COVID-19, and who are at high risk for progression to severe COVID-19, including hospitalization or death, under an EUA  What should I tell my healthcare provider before I take PAXLOVID?     Tell your healthcare provider if you:  Have any allergies  Have liver or kidney disease  Are pregnant or plan to become pregnant  Are breastfeeding a child  ave any serious illnesses    Tell your healthcare provider about all the medicines you take, including prescription and over-the-counter medicines, vitamins, and herbal supplements  Some medicines may interact with PAXLOVID and may cause serious side effects  Keep a list of your medicines to show your healthcare provider and pharmacist when you get a new medicine  You can ask your healthcare provider or pharmacist for a list of medicines that interact with PAXLOVID  Do not start taking a new medicine without telling your healthcare provider  Your healthcare provider can tell you if it is safe to take PAXLOVID with other medicines  Tell your healthcare provider if you are taking combined hormonal contraceptive  PAXLOVID may affect how your birth control pills work  Females who are able to become pregnant should use another effective alternative form of contraception or an additional barrier method of contraception  Talk to your healthcare provider if you have any questions about contraceptive methods that might be right for you  How do I take PAXLOVID? PAXLOVID consists of 2 medicines: nirmatrelvir and ritonavir  Take 2 pink tablets of nirmatrelvir with 1 white tablet of ritonavir by mouth 2 times each day (in the morning and in the evening) for 5 days  For each dose, take all 3 tablets at the same time  If you have kidney disease, talk to your healthcare provider  You may need a different dose  Swallow the tablets whole  Do not chew, break, or crush the tablets  Take PAXLOVID with or without food  Do not stop taking PAXLOVID without talking to your healthcare provider, even if you feel better  If you miss a dose of PAXLOVID within 8 hours of the time it is usually taken, take it as soon as you remember  If you miss a dose by more than 8 hours, skip the missed dose and take the next dose at your regular time  Do not take 2 doses of PAXLOVID at the same time    If you take too much PAXLOVID, call your healthcare provider or go to the nearest hospital emergency room right away  If you are taking a ritonavir- or cobicistat-containing medicine to treat hepatitis C or Human Immunodeficiency Virus (HIV), you should continue to take your medicine as prescribed by your healthcare provider  Talk to your healthcare provider if you do not feel better or if you feel worse after 5 days  Who should generally not take PAXLOVID? Do not take PAXLOVID if:  You are allergic to nirmatrelvir, ritonavir, or any of the ingredients in PAXLOVID  You are taking any of the following medicines:  Alfuzosin  Pethidine, piroxicam, propoxyphene  Ranolazine  Amiodarone, dronedarone, flecainide, propafenone, quinidine  Colchicine  Lurasidone, pimozide, clozapine  Dihydroergotamine, ergotamine, methylergonovine  Lovastatin, simvastatin  Sildenafil (Revatio®) for pulmonary arterial hypertension (PAH)  Triazolam, oral midazolam  Apalutamide  Carbamazepine, phenobarbital, phenytoin  Rifampin  St  Alirios Wort (hypericum perforatum)    What are the important possible side effects of PAXLOVID? Possible side effects of PAXLOVID are:  Liver Problems  Tell your healthcare provider right away if you have any of these signs and symptoms of liver problems: loss of appetite, yellowing of your skin and the whites of eyes (jaundice), dark-colored urine, pale colored stools and itchy skin, stomach area (abdominal) pain  Resistance to HIV Medicines  If you have untreated HIV infection, PAXLOVID may lead to some HIV medicines not working as well in the future  Other possible side effects include: altered sense of taste, diarrhea, high blood pressure, or muscle aches    These are not all the possible side effects of PAXLOVID  Not many people have taken PAXLOVID  Serious and unexpected side effects may happen  Alver Rolls is still being studied, so it is possible that all of the risks are not known at this time  What other treatment choices are there?   Like Ramirez Palomo may allow for the emergency use of other medicines to treat people with COVID-19  Go to https://alike/ for information on the emergency use of other medicines that are authorized by FDA to treat people with COVID-19  Your healthcare provider may talk with you about clinical trials for which you may be eligible  It is your choice to be treated or not to be treated with PAXLOVID  Should you decide not to receive it or for your child not to receive it, it will not change your standard medical care  What if I am pregnant or breastfeeding? There is no experience treating pregnant women or breastfeeding mothers with PAXLOVID  For a mother and unborn baby, the benefit of taking PAXLOVID may be greater than the risk from the treatment  If you are pregnant, discuss your options and specific situation with your healthcare provider  It is recommended that you use effective barrier contraception or do not have sexual activity while taking PAXLOVID  If you are breastfeeding, discuss your options and specific situation with your healthcare provider  How do I report side effects with PAXLOVID? Contact your healthcare provider if you have any side effects that bother you or do not go away  Report side effects to FDA MedWatch at www fda gov/medwatch or call 8-368-QZR2686 or you can report side effects to [employer]  at the contact information provided below  Website Fax number Telephone number   [facility] [phone]     How should I store Stormy Leash? Store PAXLOVID tablets at room temperature between 68°F to 77°F (20°C to 25°C)    Full fact sheet for patients, parents, and caregivers can be found at: Rafa knight      Problem List Items Addressed This Visit        Respiratory    Allergic rhinitis     Rhinocort reordered, may use Mucinex D           Relevant Medications    budesonide (RINOCORT AQUA) 32 MCG/ACT nasal spray    Other Relevant Orders    CBC    Comprehensive metabolic panel    Lipid Panel with Direct LDL reflex    TSH, 3rd generation with Free T4 reflex    UA (URINE) with reflex to Scope       Cardiovascular and Mediastinum    Gestational hypertension    Relevant Orders    CBC    Comprehensive metabolic panel    Lipid Panel with Direct LDL reflex    TSH, 3rd generation with Free T4 reflex    UA (URINE) with reflex to Scope       Nervous and Auditory    Eustachian tube dysfunction     As above           Relevant Medications    budesonide (RINOCORT AQUA) 32 MCG/ACT nasal spray    Other Relevant Orders    CBC    Comprehensive metabolic panel    Lipid Panel with Direct LDL reflex    TSH, 3rd generation with Free T4 reflex    UA (URINE) with reflex to Scope       Other    Health care maintenance     Blood work ordered to include hepatitis C screening           Relevant Orders    CBC    Comprehensive metabolic panel    Lipid Panel with Direct LDL reflex    TSH, 3rd generation with Free T4 reflex    UA (URINE) with reflex to Scope    Status post repeat low transverse  section    Relevant Orders    CBC    Comprehensive metabolic panel    Lipid Panel with Direct LDL reflex    TSH, 3rd generation with Free T4 reflex    UA (URINE) with reflex to Scope    Morbid obesity with BMI of 40 0-44 9, adult (HCC)     BMI 40 27, indication for Paxlovid therapy           Relevant Orders    CBC    Comprehensive metabolic panel    Lipid Panel with Direct LDL reflex    TSH, 3rd generation with Free T4 reflex    UA (URINE) with reflex to Scope      Other Visit Diagnoses     COVID-19    -  Primary    Relevant Medications    nirmatrelvir & ritonavir (Paxlovid) tablet therapy pack    promethazine (PHENERGAN) 12 5 mg/10 mL syrup    Other Relevant Orders    CBC    Comprehensive metabolic panel    Lipid Panel with Direct LDL reflex TSH, 3rd generation with Free T4 reflex    UA (URINE) with reflex to Scope    Cough        Relevant Medications    nirmatrelvir & ritonavir (Paxlovid) tablet therapy pack    promethazine (PHENERGAN) 12 5 mg/10 mL syrup    Other Relevant Orders    CBC    Comprehensive metabolic panel    Lipid Panel with Direct LDL reflex    TSH, 3rd generation with Free T4 reflex    UA (URINE) with reflex to Scope    Screening for lipid disorders        Relevant Orders    CBC    Comprehensive metabolic panel    Lipid Panel with Direct LDL reflex    TSH, 3rd generation with Free T4 reflex    UA (URINE) with reflex to Scope    Need for hepatitis C screening test        Relevant Orders    Hepatitis C Antibody ([facility])    CBC    Comprehensive metabolic panel    Lipid Panel with Direct LDL reflex    TSH, 3rd generation with Free T4 reflex    UA (URINE) with reflex to Scope         Disposition:     Patient has COVID-19 infection  Based off CDC guidelines, they were recommended to isolate for 5 days from the date of the positive test  If they remain asymptomatic, isolation may be ended followed by 5 days of wearing a mask when around othes to minimize risk of infecting others  If they have a fever, continue to stay home until fever resolves for at least 24 hours  Discussed symptom directed medication options with patient  Discussed Paxlovid risk and benefit menses authorization use  Patient agreed to therapy    Patient meets criteria for PAXLOVID and they have been counseled appropriately according to EUA documentation released by the FDA  After discussion, patient agrees to treatment  Hazen Hole is an investigational medicine used to treat mild-to-moderate COVID-19 in adults and children (15years of age and older weighing at least 80 pounds (40 kg)) with positive results of direct SARS-CoV-2 viral testing, and who are at high risk for progression to severe COVID-19, including hospitalization or death   Alfred Hole is investigational because it is still being studied  There is limited information about the safety and effectiveness of using PAXLOVID to treat people with mild-to-moderate COVID-19  The FDA has authorized the emergency use of PAXLOVID for the treatment of mild-tomoderate COVID-19 in adults and children (15years of age and older weighing at least 80 pounds (40 kg)) with a positive test for the virus that causes COVID-19, and who are at high risk for progression to severe COVID-19, including hospitalization or death, under an EUA  What should I tell my healthcare provider before I take PAXLOVID? Tell your healthcare provider if you:  - Have any allergies  - Have liver or kidney disease  - Are pregnant or plan to become pregnant  - Are breastfeeding a child  - Have any serious illnesses    Tell your healthcare provider about all the medicines you take, including prescription and over-the-counter medicines, vitamins, and herbal supplements  Some medicines may interact with PAXLOVID and may cause serious side effects  Keep a list of your medicines to show your healthcare provider and pharmacist when you get a new medicine  You can ask your healthcare provider or pharmacist for a list of medicines that interact with PAXLOVID  Do not start taking a new medicine without telling your healthcare provider  Your healthcare provider can tell you if it is safe to take PAXLOVID with other medicines  Tell your healthcare provider if you are taking combined hormonal contraceptive  PAXLOVID may affect how your birth control pills work  Females who are able to become pregnant should use another effective alternative form of contraception or an additional barrier method of contraception  Talk to your healthcare provider if you have any questions about contraceptive methods that might be right for you  How do I take PAXLOVID? PAXLOVID consists of 2 medicines: nirmatrelvir and ritonavir    - Take 2 pink tablets of nirmatrelvir with 1 white tablet of ritonavir by mouth 2 times each day (in the morning and in the evening) for 5 days  For each dose, take all 3 tablets at the same time  - If you have kidney disease, talk to your healthcare provider  You may need a different dose  - Swallow the tablets whole  Do not chew, break, or crush the tablets  - Take PAXLOVID with or without food  - Do not stop taking PAXLOVID without talking to your healthcare provider, even if you feel better  - If you miss a dose of PAXLOVID within 8 hours of the time it is usually taken, take it as soon as you remember  If you miss a dose by more than 8 hours, skip the missed dose and take the next dose at your regular time  Do not take 2 doses of PAXLOVID at the same time  - If you take too much PAXLOVID, call your healthcare provider or go to the nearest hospital emergency room right away  - If you are taking a ritonavir- or cobicistat-containing medicine to treat hepatitis C or Human Immunodeficiency Virus (HIV), you should continue to take your medicine as prescribed by your healthcare provider   - Talk to your healthcare provider if you do not feel better or if you feel worse after 5 days  Who should generally not take PAXLOVID? Do not take PAXLOVID if:  You are allergic to nirmatrelvir, ritonavir, or any of the ingredients in PAXLOVID  You are taking any of the following medicines:  - Alfuzosin  - Pethidine, piroxicam, propoxyphene  - Ranolazine  - Amiodarone, dronedarone, flecainide, propafenone, quinidine  - Colchicine  - Lurasidone, pimozide, clozapine  - Dihydroergotamine, ergotamine, methylergonovine  - Lovastatin, simvastatin  - Sildenafil (Revatio®) for pulmonary arterial hypertension (PAH)  - Triazolam, oral midazolam  - Apalutamide  - Carbamazepine, phenobarbital, phenytoin  - Rifampin  - St  Alirios Wort (hypericum perforatum)    What are the important possible side effects of PAXLOVID?     Possible side effects of PAXLOVID are:  - Liver Problems  Tell your healthcare provider right away if you have any of these signs and symptoms of liver problems: loss of appetite, yellowing of your skin and the whites of eyes (jaundice), dark-colored urine, pale colored stools and itchy skin, stomach area (abdominal) pain  - Resistance to HIV Medicines  If you have untreated HIV infection, PAXLOVID may lead to some HIV medicines not working as well in the future  - Other possible side effects include: altered sense of taste, diarrhea, high blood pressure, or muscle aches    These are not all the possible side effects of PAXLOVID  Not many people have taken PAXLOVID  Serious and unexpected side effects may happen  rBie Vallejo is still being studied, so it is possible that all of the risks are not known at this time  What other treatment choices are there? Like Ghulam Husbands may allow for the emergency use of other medicines to treat people with COVID-19  Go to https://mSpoke/ for information on the emergency use of other medicines that are authorized by FDA to treat people with COVID-19  Your healthcare provider may talk with you about clinical trials for which you may be eligible  It is your choice to be treated or not to be treated with PAXLOVID  Should you decide not to receive it or for your child not to receive it, it will not change your standard medical care  What if I am pregnant or breastfeeding? There is no experience treating pregnant women or breastfeeding mothers with PAXLOVID  For a mother and unborn baby, the benefit of taking PAXLOVID may be greater than the risk from the treatment  If you are pregnant, discuss your options and specific situation with your healthcare provider  It is recommended that you use effective barrier contraception or do not have sexual activity while taking PAXLOVID      If you are breastfeeding, discuss your options and specific situation with your healthcare provider  How do I report side effects with PAXLOVID? Contact your healthcare provider if you have any side effects that bother you or do not go away  Report side effects to FDA MedWatch at www fda gov/medwatch or call 3-711-SVW2725 or you can report side effects to [employer]  at the contact information provided below  Website Fax number Telephone number   [facility] [phone]     How should I store Aminah Kale? Store PAXLOVID tablets at room temperature between 68°F to 77°F (20°C to 25°C)  Full fact sheet for patients, parents, and caregivers can be found at: Rafa knight    I have spent 15 minutes directly with the patient  Encounter provider Pearl Stewart DO    Provider located at [address] [place] [id number] [phone]    Recent Visits  Date Type Provider Dept   07/05/22 Telephone Pearl Stewart DO [facility]   07/05/22 Telephone Cosme Lopez [address] [place] [address] Primary Care   Showing recent visits within past 7 days and meeting all other requirements  Today's Visits  Date Type Provider Dept   07/06/22 Telemedicine Pearl Stewart DO [facility] Primary Care   Showing today's visits and meeting all other requirements  Future Appointments  No visits were found meeting these conditions  Showing future appointments within next 150 days and meeting all other requirements     This virtual check-in was done via telephone and she agrees to proceed  Patient agrees to participate in a virtual check in via telephone or video visit instead of presenting to the office to address urgent/immediate medical needs  Patient is aware this is a billable service  After connecting through Telephone, the patient was identified by name and date of birth   Enio Emmanuel was informed that this was a telemedicine visit and that the exam was being conducted confidentially over secure lines  My office door was closed  No one else was in the room  Allyson Negrete acknowledged consent and understanding of privacy and security of the telemedicine visit  I informed the patient that I have reviewed her record in Epic and presented the opportunity for her to ask any questions regarding the visit today  The patient agreed to participate  It was my intent to perform this visit via video technology but the patient was not able to do a video connection so the visit was completed via audio telephone only  Verification of patient location:  Patient is located in the following state in which I hold an active license: [place]    Subjective:   Allyson Negrete is a 34 y o  female who has been screened for COVID-19  Symptom change since last report: improving  Patient's symptoms include nasal congestion and cough  Patient denies fever, chills, fatigue, malaise, rhinorrhea, sore throat, anosmia, loss of taste, shortness of breath, chest tightness, abdominal pain, nausea, vomiting, diarrhea, myalgias and headaches  - Date of symptom onset: 7/3/2022  - Date of positive COVID-19 test: 7/3/2022  Type of test: Home antigen  Home antigen result verified by provider  Will get picture of test uploaded/scanned into patient's chart  COVID-19 vaccination status: Fully vaccinated (primary series)    Cassie Ivey has been staying home and has isolated themselves in her home  She is taking care to not share personal items and is cleaning all surfaces that are touched often, like counters, tabletops, and doorknobs using household cleaning sprays or wipes  She is wearing a mask when she leaves her room  Patient started Sunday 07/03/2022 with head congestion mild cough  Tested positive at home  Patient is slowly improving      Lab Results   Component Value Date    SARSCOV2 Negative 12/15/2021 [facility] Positive (Patient Reported) (A) 2022     Past Medical History:   Diagnosis Date    Acute sinusitis     24 may 2017 resolved    Breastfeeding (infant)     Dysfunction of eustachian tube     24 may 2017    unspecified laterality    Elevated blood pressure reading     2016  resolved    Gallbladder pain     "sporadic"    Gave birth to child recently     2016    IUD (intrauterine device) in place     2016  resolved    Migraine     Varicella      Past Surgical History:   Procedure Laterality Date     SECTION N/A 2016    Procedure:  SECTION (); Surgeon: Sandip Iraheta MD;  Location: [facility];  Service:     CHOLECYSTECTOMY      laparoscopic    COLONOSCOPY      EAR SURGERY Bilateral     ear pressure equalization tube bilateral    GALLBLADDER SURGERY      INTRAUTERINE DEVICE INSERTION      MYRINGOTOMY W/ TUBES Left     ID  DELIVERY ONLY N/A 2020    Procedure:  SECTION () REPEAT;  Surgeon: Sandip Iraheta MD;  Location: [facility];  Service: Obstetrics    ID LAP,CHOLECYSTECTOMY/GRAPH N/A 11/3/2016    Procedure: LAPAROSCOPIC CHOLECYSTECTOMY WITH INTROPERATIVE CHOLANGIOGRAM; LYSIS OF ADHESIONS;  Surgeon: Carlton Aguilera MD;  Location: [facility];  Service: General    WISDOM TOOTH EXTRACTION       Current Outpatient Medications   Medication Sig Dispense Refill    budesonide (RINOCORT AQUA) 32 MCG/ACT nasal spray 1 spray into each nostril daily 5 mL 5    nirmatrelvir & ritonavir (Paxlovid) tablet therapy pack Take 3 tablets by mouth 2 (two) times a day for 5 days Take 2 nirmatrelvir tablets + 1 ritonavir tablet together per dose 30 tablet 0    promethazine (PHENERGAN) 12 5 mg/10 mL syrup Take 1 tsp 4 times daily and 2 tsp at bedtime as needed for cough 120 mL 1     No current facility-administered medications for this visit       Allergies   Allergen Reactions    Cefuroxime GI Intolerance    Rizatriptan Nausea Only Review of Systems   Constitutional: Negative for chills, fatigue and fever  HENT: Positive for congestion  Negative for rhinorrhea and sore throat  Respiratory: Positive for cough  Negative for chest tightness and shortness of breath  Gastrointestinal: Negative for abdominal pain, diarrhea, nausea and vomiting  Musculoskeletal: Negative for myalgias  Neurological: Negative for headaches  Objective: There were no vitals filed for this visit  Physical Exam  Constitutional:       Comments: Phone call, no exam         VIRTUAL VISIT DISCLAIMER    Kalyan Senior verbally agrees to participate in [facility]  Pt is aware that [facility] could be limited without vital signs or the ability to perform a full hands-on physical exam  Marianna Calabrese understands she or the provider may request at any time to terminate the video visit and request the patient to seek care or treatment in person

## 2022-08-05 ENCOUNTER — VBI (OUTPATIENT)
Dept: ADMINISTRATIVE | Facility: OTHER | Age: 32
End: 2022-08-05

## 2022-08-08 ENCOUNTER — OFFICE VISIT (OUTPATIENT)
Dept: BARIATRICS | Facility: CLINIC | Age: 32
End: 2022-08-08
Payer: COMMERCIAL

## 2022-08-08 VITALS
DIASTOLIC BLOOD PRESSURE: 70 MMHG | SYSTOLIC BLOOD PRESSURE: 124 MMHG | BODY MASS INDEX: 38.57 KG/M2 | WEIGHT: 240 LBS | RESPIRATION RATE: 16 BRPM | HEART RATE: 90 BPM | HEIGHT: 66 IN

## 2022-08-08 DIAGNOSIS — E66.9 OBESITY, CLASS II, BMI 35-39.9: Primary | ICD-10-CM

## 2022-08-08 PROCEDURE — 99203 OFFICE O/P NEW LOW 30 MIN: CPT | Performed by: PHYSICIAN ASSISTANT

## 2022-08-08 PROCEDURE — 3725F SCREEN DEPRESSION PERFORMED: CPT | Performed by: PHYSICIAN ASSISTANT

## 2022-08-08 NOTE — PATIENT INSTRUCTIONS
Follow up in approximately 2 months   Goals:  Food log (ie ) www myfitnesspal com,sparkpeople  com,loseit com,calorieking  com,etc  baritastic  No sugary beverages  At least 64oz of water daily  Increase physical activity by 10 minutes daily   Gradually increase physical activity to a goal of 5 days per week for 30 minutes of MODERATE intensity PLUS 2 days per week of FULL BODY resistance training  5-10 servings of fruits and vegetables per day and 25-35 grams of dietary fiber per day, gradually increasing  0221-6700 calories per day

## 2022-08-08 NOTE — PROGRESS NOTES
-Discussed options of HealthyCORE-Intensive Lifestyle Intervention Program, Very Low Calorie Diet-VLCD and Conservative Program and the role of weight loss medications   -Initial weight loss goal of 5-10% weight loss for improved health  -Screening labs ordered by PCP   -Patient is interested in pursuing Conservative Program  May be interested in adding RD visit moving forward  Assessment/Plan:    Follow up in approximately 2 months   Goals:  Food log (ie ) www myfitnesspal com,sparkpeople  com,loseit com,calorieking  com,etc  baritastic  No sugary beverages  At least 64oz of water daily  Increase physical activity by 10 minutes daily  Gradually increase physical activity to a goal of 5 days per week for 30 minutes of MODERATE intensity PLUS 2 days per week of FULL BODY resistance training  5-10 servings of fruits and vegetables per day and 25-35 grams of dietary fiber per day, gradually increasing  7545-1140 calories per day     Diagnoses and all orders for this visit:    Obesity, Class II, BMI 35-39 9    Other orders  -     Multiple Vitamins-Minerals (ONE-A-DAY WOMENS PO); Take by mouth        Subjective:   Chief Complaint   Patient presents with    Consult     MWM consult; waist 41in; goal wt 175; stop bang 2-8     Patient ID: Crescencio Head  is a 34 y o  female with excess weight/obesity here to pursue weight management  Past Medical History:   Diagnosis Date    Acute sinusitis     24 may 2017 resolved    Breastfeeding (infant)     Dysfunction of eustachian tube     24 may 2017    unspecified laterality    Elevated blood pressure reading     25 jul 2016  resolved    Gallbladder pain     "sporadic"    Gave birth to child recently     august 25, 2016    IUD (intrauterine device) in place     21 nov 2016  resolved    Migraine     Varicella      HPI:  Obesity/Excess Weight:  Severity: clas II  Onset:   In 2016 with first pregnancy - gained 50 lbs   Modifiers: Diet and Exercise  Contributing factors: Poor Food Choices, Insufficient Physical Activity and Pregnancy  Associated symptoms: decreased exercise capacity, decreased self esteem and increased shortness of breath  Colonoscopy-Not applicable    Goals: under 200 lbs   STOPBAN/8    The following portions of the patient's history were reviewed and updated as appropriate: allergies, current medications, past family history, past medical history, past social history, past surgical history and problem list     Review of Systems   Constitutional: Negative  Respiratory: Negative  Cardiovascular: Negative  Gastrointestinal: Negative  Neurological: Negative  Psychiatric/Behavioral: Negative  Objective:    /70   Pulse 90   Resp 16   Ht 5' 6" (1 676 m)   Wt 109 kg (240 lb)   Breastfeeding No   BMI 38 74 kg/m²     Physical Exam  Vitals and nursing note reviewed  Constitutional:       Appearance: Normal appearance  She is obese  HENT:      Head: Normocephalic and atraumatic  Eyes:      Extraocular Movements: Extraocular movements intact  Pupils: Pupils are equal, round, and reactive to light  Cardiovascular:      Rate and Rhythm: Normal rate and regular rhythm  Pulmonary:      Effort: Pulmonary effort is normal       Breath sounds: Normal breath sounds  Musculoskeletal:         General: Normal range of motion  Cervical back: Normal range of motion  Skin:     General: Skin is warm and dry  Neurological:      General: No focal deficit present  Mental Status: She is alert and oriented to person, place, and time     Psychiatric:         Mood and Affect: Mood normal

## 2022-11-27 ENCOUNTER — OFFICE VISIT (OUTPATIENT)
Dept: URGENT CARE | Age: 32
End: 2022-11-27

## 2022-11-27 VITALS
DIASTOLIC BLOOD PRESSURE: 76 MMHG | HEART RATE: 86 BPM | RESPIRATION RATE: 20 BRPM | TEMPERATURE: 98 F | WEIGHT: 240.3 LBS | OXYGEN SATURATION: 100 % | HEIGHT: 66 IN | SYSTOLIC BLOOD PRESSURE: 120 MMHG | BODY MASS INDEX: 38.62 KG/M2

## 2022-11-27 DIAGNOSIS — J02.0 STREP THROAT: Primary | ICD-10-CM

## 2022-11-27 LAB — S PYO AG THROAT QL: POSITIVE

## 2022-11-27 RX ORDER — ACETAMINOPHEN AND CODEINE PHOSPHATE 300; 30 MG/1; MG/1
1 TABLET ORAL EVERY 8 HOURS PRN
COMMUNITY
End: 2022-11-29 | Stop reason: SDUPTHER

## 2022-11-27 RX ORDER — AMOXICILLIN 500 MG/1
1000 CAPSULE ORAL EVERY 24 HOURS
Qty: 20 CAPSULE | Refills: 0 | Status: SHIPPED | OUTPATIENT
Start: 2022-11-27 | End: 2022-11-29

## 2022-11-27 NOTE — LETTER
November 27, 2022     Patient: Matilda Sweet   YOB: 1990   Date of Visit: 11/27/2022       To Whom it May Concern:    Eligio Leslie was seen in my clinic on 11/27/2022  She may return to work on 11/29/2022  If you have any questions or concerns, please don't hesitate to call           Sincerely,          Ko Schneider PA-C        CC: No Recipients

## 2022-11-27 NOTE — PATIENT INSTRUCTIONS
Take antibiotic as directed until completed  Motrin and Tylenol as needed for fevers and pain  Follow up with PCP in 3-5 days  Proceed to  ER if symptoms worsen  Strep Throat   AMBULATORY CARE:   Strep throat  is a throat infection caused by bacteria  It is easily spread from person to person  Common symptoms include the following:   Sore, red, and swollen throat    Fever and headache     Upset stomach, abdominal pain, or vomiting    White or yellow patches or blisters in the back of your throat    Tender, swollen lumps on the sides of your neck or jaw    Throat pain when you swallow    Call 911 for any of the following: You have trouble breathing  Seek care immediately if:   You have new symptoms like a bad headache, stiff neck, chest pain, or vomiting  You are drooling because you cannot swallow your spit  Contact your healthcare provider if:   You have a fever  You have a rash or ear pain  You have green, yellow-brown, or bloody mucus when you cough or blow your nose  You are unable to drink anything  You have questions or concerns about your condition or care  Treatment for strep throat  may include antibiotic medicine to treat your strep throat  You should feel better within 2 to 3 days after you start antibiotics  You may return to work or school 24 hours after you start antibiotics  Manage strep throat:   Use lozenges, ice, soft foods, or popsicles  to soothe your throat  Drink juice, milk shakes, or soup  if your throat is too sore to eat solid food  Drinking liquids can also help prevent dehydration  Gargle with salt water  Mix ¼ teaspoon salt in a glass of warm water and gargle  This may help reduce swelling in your throat  Do not smoke  Nicotine and other chemicals in cigarettes and cigars can cause lung damage and make your symptoms worse  Ask your healthcare provider for information if you currently smoke and need help to quit   E-cigarettes or smokeless tobacco still contain nicotine  Talk to your healthcare provider before you use these products  Prevent the spread of strep throat:   Wash your hands often  Use soap and water  Wash your hands after you use the bathroom, change a child's diapers, or sneeze  Wash your hands before you prepare or eat food  Do not share food or drinks  Replace your toothbrush after you have taken antibiotics for 24 hours  Follow up with your doctor as directed:  Write down your questions so you remember to ask them during your visits  © Copyright [employer] 2022 Information is for End User's use only and may not be sold, redistributed or otherwise used for commercial purposes  All illustrations and images included in CareNotes® are the copyrighted property of A D A M , Inc  or [facility] Maida Elise   The above information is an  only  It is not intended as medical advice for individual conditions or treatments  Talk to your doctor, nurse or pharmacist before following any medical regimen to see if it is safe and effective for you

## 2022-11-27 NOTE — PROGRESS NOTES
[facility]        NAME: Lazarus Kelly is a 34 y o  female  : 1990    MRN: [id number]  DATE: 2022  TIME: 3:36 PM    Assessment and Plan   Strep throat [J02 0]  1  Strep throat  POCT rapid strepA    amoxicillin (AMOXIL) 500 mg capsule            Patient Instructions     Take antibiotic as directed until completed  Motrin and Tylenol as needed for fevers and pain  Follow up with PCP in 3-5 days  Proceed to  ER if symptoms worsen  Chief Complaint     Chief Complaint   Patient presents with   • Sore Throat     Fever, x2days         History of Present Illness       26-year-old female presents with sore throat fevers for the past 2 days  Reports has been has similar symptoms  Denies any chest pain shortness of breath or cough  No abdominal pain nausea vomiting or diarrhea  No headaches reported  Sore Throat   This is a new problem  The current episode started yesterday  The problem has been waxing and waning  The maximum temperature recorded prior to her arrival was 101 - 101 9 F  The pain is moderate  Associated symptoms include trouble swallowing  Pertinent negatives include no abdominal pain, congestion, coughing, hoarse voice, stridor or swollen glands  She has tried cool liquids and acetaminophen for the symptoms  The treatment provided mild relief  Review of Systems   Review of Systems   Constitutional: Negative  HENT: Positive for sore throat and trouble swallowing  Negative for congestion and hoarse voice  Eyes: Negative  Respiratory: Negative  Negative for cough and stridor  Cardiovascular: Negative  Gastrointestinal: Negative  Negative for abdominal pain  Musculoskeletal: Negative  Skin: Negative  Neurological: Negative            Current Medications       Current Outpatient Medications:   •  acetaminophen-codeine (TYLENOL #3) 300-30 mg per tablet, Take 1 tablet by mouth every 8 (eight) hours as needed for moderate pain (for migraines), Disp: , Rfl:   •  amoxicillin (AMOXIL) 500 mg capsule, Take 2 capsules (1,000 mg total) by mouth every 24 hours for 10 days, Disp: 20 capsule, Rfl: 0  •  budesonide (RINOCORT AQUA) 32 MCG/ACT nasal spray, 1 spray into each nostril daily, Disp: 5 mL, Rfl: 5  •  Multiple Vitamins-Minerals (ONE-A-DAY WOMENS PO), Take by mouth, Disp: , Rfl:   •  promethazine (PHENERGAN) 12 5 mg/10 mL syrup, Take 1 tsp 4 times daily and 2 tsp at bedtime as needed for cough (Patient not taking: Reported on 2022), Disp: 120 mL, Rfl: 1    Current Allergies     Allergies as of 2022 - Reviewed 2022   Allergen Reaction Noted   • Cefuroxime GI Intolerance 2012   • Rizatriptan Nausea Only 2012            The following portions of the patient's history were reviewed and updated as appropriate: allergies, current medications, past family history, past medical history, past social history, past surgical history and problem list      Past Medical History:   Diagnosis Date   • Acute sinusitis     24 may 2017 resolved   • Breastfeeding (infant)    • Dysfunction of eustachian tube     24 may 2017    unspecified laterality   • Elevated blood pressure reading     2016  resolved   • Gallbladder pain     "sporadic"   • Gave birth to child recently     2016   • IUD (intrauterine device) in place     2016  resolved   • Migraine    • Varicella        Past Surgical History:   Procedure Laterality Date   •  SECTION N/A 2016    Procedure:  SECTION ();   Surgeon: Xuan Cali MD;  Location: [facility];  Service:    • CHOLECYSTECTOMY      laparoscopic   • COLONOSCOPY     • EAR SURGERY Bilateral     ear pressure equalization tube bilateral   • GALLBLADDER SURGERY     • INTRAUTERINE DEVICE INSERTION     • MYRINGOTOMY W/ TUBES Left    • MI  DELIVERY ONLY N/A 2020    Procedure:  SECTION () REPEAT;  Surgeon: Xuan Cali MD;  Location: [facility]; Service: Obstetrics   • WA LAP,CHOLECYSTECTOMY/GRAPH N/A 11/3/2016    Procedure: LAPAROSCOPIC CHOLECYSTECTOMY WITH INTROPERATIVE CHOLANGIOGRAM; LYSIS OF ADHESIONS;  Surgeon: Jeremy Ferraro MD;  Location: [facility];  Service: General   • WISDOM TOOTH EXTRACTION         Family History   Problem Relation Age of Onset   • Breast cancer Mother    • Heart attack Father    • Heart disease Father    • Heart attack Paternal Grandfather    • Heart disease Paternal Grandfather    • Breast cancer Maternal Aunt          Medications have been verified  Objective   /76   Pulse 86   Temp 98 °F (36 7 °C)   Resp 20   Ht 5' 6" (1 676 m)   Wt 109 kg (240 lb 4 8 oz)   SpO2 100%   BMI 38 79 kg/m²   No LMP recorded  Patient has had an implant  Physical Exam     Physical Exam  Vitals and nursing note reviewed  Constitutional:       General: She is not in acute distress  Appearance: Normal appearance  She is well-developed  HENT:      Head: Normocephalic and atraumatic  Right Ear: Hearing, tympanic membrane, ear canal and external ear normal  There is no impacted cerumen  Left Ear: Hearing, tympanic membrane, ear canal and external ear normal  There is no impacted cerumen  Nose: Nose normal       Mouth/Throat:      Pharynx: Uvula midline  Oropharyngeal exudate and posterior oropharyngeal erythema present  Eyes:      General:         Right eye: No discharge  Left eye: No discharge  Conjunctiva/sclera: Conjunctivae normal    Cardiovascular:      Rate and Rhythm: Normal rate and regular rhythm  Heart sounds: Normal heart sounds  No murmur heard  Pulmonary:      Effort: Pulmonary effort is normal  No respiratory distress  Breath sounds: Normal breath sounds  No wheezing or rales  Abdominal:      General: Bowel sounds are normal       Palpations: Abdomen is soft  Tenderness: There is no abdominal tenderness     Musculoskeletal:         General: Normal range of motion  Cervical back: Normal range of motion and neck supple  Lymphadenopathy:      Cervical: No cervical adenopathy  Skin:     General: Skin is warm and dry  Neurological:      Mental Status: She is alert and oriented to person, place, and time     Psychiatric:         Mood and Affect: Mood normal

## 2022-11-29 ENCOUNTER — OFFICE VISIT (OUTPATIENT)
Dept: FAMILY MEDICINE CLINIC | Facility: CLINIC | Age: 32
End: 2022-11-29

## 2022-11-29 VITALS
BODY MASS INDEX: 38.57 KG/M2 | WEIGHT: 240 LBS | RESPIRATION RATE: 16 BRPM | TEMPERATURE: 97.9 F | SYSTOLIC BLOOD PRESSURE: 118 MMHG | HEIGHT: 66 IN | HEART RATE: 68 BPM | DIASTOLIC BLOOD PRESSURE: 72 MMHG

## 2022-11-29 DIAGNOSIS — J02.9 SORE THROAT: Primary | ICD-10-CM

## 2022-11-29 DIAGNOSIS — M79.10 MYALGIA: ICD-10-CM

## 2022-11-29 DIAGNOSIS — O13.9 GESTATIONAL HYPERTENSION, ANTEPARTUM: ICD-10-CM

## 2022-11-29 DIAGNOSIS — J30.9 ALLERGIC RHINITIS, UNSPECIFIED SEASONALITY, UNSPECIFIED TRIGGER: ICD-10-CM

## 2022-11-29 DIAGNOSIS — R53.83 OTHER FATIGUE: ICD-10-CM

## 2022-11-29 DIAGNOSIS — R50.9 FEVER AND CHILLS: ICD-10-CM

## 2022-11-29 DIAGNOSIS — G43.909 MIGRAINE SYNDROME: ICD-10-CM

## 2022-11-29 DIAGNOSIS — J02.0 STREP PHARYNGITIS: ICD-10-CM

## 2022-11-29 DIAGNOSIS — H69.80 DYSFUNCTION OF EUSTACHIAN TUBE, UNSPECIFIED LATERALITY: ICD-10-CM

## 2022-11-29 RX ORDER — AZITHROMYCIN 250 MG/1
TABLET, FILM COATED ORAL
Qty: 6 TABLET | Refills: 0 | Status: SHIPPED | OUTPATIENT
Start: 2022-11-29 | End: 2022-12-04

## 2022-11-29 RX ORDER — ACETAMINOPHEN AND CODEINE PHOSPHATE 300; 30 MG/1; MG/1
1 TABLET ORAL EVERY 8 HOURS PRN
Qty: 30 TABLET | Refills: 0 | Status: SHIPPED | OUTPATIENT
Start: 2022-11-29

## 2022-11-29 NOTE — PROGRESS NOTES
Name: Lake Garay      : 1990      MRN: [id number]  Encounter Provider: Saurav Forrester DO  Encounter Date: 2022   Encounter department: [facility]    Assessment & Plan     1  Sore throat  2  Fever chills  3  Fatigue  4  Myalgia  Strep/COVID swab sent to lab I recommend to be in isolation to report is known  5  Strep pharyngitis, question resistant strep, discontinue amoxicillin changed to Z-Davi  6  Per allergic rhinitis/eustachian tube dysfunction, continue Rhinocort  7  Migraine headache, Tylenol with codeine reordered  She has gone through less than 30 in over year not continuous  PDMP reordered   8  Return in 1 week if still symptoms           1  Sore throat  -     Covid/Flu- Office Collect    2  Strep pharyngitis  -     azithromycin (ZITHROMAX) 250 mg tablet; Take 2 tablets today then 1 tablet daily till finished    3  Fever and chills    4  Other fatigue    5  Myalgia    6  Migraine syndrome  Assessment & Plan:  Uses Tylenol with codeine extremely sparingly 30 tablets/return over a year  This is not a continuous narcotic therapy    Orders:  -     acetaminophen-codeine (TYLENOL #3) 300-30 mg per tablet; Take 1 tablet by mouth every 8 (eight) hours as needed for moderate pain (for migraines)    7  Allergic rhinitis, unspecified seasonality, unspecified trigger  Assessment & Plan:  Continue Rhinocort      8  Gestational hypertension, antepartum  Assessment & Plan:  Blood pressure normal      9  Dysfunction of Eustachian tube, unspecified laterality  Assessment & Plan:  As above          Depression Screening and Follow-up Plan: Patient was screened for depression during today's encounter  They screened negative with a PHQ-2 score of 0  Subjective      Patient started less past Thursday with a sore throat    On  got worse was seen at urgent care strep rapid was positive placed on amoxicillin patient is not better still has fever chills myalgias fatigue and sore throat  Review of Systems   Constitutional:        HPI   HENT:        HPI   Eyes: Negative  Respiratory: Negative  Cardiovascular: Negative  Gastrointestinal: Negative  Endocrine: Negative  Genitourinary: Negative  Musculoskeletal: Negative  Skin: Negative  Allergic/Immunologic: Negative  Neurological:        Uses Tylenol with codeine very sparingly has not had a prescription over a year for her migraines  Would like a refill   Hematological: Negative  Psychiatric/Behavioral: Negative  Current Outpatient Medications on File Prior to Visit   Medication Sig   • budesonide (RINOCORT AQUA) 32 MCG/ACT nasal spray 1 spray into each nostril daily   • Multiple Vitamins-Minerals (ONE-A-DAY WOMENS PO) Take by mouth   • [DISCONTINUED] acetaminophen-codeine (TYLENOL #3) 300-30 mg per tablet Take 1 tablet by mouth every 8 (eight) hours as needed for moderate pain (for migraines)   • [DISCONTINUED] amoxicillin (AMOXIL) 500 mg capsule Take 2 capsules (1,000 mg total) by mouth every 24 hours for 10 days   • [DISCONTINUED] promethazine (PHENERGAN) 12 5 mg/10 mL syrup Take 1 tsp 4 times daily and 2 tsp at bedtime as needed for cough (Patient not taking: Reported on 11/29/2022)       Objective     /72 (BP Location: Left arm, Patient Position: Sitting, Cuff Size: Large)   Pulse 68   Temp 97 9 °F (36 6 °C)   Resp 16   Ht 5' 6" (1 676 m)   Wt 109 kg (240 lb)   BMI 38 74 kg/m²     Physical Exam  Vitals and nursing note reviewed  Constitutional:       Appearance: Normal appearance  HENT:      Head: Normocephalic and atraumatic  Right Ear: Tympanic membrane normal       Left Ear: Tympanic membrane normal       Nose: Nose normal       Mouth/Throat:      Comments: Positive erythema and mild tonsillar hypertrophy left tonsillar exudate  Eyes:      General: No scleral icterus    Neck:      Comments: Positive shotty tender anterior cervical nodes  Cardiovascular: Rate and Rhythm: Normal rate and regular rhythm  Heart sounds: Normal heart sounds  Pulmonary:      Effort: Pulmonary effort is normal       Breath sounds: Normal breath sounds  Musculoskeletal:      Cervical back: Neck supple  Tenderness present  No rigidity  Lymphadenopathy:      Cervical: Cervical adenopathy present  Skin:     General: Skin is warm and dry  Neurological:      General: No focal deficit present  Mental Status: She is alert     Psychiatric:         Mood and Affect: Mood normal        Tom Bennett DO

## 2022-11-29 NOTE — ASSESSMENT & PLAN NOTE
Uses Tylenol with codeine extremely sparingly 30 tablets/return over a year    This is not a continuous narcotic therapy

## 2022-11-29 NOTE — PATIENT INSTRUCTIONS
Discontinue amoxicillin   Start Zithromax   I recommend gargling every 3-4 hours you may use salt water or mouthwash  I recommend over-the-counter throat lozenge   Continue Rhinocort therapy   Keep isolated to report of Marjorie [place] is known  Return in 1 week if still symptoms

## 2022-11-30 LAB
FLUAV RNA RESP QL NAA+PROBE: NEGATIVE
FLUBV RNA RESP QL NAA+PROBE: NEGATIVE
SARS-COV-2 RNA RESP QL NAA+PROBE: NEGATIVE

## 2022-12-01 ENCOUNTER — TELEPHONE (OUTPATIENT)
Dept: FAMILY MEDICINE CLINIC | Facility: CLINIC | Age: 32
End: 2022-12-01

## 2022-12-13 ENCOUNTER — VBI (OUTPATIENT)
Dept: ADMINISTRATIVE | Facility: OTHER | Age: 32
End: 2022-12-13

## 2023-01-12 ENCOUNTER — VBI (OUTPATIENT)
Dept: ADMINISTRATIVE | Facility: OTHER | Age: 33
End: 2023-01-12

## 2023-05-11 ENCOUNTER — VBI (OUTPATIENT)
Dept: ADMINISTRATIVE | Facility: OTHER | Age: 33
End: 2023-05-11

## 2023-08-15 ENCOUNTER — VBI (OUTPATIENT)
Dept: ADMINISTRATIVE | Facility: OTHER | Age: 33
End: 2023-08-15

## 2023-12-15 ENCOUNTER — VBI (OUTPATIENT)
Dept: ADMINISTRATIVE | Facility: OTHER | Age: 33
End: 2023-12-15

## 2024-02-21 PROBLEM — Z00.00 HEALTH CARE MAINTENANCE: Status: RESOLVED | Noted: 2019-01-21 | Resolved: 2024-02-21

## 2024-05-30 ENCOUNTER — VBI (OUTPATIENT)
Dept: ADMINISTRATIVE | Facility: OTHER | Age: 34
End: 2024-05-30

## 2024-08-13 ENCOUNTER — VBI (OUTPATIENT)
Dept: ADMINISTRATIVE | Facility: OTHER | Age: 34
End: 2024-08-13

## 2024-08-13 NOTE — TELEPHONE ENCOUNTER
08/13/24 3:06 PM     Chart reviewed for Pap Smear (HPV) aka Cervical Cancer Screening ; nothing is submitted to the patient's insurance at this time.     Yolie Saravia MA   [facility]

## 2024-12-26 ENCOUNTER — VBI (OUTPATIENT)
Dept: ADMINISTRATIVE | Facility: OTHER | Age: 34
End: 2024-12-26

## 2024-12-26 NOTE — TELEPHONE ENCOUNTER
12/26/24 5:26 PM     Chart reviewed for Pap Smear (HPV) aka Cervical Cancer Screening ; nothing is submitted to the patient's insurance at this time.     Yolie Saravia MA   [facility]

## 2025-05-18 ENCOUNTER — OFFICE VISIT (OUTPATIENT)
Dept: URGENT CARE | Age: 35
End: 2025-05-18
Payer: COMMERCIAL

## 2025-05-18 VITALS
TEMPERATURE: 97.6 F | HEIGHT: 65 IN | SYSTOLIC BLOOD PRESSURE: 124 MMHG | BODY MASS INDEX: 40.98 KG/M2 | HEART RATE: 73 BPM | RESPIRATION RATE: 16 BRPM | DIASTOLIC BLOOD PRESSURE: 80 MMHG | WEIGHT: 246 LBS | OXYGEN SATURATION: 97 %

## 2025-05-18 DIAGNOSIS — J02.9 ACUTE PHARYNGITIS, UNSPECIFIED ETIOLOGY: Primary | ICD-10-CM

## 2025-05-18 LAB — S PYO AG THROAT QL: NEGATIVE

## 2025-05-18 PROCEDURE — 87880 STREP A ASSAY W/OPTIC: CPT | Performed by: PHYSICIAN ASSISTANT

## 2025-05-18 PROCEDURE — 87070 CULTURE OTHR SPECIMN AEROBIC: CPT | Performed by: PHYSICIAN ASSISTANT

## 2025-05-18 PROCEDURE — S9083 URGENT CARE CENTER GLOBAL: HCPCS | Performed by: PHYSICIAN ASSISTANT

## 2025-05-18 PROCEDURE — G0382 LEV 3 HOSP TYPE B ED VISIT: HCPCS | Performed by: PHYSICIAN ASSISTANT

## 2025-05-18 RX ORDER — CLINDAMYCIN HYDROCHLORIDE 300 MG/1
300 CAPSULE ORAL 4 TIMES DAILY
Qty: 40 CAPSULE | Refills: 0 | Status: SHIPPED | OUTPATIENT
Start: 2025-05-18 | End: 2025-05-28

## 2025-05-18 NOTE — PROGRESS NOTES
"[facility]        NAME: Kelsey Diane Peleschak is a 35 y.o. female  : 1990    MRN: [id number]  DATE: May 18, 2025  TIME: 8:29 AM    /80   Pulse 73   Temp 97.6 °F (36.4 °C)   Resp 16   Ht 5' 5\" (1.651 m)   Wt 112 kg (246 lb)   SpO2 97%   BMI 40.94 kg/m²     Assessment and Plan   Acute pharyngitis, unspecified etiology [J02.9]  1. Acute pharyngitis, unspecified etiology  POCT rapid ANTIGEN strepA    clindamycin (CLEOCIN) 300 MG capsule            Patient Instructions       Follow up with PCP in 3-5 days.  Proceed to  ER if symptoms worsen.    Chief Complaint     Chief Complaint   Patient presents with    Sore Throat     Patient reports sore throat X 1 day. Denies any other symptoms.         History of Present Illness       Pt with sore throat since last night     Sore Throat         Review of Systems   Review of Systems   Constitutional: Negative.    HENT:  Positive for sore throat.    Eyes: Negative.    Respiratory: Negative.     Cardiovascular: Negative.    Gastrointestinal: Negative.    Endocrine: Negative.    Genitourinary: Negative.    Musculoskeletal: Negative.    Skin: Negative.    Allergic/Immunologic: Negative.    Neurological: Negative.    Hematological: Negative.    Psychiatric/Behavioral: Negative.     All other systems reviewed and are negative.        Current Medications     Current Medications[1]    Current Allergies     Allergies as of 2025 - Reviewed 2025   Allergen Reaction Noted    Cefuroxime GI Intolerance 2012    Rizatriptan Nausea Only 2012            The following portions of the patient's history were reviewed and updated as appropriate: allergies, current medications, past family history, past medical history, past social history, past surgical history and problem list.     Past Medical History:   Diagnosis Date    Acute sinusitis     24 may 2017 resolved    Breastfeeding (infant)     Dysfunction of eustachian tube     24 may 2017    " "unspecified laterality    Elevated blood pressure reading     2016  resolved    Gallbladder pain     \"sporadic\"    Gave birth to child recently     2016    IUD (intrauterine device) in place     2016  resolved    Migraine     Varicella        Past Surgical History:   Procedure Laterality Date     SECTION N/A 2016    Procedure:  SECTION ();  Surgeon: Ivan Bell MD;  Location: [facility];  Service:     CHOLECYSTECTOMY      laparoscopic    COLONOSCOPY      EAR SURGERY Bilateral     ear pressure equalization tube bilateral    GALLBLADDER SURGERY      INTRAUTERINE DEVICE INSERTION      MYRINGOTOMY W/ TUBES Left     WA  DELIVERY ONLY N/A 2020    Procedure:  SECTION () REPEAT;  Surgeon: Ivan Bell MD;  Location: [facility];  Service: Obstetrics    WA LAP,CHOLECYSTECTOMY/GRAPH N/A 11/3/2016    Procedure: LAPAROSCOPIC CHOLECYSTECTOMY WITH INTROPERATIVE CHOLANGIOGRAM; LYSIS OF ADHESIONS;  Surgeon: Amirah Kate MD;  Location: [facility];  Service: General    WISDOM TOOTH EXTRACTION         Family History   Problem Relation Age of Onset    Breast cancer Mother     Heart attack Father     Heart disease Father     Heart attack Paternal Grandfather     Heart disease Paternal Grandfather     Breast cancer Maternal Aunt          Medications have been verified.        Objective   /80   Pulse 73   Temp 97.6 °F (36.4 °C)   Resp 16   Ht 5' 5\" (1.651 m)   Wt 112 kg (246 lb)   SpO2 97%   BMI 40.94 kg/m²        Physical Exam     Physical Exam  Vitals and nursing note reviewed.   Constitutional:       Appearance: She is well-developed and normal weight.   HENT:      Head: Normocephalic and atraumatic.      Right Ear: Tympanic membrane and ear canal normal.      Left Ear: Tympanic membrane and ear canal normal.      Mouth/Throat:      Mouth: Mucous membranes are moist.      Pharynx: Posterior oropharyngeal erythema present.      Tonsils: No " tonsillar exudate.     Eyes:      Conjunctiva/sclera: Conjunctivae normal.       Cardiovascular:      Rate and Rhythm: Normal rate and regular rhythm.      Heart sounds: Normal heart sounds.   Pulmonary:      Effort: Pulmonary effort is normal.      Breath sounds: Normal breath sounds.   Abdominal:      Palpations: Abdomen is soft.     Musculoskeletal:      Cervical back: Normal range of motion and neck supple.     Skin:     General: Skin is warm.      Capillary Refill: Capillary refill takes less than 2 seconds.     Neurological:      Mental Status: She is alert and oriented to person, place, and time.                          [1]   Current Outpatient Medications:     acetaminophen-codeine (TYLENOL #3) 300-30 mg per tablet, Take 1 tablet by mouth every 8 (eight) hours as needed for moderate pain (for migraines), Disp: 30 tablet, Rfl: 0    budesonide (RINOCORT AQUA) 32 MCG/ACT nasal spray, 1 spray into each nostril daily, Disp: 5 mL, Rfl: 5    clindamycin (CLEOCIN) 300 MG capsule, Take 1 capsule (300 mg total) by mouth 4 (four) times a day for 10 days, Disp: 40 capsule, Rfl: 0    Multiple Vitamins-Minerals (ONE-A-DAY WOMENS PO), Take by mouth, Disp: , Rfl:

## 2025-05-20 LAB — BACTERIA THROAT CULT: NORMAL

## (undated) DEVICE — TELFA NON-ADHERENT ABSORBENT DRESSING: Brand: TELFA

## (undated) DEVICE — PACK C-SECTION PBDS

## (undated) DEVICE — GLOVE INDICATOR PI UNDERGLOVE SZ 8 BLUE

## (undated) DEVICE — 3M™ STERI-STRIP™ REINFORCED ADHESIVE SKIN CLOSURES, R1542, 1/4 IN X 1-1/2 IN (6 MM X 38 MM), 6 STRIPS/ENVELOPE: Brand: 3M™ STERI-STRIP™

## (undated) DEVICE — SUT VICRYL 0 CTX 36 IN J978H

## (undated) DEVICE — CHLORAPREP HI-LITE 26ML ORANGE

## (undated) DEVICE — ASTOUND STANDARD SURGICAL GOWN, XXL: Brand: CONVERTORS

## (undated) DEVICE — ABG MICROSTICKS SAFETY

## (undated) DEVICE — GLOVE SRG BIOGEL ECLIPSE 8

## (undated) DEVICE — SCD SEQUENTIAL COMPRESSION COMFORT SLEEVE MEDIUM KNEE LENGTH: Brand: KENDALL SCD